# Patient Record
Sex: FEMALE | Race: WHITE | NOT HISPANIC OR LATINO | Employment: OTHER | URBAN - METROPOLITAN AREA
[De-identification: names, ages, dates, MRNs, and addresses within clinical notes are randomized per-mention and may not be internally consistent; named-entity substitution may affect disease eponyms.]

---

## 2019-05-02 LAB
CREAT SERPL-MCNC: 0.8 MG/DL
GFR SERPL CREATININE-BSD FRML MDRD: >60 ML/MIN
HBA1C MFR BLD: 6.6 % (ref 0–5.7)
POTASSIUM SERPL-SCNC: 3.7 MEQ/L (ref 3.5–5.3)

## 2019-07-17 ENCOUNTER — TRANSFERRED RECORDS (OUTPATIENT)
Dept: HEALTH INFORMATION MANAGEMENT | Facility: CLINIC | Age: 64
End: 2019-07-17

## 2019-07-19 ENCOUNTER — TRANSFERRED RECORDS (OUTPATIENT)
Dept: HEALTH INFORMATION MANAGEMENT | Facility: CLINIC | Age: 64
End: 2019-07-19

## 2019-07-19 LAB
RETINOPATHY: NORMAL
RETINOPATHY: NORMAL

## 2019-08-05 LAB — HEMOCCULT STL QL IA: NEGATIVE

## 2019-10-25 LAB
ALT SERPL-CCNC: 20 U/L (ref 0–41)
CHOLEST SERPL-MCNC: 120 MG/DL
HBA1C MFR BLD: 6.4 % (ref 0–5.7)
HDLC SERPL-MCNC: 50 MG/DL
LDLC SERPL CALC-MCNC: 56 MG/DL
TRIGL SERPL-MCNC: 72 MG/DL

## 2020-06-18 LAB
CREAT SERPL-MCNC: 0.7 MG/DL
GFR SERPL CREATININE-BSD FRML MDRD: >60 ML/MIN
HBA1C MFR BLD: 6.4 % (ref 0–5.7)
POTASSIUM SERPL-SCNC: 4 MEQ/L (ref 3.5–5.3)

## 2020-09-12 ENCOUNTER — TRANSFERRED RECORDS (OUTPATIENT)
Dept: HEALTH INFORMATION MANAGEMENT | Facility: CLINIC | Age: 65
End: 2020-09-12

## 2020-09-17 ENCOUNTER — TRANSFERRED RECORDS (OUTPATIENT)
Dept: HEALTH INFORMATION MANAGEMENT | Facility: CLINIC | Age: 65
End: 2020-09-17

## 2020-09-17 LAB — HEMOCCULT STL QL IA: NEGATIVE

## 2020-10-16 ENCOUNTER — OFFICE VISIT (OUTPATIENT)
Dept: FAMILY MEDICINE | Facility: CLINIC | Age: 65
End: 2020-10-16
Payer: COMMERCIAL

## 2020-10-16 VITALS
OXYGEN SATURATION: 100 % | HEIGHT: 64 IN | WEIGHT: 202 LBS | BODY MASS INDEX: 34.49 KG/M2 | HEART RATE: 76 BPM | TEMPERATURE: 97.5 F | SYSTOLIC BLOOD PRESSURE: 138 MMHG | DIASTOLIC BLOOD PRESSURE: 69 MMHG

## 2020-10-16 DIAGNOSIS — H00.14 CHALAZION LEFT UPPER EYELID: ICD-10-CM

## 2020-10-16 DIAGNOSIS — L60.8 NAIL DEFORMITY: ICD-10-CM

## 2020-10-16 DIAGNOSIS — I10 HYPERTENSION GOAL BP (BLOOD PRESSURE) < 130/80: ICD-10-CM

## 2020-10-16 DIAGNOSIS — E11.9 TYPE 2 DIABETES MELLITUS WITHOUT COMPLICATION, WITHOUT LONG-TERM CURRENT USE OF INSULIN (H): ICD-10-CM

## 2020-10-16 DIAGNOSIS — Z76.89 ENCOUNTER TO ESTABLISH CARE: Primary | ICD-10-CM

## 2020-10-16 DIAGNOSIS — E78.5 HYPERLIPIDEMIA LDL GOAL <100: ICD-10-CM

## 2020-10-16 DIAGNOSIS — Z23 NEED FOR PROPHYLACTIC VACCINATION AND INOCULATION AGAINST INFLUENZA: ICD-10-CM

## 2020-10-16 PROCEDURE — 90662 IIV NO PRSV INCREASED AG IM: CPT | Performed by: NURSE PRACTITIONER

## 2020-10-16 PROCEDURE — 99203 OFFICE O/P NEW LOW 30 MIN: CPT | Performed by: NURSE PRACTITIONER

## 2020-10-16 PROCEDURE — G0008 ADMIN INFLUENZA VIRUS VAC: HCPCS | Performed by: NURSE PRACTITIONER

## 2020-10-16 RX ORDER — ATORVASTATIN CALCIUM 40 MG/1
20 TABLET, FILM COATED ORAL DAILY
COMMUNITY
End: 2020-10-16

## 2020-10-16 RX ORDER — CHOLECALCIFEROL (VITAMIN D3) 50 MCG
1 TABLET ORAL DAILY
COMMUNITY

## 2020-10-16 RX ORDER — LISINOPRIL 20 MG/1
40 TABLET ORAL DAILY
COMMUNITY
End: 2020-10-16 | Stop reason: DRUGHIGH

## 2020-10-16 RX ORDER — METFORMIN HCL 500 MG
2000 TABLET, EXTENDED RELEASE 24 HR ORAL
Qty: 360 TABLET | Refills: 0 | Status: SHIPPED | OUTPATIENT
Start: 2020-10-16 | End: 2020-12-21

## 2020-10-16 RX ORDER — ATORVASTATIN CALCIUM 40 MG/1
40 TABLET, FILM COATED ORAL DAILY
Qty: 90 TABLET | Refills: 0 | Status: SHIPPED | OUTPATIENT
Start: 2020-10-16 | End: 2020-12-21

## 2020-10-16 RX ORDER — LISINOPRIL 20 MG/1
40 TABLET ORAL DAILY
Status: CANCELLED | OUTPATIENT
Start: 2020-10-16

## 2020-10-16 RX ORDER — MULTIVIT WITH MINERALS/LUTEIN
1000 TABLET ORAL DAILY
COMMUNITY

## 2020-10-16 RX ORDER — ATORVASTATIN CALCIUM 40 MG/1
40 TABLET, FILM COATED ORAL DAILY
Qty: 90 TABLET | Refills: 0 | Status: SHIPPED | OUTPATIENT
Start: 2020-10-16 | End: 2020-10-16

## 2020-10-16 RX ORDER — LISINOPRIL AND HYDROCHLOROTHIAZIDE 12.5; 2 MG/1; MG/1
2 TABLET ORAL DAILY
Qty: 180 TABLET | Refills: 0 | Status: SHIPPED | OUTPATIENT
Start: 2020-10-16 | End: 2020-12-21

## 2020-10-16 RX ORDER — METFORMIN HCL 500 MG
500 TABLET, EXTENDED RELEASE 24 HR ORAL
COMMUNITY
End: 2020-10-16

## 2020-10-16 SDOH — HEALTH STABILITY: MENTAL HEALTH: HOW OFTEN DO YOU HAVE 6 OR MORE DRINKS ON ONE OCCASION?: NOT ASKED

## 2020-10-16 SDOH — HEALTH STABILITY: MENTAL HEALTH: HOW MANY STANDARD DRINKS CONTAINING ALCOHOL DO YOU HAVE ON A TYPICAL DAY?: NOT ASKED

## 2020-10-16 SDOH — HEALTH STABILITY: MENTAL HEALTH: HOW OFTEN DO YOU HAVE A DRINK CONTAINING ALCOHOL?: NOT ASKED

## 2020-10-16 ASSESSMENT — MIFFLIN-ST. JEOR: SCORE: 1446.27

## 2020-10-16 NOTE — PROGRESS NOTES
Subjective   Janelle Bhat is a 64 year old female who presents to clinic today for the following health issues:    HPI   Diabetes Follow-up    How often are you checking your blood sugar? One time daily  What time of day are you checking your blood sugars (select all that apply)?  different times   Have you had any blood sugars above 200?  No  Have you had any blood sugars below 70?  No    What symptoms do you notice when your blood sugar is low?  None - last A1c - 6.4 earlier this year    What concerns do you have today about your diabetes? None     Do you have any of these symptoms? (Select all that apply)  No numbness or tingling in feet.  No redness, sores or blisters on feet.  No complaints of excessive thirst.  No reports of blurry vision.  No significant changes to weight.  Last eye exam - just over 1 year ago    New here from [place].  Meds reviewed with patient from her prescription bottles. She is unsure but believes she has not had physical exam or Mammo this year. Last labs were good per her report.   Chronic conditions of HTN, Hyperlipidemia and Type 2 diabetes all stable on her medications without concern or medication side effects.   Due for eye exam; she does not think last eye exam checked for retinopathy.   Concerns of a stye on upper left eyelid that is now hard and painless. Also has hard thick toe nails.       BP Readings from Last 2 Encounters:   10/16/20 138/69     No results found for: A1C, LDL    Hyperlipidemia Follow-Up      Are you regularly taking any medication or supplement to lower your cholesterol?   Yes- Atorvastatin    Are you having muscle aches or other side effects that you think could be caused by your cholesterol lowering medication?  No    Hypertension Follow-up      Do you check your blood pressure regularly outside of the clinic? No     Are you following a low salt diet? Yes    Are your blood pressures ever more than 140 on the top number (systolic) OR more   than  "90 on the bottom number (diastolic), for example 140/90? Unsure      How many servings of fruits and vegetables do you eat daily?  3 at least    On average, how many sweetened beverages do you drink each day (Examples: soda, juice, sweet tea, etc.  Do NOT count diet or artificially sweetened beverages)?   0    How many days per week do you exercise enough to make your heart beat faster? 0 - does walk - unsure if heart rate is fasting    How many minutes a day do you exercise enough to make your heart beat faster? 0    How many days per week do you miss taking your medication? 0      Reviewed and updated as needed this visit by provider:  Tobacco  Allergies  Meds  Problems  Med Hx  Surg Hx  Fam Hx          Review of Systems   Constitutional, HEENT, cardiovascular, pulmonary, GI, , musculoskeletal, neuro, skin, endocrine and psych systems are negative, except as otherwise noted in the HPI.          Objective   /69 (BP Location: Right arm, Patient Position: Chair, Cuff Size: Adult Large)   Pulse 76   Temp 97.5  F (36.4  C) (Tympanic)   Ht 1.626 m (5' 4\")   Wt 91.6 kg (202 lb)   SpO2 100%   Breastfeeding No   BMI 34.67 kg/m   Body mass index is 34.67 kg/m .  Physical Exam   GENERAL: healthy, alert, well nourished, well hydrated, no distress  EYES: Eyes grossly normal to inspection, extraocular movements - intact, and PERRL; left upper inner eyelid with chalazion painless  RESP: lungs clear to auscultation - no rales, no rhonchi, no wheezes  CV: regular rates and rhythm, normal S1 S2, no S3 or S4 and no murmur, no click or rub -  SKIN: no suspicious lesions, no rashes  Nail exam: onychomycosis of the toenails; very thick, hard yellowed toenails; left great toe nail growing sideways        Assessment & Plan   Janelle Dickinson was seen today for new patient, establish care and recheck medication.    Diagnoses and all orders for this visit:    Encounter to establish care  CIRILO or records from [place].   Need " to return for wellness exam within the next few months.     Hyperlipidemia LDL goal <100  No concerns.  Stable.  Continue same medication this was refilled today.  Labs due she will return for fasting physical with labs.   -     atorvastatin (LIPITOR) 40 MG tablet; Take 1 tablet (40 mg) by mouth daily    Hypertension goal BP (blood pressure) < 130/80  No concerns.  Stable.  Continue same medication this was refilled today.  Labs due she will return for fasting physical with labs.   -     lisinopril-hydrochlorothiazide (ZESTORETIC) 20-12.5 MG tablet; Take 2 tablets by mouth daily    Type 2 diabetes mellitus without complication, without long-term current use of insulin (H)  No concerns.  Stable.  Continue same medication this was refilled today.  Labs due she will return for fasting physical with labs.   -     metFORMIN (GLUCOPHAGE-XR) 500 MG 24 hr tablet; Take 4 tablets (2,000 mg) by mouth daily (with dinner) 4 tablets daily with dinner  -     EYE ADULT REFERRAL; Future  -     Orthopedic & Spine  Referral; Future    Chalazion left upper eyelid  -     EYE ADULT REFERRAL; Future    Nail deformity  -     Orthopedic & Spine  Referral; Future    See Patient Instructions    Return in about 4 weeks (around 11/13/2020) for Wellness exam fasting labs.     Georgina Caputo, ROSS-[address] [address]  [email or web address]   Office: [phone]

## 2020-10-22 ENCOUNTER — OFFICE VISIT (OUTPATIENT)
Dept: OPTOMETRY | Facility: CLINIC | Age: 65
End: 2020-10-22
Attending: NURSE PRACTITIONER
Payer: COMMERCIAL

## 2020-10-22 DIAGNOSIS — H01.003 BLEPHARITIS OF BOTH EYES, UNSPECIFIED EYELID, UNSPECIFIED TYPE: Primary | ICD-10-CM

## 2020-10-22 DIAGNOSIS — H00.014 HORDEOLUM EXTERNUM OF LEFT UPPER EYELID: ICD-10-CM

## 2020-10-22 DIAGNOSIS — H01.006 BLEPHARITIS OF BOTH EYES, UNSPECIFIED EYELID, UNSPECIFIED TYPE: Primary | ICD-10-CM

## 2020-10-22 DIAGNOSIS — E11.9 TYPE 2 DIABETES MELLITUS WITHOUT COMPLICATION, WITHOUT LONG-TERM CURRENT USE OF INSULIN (H): ICD-10-CM

## 2020-10-22 PROCEDURE — 99203 OFFICE O/P NEW LOW 30 MIN: CPT | Performed by: OPTOMETRIST

## 2020-10-22 RX ORDER — ERYTHROMYCIN 5 MG/G
0.5 OINTMENT OPHTHALMIC 2 TIMES DAILY
Qty: 1 TUBE | Refills: 1 | Status: SHIPPED | OUTPATIENT
Start: 2020-10-22 | End: 2020-10-29

## 2020-10-22 RX ORDER — DOXYCYCLINE HYCLATE 100 MG
100 TABLET ORAL 2 TIMES DAILY
Qty: 20 TABLET | Refills: 0 | Status: CANCELLED | OUTPATIENT
Start: 2020-10-22 | End: 2020-11-01

## 2020-10-22 ASSESSMENT — VISUAL ACUITY
OS_CC: 20/20
METHOD: SNELLEN - LINEAR
CORRECTION_TYPE: GLASSES

## 2020-10-22 ASSESSMENT — EXTERNAL EXAM - RIGHT EYE: OD_EXAM: NORMAL

## 2020-10-22 NOTE — PATIENT INSTRUCTIONS
"Recorded as Task  Date: 01/05/2018 07:44 AM, Created By: Trip Knight  Task Name: 3. Referral Request  Assigned To: [facility] Nurse Team  Regarding Patient: Jerome Carballo, Status: Active  CommentTrip Knight - 05 Jan 2018 7:44 AM    Referral Request  Referral Request: sleep apnea test    IS THIS A NEW REQUEST?: yes      REFERRAL ORDERED BY: wife           INSURANCE TYPE: PPO BLUE CROSS      REASON FOR REFERRAL: referred by Dr Nikole Brown    Preferred Delivery Method:           Iva Jaya  PICKUP:  [phone]  Georgette Donate:  (NO or YES- confirm address correct  in IDX) no      FAX NUMBER (if applicable):  no     CALLER'S RELATIONSHIP TO PATIENT:    DARIEL OSBORNE         IF OTHER, NAME AND RELATIONSHIP:    wife    BEST NUMBER TO BE CONTACTED:  [phone]        ALTERNATIVE PHONE NUMBER:  [phone] patient's cell    Turnaround time given to caller:    ""THIS MESSAGE WILL BE SENT TO  ___ (state provider's first and last name) ____, THE CLINICAL TEAM WILL RETURN YOUR CALL AS SOON AS THEY HAVE REVIEWED YOUR MESSAGE\"". TYPICALLY IT TAKES 3 BUSINESS DAYS TO PROCESS REFERRAL REQUESTS. \""      READ BACK MESSAGE TO PATIENT  Angy Ayala (JONNIEMALAIKA)Chhaya - 05 Jan 1545 11:10 AM    TASK EDITED  Pls task pt states DR kan told them to ask you about sleep apnea testing - prior to surgery because of sleep apnea - ok to  [facility] - 05 Jan 2018 11:14 AM    TASK REASSIGNED: Previously Assigned To [facility]  ok to refer to Dr Leanne Pacheco for a sleep apnea evalution, order in [place]      Electronically signed Saul Goldsmith M.D.   Jan 5 2018 11:14AM CST    " Warm compresses four times daily for 10 minutes until resolved      Add topical ointment two times daily for a week      Cause is from underlying blepharitis  Blepharitis is a chronic or long term inflammation of the eyelids and eyelashes. It affects all ages. Causes include poor eyelid hygiene, excess oil production, staph bacteria or an allergic reaction.    Blepharitis may appear as greasy flakes on the base of the eyelashes, crusting of eyelashes and mild redness of the eyelid margins.  Sometimes it may result in an acute infection of a gland in the eyelid called a stye and sometimes painless firm nodules can form in the eyelid that do not resolve on their own and must be surgically removed.    Treatment includes warm compresses and lid hygiene with an antimicrobial lid scrub and sometimes a prescription ointment is needed.      Hot compresses/ warm soaks  Warm compresses are very beneficial to the normal functioning of the eye.   They help loosen up the eyelid debris that has collected on the eyelash follicles.  Overabundance of bacterial microorganisms along the eyelashes and lid margins induce stress on the tear film and promote inflammation.  Regular lid hygiene helps diminish the bacterial population to prevent inflammation and infection.  Cleanse lids once daily with a lid cleansing product as directed such as Ocusoft or Sterilid which can be purchased at most pharmacies. Eyelid cleansers maintain clean and healthy eyelid margins. Ocusoft or sterilid are commercial products that are available as individual wrapped cleansing pads.  Diluted baby shampoo will work, but not as well and is a cheaper alternative.    Directions for warm soaks  There are few methods for hot compresses. Moisten a washcloth with hot water, or microwave for 10 seconds, being careful to not get the cloth too hot.   Then put the washcloth onto your eyelids for 5 minutes. It will cool quickly so a rice pack or eyemask that can be heated  and laid on top of the washcloth will help retain the heat.      if there is more swelling / worsens call or return to clinic

## 2020-10-22 NOTE — LETTER
"    10/22/2020         RE: Janelle Bhat  [address] [address]        Dear Colleague,    Thank you for referring your patient, Janelle Bhat, to the [facility]. Please see a copy of my visit note below.    Chief Complaint   Patient presents with     Eye Problem Left Eye     HPI    Eye Problem Left Eye      In left eye. Onset was gradual. This started 6 weeks ago. Severity is moderate. Occurring constantly. It is worse throughout the day. Since onset it is gradually improving. Associated symptoms include eye pain and swelling.   Comments      09/2020: She was referred to optometry because of a swelling on her left upper lid. \"like a pimple\". Was told by PCP that is was going to take some time. Had antibiotic drops- was on for 10 days. She has had no problems afterward. She reports now there is a developing hard lump with a white core that hurts to the touch. She was diagnosed with a chalazion- has been doing phone visits with her doctor in [place].     Janelle Dickinson does not want to do her full exam today, she may have done this already this year in [place], just moved here and has to check her medical records and then will schedule   Do you wear contact lenses? No        Laya Aiken CPO    See Review Of Systems       Medical, surgical and family histories reviewed and updated 10/22/2020.         OBJECTIVE: See Ophthalmology exam    ASSESSMENT:    ICD-10-CM    1. Type 2 diabetes mellitus without complication, without long-term current use of insulin (H)  E11.9 EYE ADULT REFERRAL   2. Hordeolum externum of left upper eyelid  H00.014 EYE ADULT REFERRAL     erythromycin (ROMYCIN) 5 MG/GM ophthalmic ointment    No I&C required as it is draining  no oral antibiotic indicated at this time    PLAN:  Discussed lid hygiene / proper warm compresses   Continue warm compresses qid add ointment two times daily for one week if worsens call       Tonie Matthews OD       Again, thank you " for allowing me to participate in the care of your patient.        Sincerely,        Tonie Matthews OD

## 2020-10-22 NOTE — PROGRESS NOTES
"Chief Complaint   Patient presents with     Eye Problem Left Eye     HPI    Eye Problem Left Eye      In left eye. Onset was gradual. This started 6 weeks ago. Severity is moderate. Occurring constantly. It is worse throughout the day. Since onset it is gradually improving. Associated symptoms include eye pain and swelling.   Comments      09/2020: She was referred to optometry because of a swelling on her left upper lid. \"like a pimple\". Was told by PCP that is was going to take some time. Had antibiotic drops- was on for 10 days. She has had no problems afterward. She reports now there is a developing hard lump with a white core that hurts to the touch. She was diagnosed with a chalazion- has been doing phone visits with her doctor in [place].     Janelle Dickinson does not want to do her full exam today, she may have done this already this year in [place], just moved here and has to check her medical records and then will schedule   Do you wear contact lenses? No        Laya Aiken CPO    See Review Of Systems       Medical, surgical and family histories reviewed and updated 10/22/2020.         OBJECTIVE: See Ophthalmology exam    ASSESSMENT:    ICD-10-CM    1. Type 2 diabetes mellitus without complication, without long-term current use of insulin (H)  E11.9 EYE ADULT REFERRAL   2. Hordeolum externum of left upper eyelid  H00.014 EYE ADULT REFERRAL     erythromycin (ROMYCIN) 5 MG/GM ophthalmic ointment    No I&C required as it is draining  no oral antibiotic indicated at this time    PLAN:  Discussed lid hygiene / proper warm compresses   Continue warm compresses qid add ointment two times daily for one week if worsens call       Tonie Matthews OD   "

## 2020-11-02 ENCOUNTER — OFFICE VISIT (OUTPATIENT)
Dept: PODIATRY | Facility: CLINIC | Age: 65
End: 2020-11-02
Payer: COMMERCIAL

## 2020-11-02 VITALS
WEIGHT: 203 LBS | SYSTOLIC BLOOD PRESSURE: 154 MMHG | DIASTOLIC BLOOD PRESSURE: 80 MMHG | HEIGHT: 64 IN | BODY MASS INDEX: 34.66 KG/M2

## 2020-11-02 DIAGNOSIS — L60.8 NAIL DEFORMITY: ICD-10-CM

## 2020-11-02 DIAGNOSIS — E11.9 TYPE 2 DIABETES MELLITUS WITHOUT COMPLICATION, WITHOUT LONG-TERM CURRENT USE OF INSULIN (H): ICD-10-CM

## 2020-11-02 DIAGNOSIS — L60.8 CHANGE IN NAIL APPEARANCE: Primary | ICD-10-CM

## 2020-11-02 PROCEDURE — 99203 OFFICE O/P NEW LOW 30 MIN: CPT | Performed by: PODIATRIST

## 2020-11-02 ASSESSMENT — MIFFLIN-ST. JEOR: SCORE: 1445.8

## 2020-11-02 NOTE — PROGRESS NOTES
ASSESSMENT/PLAN:  Encounter Diagnoses   Name Primary?     Type 2 diabetes mellitus without complication, without long-term current use of insulin (H)      Nail deformity      Change in nail appearance Yes     I explained that toenail changes are often from injury to a nail unit, rather than from fungus.  Injury might be a one-time event or from repetitive irritation in foot wear and with certain types of activities.  Injured nails are likely more susceptible to fungal infection.  Change seen in multiple nails is more likely from fungus.  Treatment options are limited.     It was explained that many people opt to simply keep the involved nails trimmed and filed. [facility] does not offer this service.  Another option is a trial of a topical and/or oral antifungal.  Many times these are not successful nor provide a cure.  These medications do not correct a nail deformity.      Permanent removal of deformed toenails is an option.      The patient opted live with the nails the way they are, file them down and possibly seek out a foot care nurse business to assist.     Otherwise healthy feet.    Bryson Ray, CHANNING, FACFAS, MS    [facility]      ____________________________________________________________________    HPI:       I was asked by Georgina Caputo, YESSI FINN to evaluate this patient, in consultation, regarding type 2 DM and a nail deformity of her left great toe.     Chief Complaint: two toenails with issue, left foot (hallux and 3rd toe)  Onset of problem: before 2013  She is concerned about nail fungus and inquires about treatment options.  No pain.  Type 2 DM.  Denies numbness in her feet    *There is no problem list on file for this patient.  *  *No past surgical history on file.*  *  Current Outpatient Medications   Medication Sig Dispense Refill     atorvastatin (LIPITOR) 40 MG tablet Take 1 tablet (40 mg) by mouth daily 90 tablet 0      lisinopril-hydrochlorothiazide (ZESTORETIC) 20-12.5 MG tablet Take 2 tablets by mouth daily 180 tablet 0     metFORMIN (GLUCOPHAGE-XR) 500 MG 24 hr tablet Take 4 tablets (2,000 mg) by mouth daily (with dinner) 4 tablets daily with dinner 360 tablet 0     vitamin C (ASCORBIC ACID) 1000 MG TABS Take 1,000 mg by mouth daily       vitamin D3 (CHOLECALCIFEROL) 50 mcg (2000 units) tablet Take 1 tablet by mouth daily         ROS:     A 10-point review of systems was performed and is positive for that noted above in the HPI and as seen below.  All other areas are negative.     Numbness in feet?  no   Calf pain with walking? rarely  Recent foot/ankle injury? no  Weight change over past 12 months? 18# loss  Self perception as overweight? yes  Recent flu-like symptoms? no  Joint pain other than feet ? Knees, hip, back    Social History: Employment:  retired;  Exercise/Physical activity:  Walking 2-3x/ week;  Tobacco use:  no  Social History     Socioeconomic History     Marital status: Single     Spouse name: Not on file     Number of children: Not on file     Years of education: Not on file     Highest education level: Not on file   Occupational History     Not on file   Social Needs     Financial resource strain: Not on file     Food insecurity     Worry: Not on file     Inability: Not on file     Transportation needs     Medical: Not on file     Non-medical: Not on file   Tobacco Use     Smoking status: Never Smoker     Smokeless tobacco: Never Used   Substance and Sexual Activity     Alcohol use: Yes     Comment: 0-2 Q1M     Drug use: Never     Sexual activity: Not Currently     Partners: Male   Lifestyle     Physical activity     Days per week: Not on file     Minutes per session: Not on file     Stress: Not on file   Relationships     Social connections     Talks on phone: Not on file     Gets together: Not on file     Attends Hindu service: Not on file     Active member of club or organization: Not on file      "Attends meetings of clubs or organizations: Not on file     Relationship status: Not on file     Intimate partner violence     Fear of current or ex partner: Not on file     Emotionally abused: Not on file     Physically abused: Not on file     Forced sexual activity: Not on file   Other Topics Concern     Not on file   Social History Narrative     Not on file       Family history:  Family History   Problem Relation Age of Onset     Diabetes Mother      Macular Degeneration Mother      Hypertension Sister        Rheumatoid arthritis:  no  Foot Problems: sibling with plantar faciitis  Diabetes: parent      EXAM:    Vitals: BP (!) 158/90   Ht 1.626 m (5' 4\")   Wt 92.1 kg (203 lb)   BMI 34.84 kg/m    BMI: Body mass index is 34.84 kg/m .  Height: 5' 4\"    Constitutional/ general:  Pt is in no apparent distress, appears well-nourished.  Cooperative with history and physical exam.     Diabetic foot exam: normal DP and PT pulses, no trophic changes or ulcerative lesions and normal sensory exam    Vascular:  Pedal pulses are palpable bilaterally for both the DP and PT arteries.  CFT < 3 sec.  No edema.  Pedal hair growth noted.     Neuro:  Alert and oriented x 3. Coordinated gait.  Light touch sensation is intact to the L4, L5, S1 distributions. No obvious deficits.  No evidence of neurological-based weakness, spasticity, or contracture in the lower extremities.     Derm: Normal texture and turgor.  No erythema, ecchymosis, or cyanosis.  No open lesions.     The left hallux and 3rd toenails are thickened and discolored. The hallux nail grows slightly lateral, without injury to skin on the 2nd toe.    Musculoskeletal:    Lower extremity muscle strength is normal.  Patient is ambulatory without an assistive device or brace .  No gross deformities.      "

## 2020-11-02 NOTE — LETTER
11/2/2020         RE: Janelle Bhat  [address] [facility] [address]        Dear Colleague,    Thank you for referring your patient, Janelle Bhat, to the [facility]. Please see a copy of my visit note below.      ASSESSMENT/PLAN:  Encounter Diagnoses   Name Primary?     Type 2 diabetes mellitus without complication, without long-term current use of insulin (H)      Nail deformity      Change in nail appearance Yes     I explained that toenail changes are often from injury to a nail unit, rather than from fungus.  Injury might be a one-time event or from repetitive irritation in foot wear and with certain types of activities.  Injured nails are likely more susceptible to fungal infection.  Change seen in multiple nails is more likely from fungus.  Treatment options are limited.     It was explained that many people opt to simply keep the involved nails trimmed and filed. [facility] does not offer this service.  Another option is a trial of a topical and/or oral antifungal.  Many times these are not successful nor provide a cure.  These medications do not correct a nail deformity.      Permanent removal of deformed toenails is an option.      The patient opted live with the nails the way they are, file them down and possibly seek out a foot care nurse business to assist.     Otherwise healthy feet.    Bryson Ray DPM, FACFAS, MS    [facility]      ____________________________________________________________________    HPI:       I was asked by Georgina Caputo, APRN CNP to evaluate this patient, in consultation, regarding type 2 DM and a nail deformity of her left great toe.     Chief Complaint: two toenails with issue, left foot (hallux and 3rd toe)  Onset of problem: before 2013  She is concerned about nail fungus and inquires about treatment options.  No pain.  Type 2 DM.  Denies numbness in her  feet    *There is no problem list on file for this patient.  *  *No past surgical history on file.*  *  Current Outpatient Medications   Medication Sig Dispense Refill     atorvastatin (LIPITOR) 40 MG tablet Take 1 tablet (40 mg) by mouth daily 90 tablet 0     lisinopril-hydrochlorothiazide (ZESTORETIC) 20-12.5 MG tablet Take 2 tablets by mouth daily 180 tablet 0     metFORMIN (GLUCOPHAGE-XR) 500 MG 24 hr tablet Take 4 tablets (2,000 mg) by mouth daily (with dinner) 4 tablets daily with dinner 360 tablet 0     vitamin C (ASCORBIC ACID) 1000 MG TABS Take 1,000 mg by mouth daily       vitamin D3 (CHOLECALCIFEROL) 50 mcg (2000 units) tablet Take 1 tablet by mouth daily         ROS:     A 10-point review of systems was performed and is positive for that noted above in the HPI and as seen below.  All other areas are negative.     Numbness in feet?  no   Calf pain with walking? rarely  Recent foot/ankle injury? no  Weight change over past 12 months? 18# loss  Self perception as overweight? yes  Recent flu-like symptoms? no  Joint pain other than feet ? Knees, hip, back    Social History: Employment:  retired;  Exercise/Physical activity:  Walking 2-3x/ week;  Tobacco use:  no  Social History     Socioeconomic History     Marital status: Single     Spouse name: Not on file     Number of children: Not on file     Years of education: Not on file     Highest education level: Not on file   Occupational History     Not on file   Social Needs     Financial resource strain: Not on file     Food insecurity     Worry: Not on file     Inability: Not on file     Transportation needs     Medical: Not on file     Non-medical: Not on file   Tobacco Use     Smoking status: Never Smoker     Smokeless tobacco: Never Used   Substance and Sexual Activity     Alcohol use: Yes     Comment: 0-2 Q1M     Drug use: Never     Sexual activity: Not Currently     Partners: Male   Lifestyle     Physical activity     Days per week: Not on file      "Minutes per session: Not on file     Stress: Not on file   Relationships     Social connections     Talks on phone: Not on file     Gets together: Not on file     Attends Sabianism service: Not on file     Active member of club or organization: Not on file     Attends meetings of clubs or organizations: Not on file     Relationship status: Not on file     Intimate partner violence     Fear of current or ex partner: Not on file     Emotionally abused: Not on file     Physically abused: Not on file     Forced sexual activity: Not on file   Other Topics Concern     Not on file   Social History Narrative     Not on file       Family history:  Family History   Problem Relation Age of Onset     Diabetes Mother      Macular Degeneration Mother      Hypertension Sister        Rheumatoid arthritis:  no  Foot Problems: sibling with plantar faciitis  Diabetes: parent      EXAM:    Vitals: BP (!) 158/90   Ht 1.626 m (5' 4\")   Wt 92.1 kg (203 lb)   BMI 34.84 kg/m    BMI: Body mass index is 34.84 kg/m .  Height: 5' 4\"    Constitutional/ general:  Pt is in no apparent distress, appears well-nourished.  Cooperative with history and physical exam.     Diabetic foot exam: normal DP and PT pulses, no trophic changes or ulcerative lesions and normal sensory exam    Vascular:  Pedal pulses are palpable bilaterally for both the DP and PT arteries.  CFT < 3 sec.  No edema.  Pedal hair growth noted.     Neuro:  Alert and oriented x 3. Coordinated gait.  Light touch sensation is intact to the L4, L5, S1 distributions. No obvious deficits.  No evidence of neurological-based weakness, spasticity, or contracture in the lower extremities.     Derm: Normal texture and turgor.  No erythema, ecchymosis, or cyanosis.  No open lesions.     The left hallux and 3rd toenails are thickened and discolored. The hallux nail grows slightly lateral, without injury to skin on the 2nd toe.    Musculoskeletal:    Lower extremity muscle strength is normal.  " Patient is ambulatory without an assistive device or brace .  No gross deformities.          Again, thank you for allowing me to participate in the care of your patient.        Sincerely,        Bryson Ray DPM

## 2020-11-02 NOTE — PATIENT INSTRUCTIONS
Thank you for choosing [facility]!    DR. MELÉNDEZ[facility]S CLINIC LOCATIONS     [place] SCHEDULE SURGERY: [phone]   [address] APPOINTMENTS: [phone]   [place] [address] BILLING QUESTIONS: [phone]  [phone] RADIOLOGY: [phone]       [facility]    [address]    [place] [address] [phone]  [phone]      Follow up: as needed    Please read through the following handouts and if you have any questions, please feel free to call us or send a [employer] message!    ROUTINE FOOT CARE (NAIL TRIMMING / CALLUSES)    Go to [email or web address].org ([employer]es Association) and search for providers near you.  Otherwise, this is a list we have gathered of  recommended locations/providers in [place].    [facility]   [phone]   [facility]  [phone]  [email or web address]   [facility]  [phone]  [facility] 15 mile radius [facility]  [phone]  [facility].   Raina Jacob, DPM  [address] [place] [address] [phone] Balaji Conley, DPM  [address] [facility], [place] [address] [phone]   [facility]  [phone] [address] [place] [address]  [facility]  Dr. Carlos Carpenter  [phone]  [place], [place]   [facility]  [phone]  [place] & [place] Locations  (does not take BCBS) FYI:  *Some providers accept insurance while others are out of pocket. Please contact them for details*         NAIL FUNGUS / ONYCHOMYCOSIS   Nail fungus is not a hygiene problem and will likely not lead to significant medical problems. The nails may get thick causing pain and possibly local skin infection. Treatments include debridement (trimming), oral antifungals, topical antifungals and complete removal of the nail. Most fungal nails are not treated.     Topicals such as tea tree oil can be helpful for surface fungus and may, at best, limit progression. Over  the counter creams (such as Lamisil) can also be used however, their effectiveness is also quite low.  Topical treatment with Pen lac is expensive and often not covered by insurance. Pen lac has an approximate 8% success rate. Topical therapy recommendations is to apply twice a day for at least 3-4 months as it takes 9 months for new nail to grow out.     Experts suggest soaking your feet for 15 to 20 minutes in a mixture of 1 cup vinegar to 4 cups warm water. Be sure to rinse well and pat your feet dry when you're done. You can soak your feet like this daily. But if your skin becomes irritated, try soaking only two to three times a week. Vicks VapoRub, as with vinegar, there have been no controlled clinical trials to assess the effectiveness of Vicks VapoRub on nail fungus, but there have been numerous anecdotal reports that it works. There's no consensus on how often to apply this product, so check with your doctor before using it on your nails.      Oral therapies include Sporanox and Lamisil. Oral therapies are also expensive and not very effective. Side effects such as liver disease are the main concern. Return of fungus is common even if the treatment worked.      Other Tips:  - Penlac nail medication apply daily x 4 months; remove old polish first day of each week  - Antifungal cream/powder (Zeasorb) - apply daily to feet and shoes x 2 months  - Clean shoes with Lysol or in washing machine every few weeks  - Rotate shoe gear; give them 24 hours to dry out between days wearing them  - Clean pair of socks in morning, clean pair in afternoon if your feet sweat  - Shower shoes used in public showers/pools        DIABETES AND YOUR FEET  Diabetes can result in several problems in the feet including ulcers (open sores) and amputations. Two of the most important reasons why people develop foot problems when they have diabetes is : 1. Neuropathy (loss of feeling)  2. Vascular disease (loss or decrease of blood  "flow).    Neuropathy is a term used to describe a loss of nerve function.  Patients with diabetes are at risk of developing neuropathy if their sugars continue to run high and are above the normal value. One theory for neuropathy is that the \"extra\" sugar in the body enters the nerves and is broken down. These by-products build up in the nerve causing it to swell and impairing nerve function. Often times, this can be prevented by controlling your sugars, dieting and exercise.    When a person develops neuropathy, they usually begin to feel numbness or tingling in their feet and sometime in their legs.  Other symptoms may include painful burning or hot feet, tingling or feeling like insects or ants are crawling on your feet or legs.  If the diabetes is sever and the sugars run high for long periods of time, neuropathy can also occur in the hands.    Vascular disease  is a term used to describe a loss or decrease in circulation (blood flow). There is a problem in getting blood and oxygen to areas that need it. Similar to neuropathy, sugars can build up in the walls of the arteries (blood vessels) and cause them to become swollen, thickened and hardened. This decreases the amount of blood that can go to an area that needs it. Though this is common in the legs of diabetic patients, it can also affect other arteries (blood vessels) in the body such as in the heart and eyes.    In the legs, vascular disease usually results in cramping. Patients who develop leg cramps after walking the same distance every time (i.e. One block, half a mile, ect.) need to let their doctors know so that their circulation may be checked. Cramps causing severe pain in the feet and/or legs while sleeping and the cramps go away when you stand or hang your legs off the side of the bed, may also be a sign of poor blood circulation.  Occasional cramping in cold weather or on rare occasions with activity may not be due to poor circulation, but you " should inform your doctor.    PREVENTION OF THESE DISEASES  The key to prevention is good blood sugar control. Poor blood sugar control is a big reason many of these problems start. Physical activity (exercise) is a very good way to help decrease your blood sugars. Exercise can lower your blood sugar, blood pressure, and cholesterol. It also reduces your risk for heart disease and stroke, relieves stress, and strengthens your heart, muscles and bones.  In addition, regular activity helps insulin work better, improves your blood circulation, and keeps your joints flexible. If you're trying to lose weight, a combination of exercise and wise food choices can help you reach your target weight and maintain it.      PAIN MANAGEMENT  1.Blood Sugar Control - Most important  2. Medications such as:  Amytriptylline, duloxetine, gabapentin, lyrica, tramadol  3. Nutritional therapy:  Vitamin B6 (100mg daily), Vitamin B12 (75mcg daily), Vitamin D 2000 IU daily), Alpha-Lipoic Acid (600-1800mg daily), Acetyl-L-Carnitine (500-1000mg TID, L-methyl folate (1500mcg daily)    ** Metformin can block Vitamin B6 and B12 so it is important to supplement**    FOOT CARE RECOMMENDATIONS   1. Wash your feet with lukewarm water and a mild soap and then dry them thoroughly, especially between the toes.     2. Examine your feet daily looking for cuts, corns, blisters, cracks, ect, especially after wearing new shoes. Make sure to look between your toes. If you cannot see the bottom of your feet, set a mirror on the floor and hold your foot over it, or ask a spouse, friend or family member to examine your feet for you. Contact your doctor immediately if new problems are noted or if sores are not healing.     3. Immediately apply moisturizer to the tops and bottoms of your feet, avoiding areas between the toes. Hand lotion (Intesive Care, Monique, Eucerin, Neutrogena, Curel, ect) is sufficient unless your doctor prescribes a medicated lotion. Apply  sunscreen to your feet when going swimming outside.     4. Use clean comfortable shoes, wear white socks (if you have any bleeding or drainage, you will see it on white socks). Socks should not have thick seams or cut off the circulation around the leg. Break in new shoes slowly and rotate with older shoes until broken in. Check the inside of your shoes with your hand to look for areas of irritation or objects that may have fallen into your shoes.       5. Keep slippers by the side of your bed for use during the night.     6.  Shoes should be fitted by a professional and should not cause areas of irritation.  Check your feet regularly when wearing a new pair of shoes and replace them as needed.     7.  Talk to your doctor about proper exercise. Exercise and stretching stimulate blood flow to your feet and maintain proper glucose levels.     8.  Monitor your blood glucose level as instructed by your doctor. Notify your doctor immediately if your blood sugar is abnormally high or low.    9. Cut your nails straight across, but then gently round any sharp edges with a cardboard nail file. If you have neuropathy, peripheral vascular disease or cannot see that well to trim your own toenails contact [facility] ([phone]) or [facility] ([phone]).      THINGS TO AVOID DOING   1.  Do not soak your feet if you have an open sore. Use only lukewarm water and always check the temperature with your hand as hot water can easily burn your feet.       2.  Never use a hot water bottle or heating pad on your feet. Also do not apply cold compresses to your feet. With decreased sensation, you could burn or freeze your feet.       3.  Do not apply any of these to your feet:    -  Over the counter medicine for corns or warts    -  Harsh chemicals like boric acid    -  Do not self-treat corns, cuts, blisters or infections. Always consult your doctor.       4.  Do not wear sandals, slippers or walk barefoot, especially on hot sand  or concrete or other harsh surfaces.     5.  If you smoke, stop!!!        Janelle Dickinson to follow up with Primary Care provider regarding elevated blood pressure. (if equal or greater than 140/90)

## 2020-11-29 ENCOUNTER — HOSPITAL ENCOUNTER (EMERGENCY)
Facility: CLINIC | Age: 65
Discharge: HOME | End: 2020-11-29
Attending: EMERGENCY MEDICINE | Admitting: EMERGENCY MEDICINE
Payer: COMMERCIAL

## 2020-11-29 VITALS
SYSTOLIC BLOOD PRESSURE: 179 MMHG | BODY MASS INDEX: 35 KG/M2 | RESPIRATION RATE: 20 BRPM | HEART RATE: 75 BPM | HEIGHT: 64 IN | TEMPERATURE: 97.8 F | DIASTOLIC BLOOD PRESSURE: 91 MMHG | WEIGHT: 205 LBS | OXYGEN SATURATION: 99 %

## 2020-11-29 DIAGNOSIS — R04.0 EPISTAXIS: ICD-10-CM

## 2020-11-29 PROCEDURE — 99283 EMERGENCY DEPT VISIT LOW MDM: CPT

## 2020-11-29 RX ORDER — OXYMETAZOLINE HYDROCHLORIDE 0.05 G/100ML
SPRAY NASAL
Status: DISCONTINUED
Start: 2020-11-29 | End: 2020-11-29 | Stop reason: HOSPADM

## 2020-11-29 ASSESSMENT — MIFFLIN-ST. JEOR: SCORE: 1454.87

## 2020-11-29 NOTE — ED AVS SNAPSHOT
[facility]  [address]  [facility] [address]  Phone: [phone]  Fax: [phone]                                    Janelle Bhat   MRN: [id number]    Department: [facility]   Date of Visit: 11/29/2020           After Visit Summary Signature Page    I have received my discharge instructions, and my questions have been answered. I have discussed any challenges I see with this plan with the nurse or doctor.    ..........................................................................................................................................  Patient/Patient Representative Signature      ..........................................................................................................................................  Patient Representative Print Name and Relationship to Patient    ..................................................               ................................................  Date                                   Time    ..........................................................................................................................................  Reviewed by Signature/Title    ...................................................              ..............................................  Date                                               Time          22EPIC Rev 08/18

## 2020-11-29 NOTE — ED PROVIDER NOTES
"  History     Chief Complaint:  Epistaxis    The patient was seen in room 27.  HPI   Janelle Bhat is a 65 year old female with a history of diabetes who presents with bilateral epistaxis. The patient reports she was performing her normal daily tasks this morning when she started to have right epistaxis, followed by bilateral epistaxis. Janelle Dickinson reports she held her nostrils for 1 hour without relief, prompting her visit to the Emergency Department. She denies trauma to the nose, taking blood thinners, and previous significant epistaxis. Janelle Dickinson believes she has a family history of nose bleeds.    Allergies:  No known drug allergies    Medications:    Lipitor  Zestoretic  Glucophage    Past Medical History:    Diabetes     Past Surgical History:    History reviewed. No pertinent surgical history.    Family History:    Diabetes (Mother)  Macular degeneration (Mother)  Hypertension (Sister)    Social History:  Smoking status: Never  Alcohol use: Yes  Drug use: Never  PCP: Georgina Caputo  Marital Status:  Single [1]    Review of Systems   HENT: Positive for nosebleeds.    All other systems reviewed and are negative.        Physical Exam     Patient Vitals for the past 24 hrs:   BP Temp Temp src Pulse Resp SpO2 Height Weight   11/29/20 1125 (!) 179/91 97.8  F (36.6  C) Temporal 75 20 99 % 1.626 m (5' 4\") 93 kg (205 lb)     Physical Exam  Constitutional: Alert  HENT:    Nose: Dried blood at the anterior aspect of the Kiesselbach's plexus, no active bleeding noted.   Mouth/Throat: Posterior oropharynx is clear, mucous membranes are moist  Eyes: EOM are normal. Pupils are equal, round, and reactive to light.   CV: Regular rate and rhythm, no murmurs, rubs or gallops.  Resp: Clear lungs to auscultation, all lung fields. Normal respiratory effort.   GI: Soft, non-distended. There is no tenderness. No rebound or guarding.   MSK: Normal range of motion. No deformity.   Neurological:   A/Ox3  5/5 strength is symmetric to " the upper and lower extremities;   Sensation intact to light touch throughout the upper and lower extremities;   Skin: Skin is warm and dry.    Emergency Department Course   Interventions:  AFRIN 0.05% spray    Emergency Department Course:  Past medical records, nursing notes, and vitals reviewed.  1133: I performed an exam of the patient and obtained history, as documented above.    1239: I rechecked the patient.  Findings and plan explained to the Patient. Patient discharged home with instructions regarding supportive care, medications, and reasons to return. The importance of close follow-up was reviewed.     Impression & Plan      Medical Decision Making:  This is a 65 year old female who comes in with epistaxis that started just prior to arrival. Exam as above. Bleeding stopped prior to my evaluation. No active bleeding on my exam. Dried blood at Kiesselbach's plexus. Afrin was instilled into both nares. Patient observed for 30 minutes and had no return of bleeding. Patient expressed understanding and was in agreement with the plan and discharge instructions which included reasons to return and follow-up.      Diagnosis:    ICD-10-CM    1. Epistaxis  R04.0        Disposition:  Discharged to home      Ck SWARTZ am serving as a scribe at 11:33 AM on 11/29/2020 to document services personally performed by Dalton Jaffe DO based on my observations and the provider's statements to me.        Dalton Jaffe DO  11/29/20 1848

## 2020-11-29 NOTE — ED TRIAGE NOTES
Patient has had a nose bleed for one hour.  No history of nose bleeds.      Denies blood thinners.      ABCs intact.  Alert and oriented x 3.

## 2020-12-04 ENCOUNTER — OFFICE VISIT (OUTPATIENT)
Dept: FAMILY MEDICINE | Facility: CLINIC | Age: 65
End: 2020-12-04
Payer: COMMERCIAL

## 2020-12-04 VITALS
HEART RATE: 74 BPM | DIASTOLIC BLOOD PRESSURE: 80 MMHG | TEMPERATURE: 98.8 F | OXYGEN SATURATION: 100 % | BODY MASS INDEX: 33.97 KG/M2 | HEIGHT: 64 IN | SYSTOLIC BLOOD PRESSURE: 140 MMHG | WEIGHT: 199 LBS

## 2020-12-04 DIAGNOSIS — R04.0 EPISTAXIS: ICD-10-CM

## 2020-12-04 DIAGNOSIS — I10 HYPERTENSION GOAL BP (BLOOD PRESSURE) < 130/80: Primary | ICD-10-CM

## 2020-12-04 PROCEDURE — 99214 OFFICE O/P EST MOD 30 MIN: CPT | Performed by: NURSE PRACTITIONER

## 2020-12-04 RX ORDER — AMLODIPINE BESYLATE 2.5 MG/1
2.5 TABLET ORAL DAILY
Qty: 90 TABLET | Refills: 0 | Status: SHIPPED | OUTPATIENT
Start: 2020-12-04 | End: 2020-12-21

## 2020-12-04 ASSESSMENT — MIFFLIN-ST. JEOR: SCORE: 1427.66

## 2020-12-04 NOTE — PATIENT INSTRUCTIONS
Patient Education     Nosebleed (Adult)    Bleeding from the nose most commonly occurs because of injury or drying and cracking of the inner lining of the nose. Most nosebleeds are because of dry air or nose-picking. They can occur during a common cold or an allergy attack. They can also occur on a very hot day, or from dry air in the winter.  If the bleeding site is found, it may be cauterized. This means it is treated to cause a blood clot to form. This may be done with a chemical, heat, or electricity. If the bleeding continues after the site is cauterized, or if the site cannot be found, packing may be put in your nose. This is to apply pressure and stop the bleeding. The packing may be made of gauze or sponge. A small balloon catheter is sometimes used. These must be removed by your healthcare provider. Some types of packing dissolve on their own. If you are taking blood thinning (anticoagulant) medicine, you may have a blood test.  Home care    If packing was put in your nose, unless told otherwise, do not pull on it or try to remove it yourself. You will be given an appointment to have it removed. You may also have been given antibiotics to prevent a sinus infection. If so, finish all of the medicine.    Don't blow your nose for 12 hours after the bleeding stops. This will allow a strong blood clot to form. Don't pick your nose. This may restart bleeding.    Don't drink alcohol or hot liquids for the next 2 days. Alcohol or hot liquids in your mouth can dilate blood vessels in your nose. This can cause bleeding to start again.    Don't take ibuprofen, naproxen, or medicines that contain aspirin. These thin the blood and may cause your nose to bleed. You may take acetaminophen for pain, unless another pain medicine was prescribed.    If the bleeding starts again, sit up and lean forward to prevent swallowing blood. Pinch your nose tightly on both sides, as shown above, for 10 to 15 minutes. Time yourself.  Don t release the pressure on your nose until 10 minutes is up. If bleeding does not stop, continue to pinch your nose and call your healthcare provider or return to this facility.    If you have a cold, allergies, or dry nasal membranes, lubricate the nasal passages. Apply a small amount of petroleum jelly inside the nose with a cotton swab twice a day (morning and night).    Don't overheat your home. This can dry the air and make your condition worse.    Put a humidifier in the room where you sleep. This will add moisture to the air. Clean the humidifier as advised by the .    Use a saline nasal spray to keep nasal passages moist.    Don't pick your nose. Keep fingernails trimmed to decrease risk of bleeds.    Don't smoke.    Follow-up care  Follow up with your healthcare provider, or as advised. Nasal packing should be rechecked or removed within 2 to 3 days.  When to seek medical advice  Call your healthcare provider right away if any of these occur.    You have another nosebleed that you cannot control    Dizziness, weakness, or fainting    You become tired or confused    Fever of 100.4 F (38 C) or higher, or as directed by your healthcare provider    Headache    Sinus or facial pain    Shortness of breath or trouble breathing  [employer] last reviewed this educational content on 11/1/2017 2000-2020 [employer]. [address] [place] [address], [place] [address]. All rights reserved. This information is not intended as a substitute for professional medical care. Always follow your healthcare professional's instructions.           Patient Education     Uncontrolled High Blood Pressure (Established)    Your blood pressure was unusually high today. Your blood pressure may be high for several reasons. For example, this can occur if you ve missed doses of your blood pressure medicine. Or it can happen if you are taking other medicines such as some asthma inhalers, decongestants, diet pills, and  illegal drugs like cocaine and amphetamine.   Other causes of high blood pressure include:     Weight gain    Too much salt in your diet    Smoking    Caffeine    Lack of exercise    Intense pain    Becoming upset. This means you feel fear, anger, or another strong emotion.  Blood pressure measurements are given as 2 numbers. Systolic blood pressure is the upper number. This is the pressure when the heart contracts. Diastolic blood pressure is the lower number. This is the pressure when the heart relaxes between beats. You will see your blood pressure readings written together. For example, a person with a systolic pressure of 118 and a diastolic pressure of 78 will have 118/78 written in the medical record. To be diagnosed with high blood pressure, your numbers must be higher than the normal range when tested over a period of time.   Blood pressure is categorized as normal, elevated, or stage 1 or stage 2 high blood pressure:     Normal blood pressure is systolic of less than 120 and diastolic of less than 80 (120/80)    Elevated blood pressure is systolic of 120 to 129 and diastolic less than 80    Stage 1 high blood pressure is systolic of 130 to 139 or diastolic between 80 to 89    Stage 2 high blood pressure is when systolic is 140 or higher or the diastolic is 90 or higher  Uncontrolled high blood pressure can cause serious health problems. It raises your risk for heart attack, stroke, and heart failure. But you can do many things to manage your blood pressure. In general, if you have high blood pressure, keeping your blood pressure below 130/80 mmHg may help prevent these problems. Your healthcare provider may prescribe medicine to help control blood pressure if lifestyle changes are not enough.   Home care  It s important to take steps to lower your blood pressure. If you are taking blood pressure medicine, the guidelines below may help you need less or no medicines in the future.     Start a weight-loss  program if you are overweight.    Cut back on the amount of salt in your diet:  ? Don't have high-salt foods such as olives, pickles, smoked meats, canned soups, deli meats, or salted potato chips.  ? Don t add salt to your food at the table.  ? Use only small amounts of salt when cooking.    Start an exercise program. Talk with your healthcare provider about what exercise program is best for you. It doesn t have to be difficult. Even brisk walking for 20 minutes 3 times a week is a good form of exercise.    Don't use medicines that stimulate the heart. This includes many over-the-counter cold and sinus decongestant pills and sprays, as well as diet pills. Check the warnings about high blood pressure on the label. Before purchasing any over-the-counter medicines or supplements, always ask the pharmacist about the product's potential interaction with your high blood pressure and your medicines.    Stimulants such as amphetamine or cocaine could be lethal for someone with high blood pressure. Never take these.    Limit how much caffeine you drink. Consider switching to noncaffeinated beverages.    Stop smoking. If you are a long-time smoker, this can be hard. Enroll in a stop-smoking program to make it more likely that you will succeed. Talk with your provider about ways to quit.    Learn how to handle stress better. This is an important part of any program to lower blood pressure. Learn ways to relax. These include meditation, yoga, and biofeedback.    If medicines were prescribed, take them exactly as directed. Missing doses may cause your blood pressure to get out of control. Don't stop taking your medicines, even if you feel better or you feel like you don't need them anymore. Talk with your healthcare provider.    If you miss a dose or doses of your medicines, check with your healthcare provider or pharmacist about what to do.    Consider buying an automatic blood pressure machine. Your provider may advise a  certain type. These are available at most pharmacies. It's ideal to measure your blood pressure twice a day, once in the morning, and once in the late afternoon. Try to be consistent. Check your blood pressure around the same time each day for a good comparison. Keep a written record of your home blood pressure readings and take the record to your medical appointments.  Here are some other guidelines on home blood pressure monitoring from the American [employer] Association.     Don't smoke or drink coffee for 30 minutes.    Go to the bathroom before the test.    Relax for 5 minutes before taking the measurement.    Sit correctly. Be sure your back is supported. Don't sit on a couch or soft chair. Uncross your feet and place them flat on the floor. Place your arm on a solid, flat surface like a table with the upper arm at heart level. Make certain the middle of the cuff is directly above the bend of the elbow. Check the monitor's instruction manual for an illustration.    Take multiple readings. When you measure, take 2 or 3 readings one minute apart and record all of the results.    Take your blood pressure at the same time every day, or as your healthcare provider recommends.    Record the date, time, and blood pressure reading.    Take the record with you to your next appointment. If your blood pressure monitor has a built-in memory, simply take the monitor with you to your next appointment.    Call your provider if you have several high readings. Don't be frightened by a single high reading, but if you get several high readings, check in with your healthcare provider.    Note: When blood pressure reaches a systolic (top number) of 180 or higher or a diastolic (bottom number) of 110 or higher, you need emergency medical treatment. Call your healthcare provider right away.  Follow-up care  Regular visits to your own healthcare provider for blood pressure and medicine checks are an important part of your care. Make a  follow-up appointment as directed. Bring the record of your home blood pressure readings to the appointment.   When to seek medical advice  Call your healthcare provider right away if any of these occur:    Blood pressure reaches a systolic (top number) of 180 or higher or diastolic (bottom number) of 110 or higher, emergency medical treatment is required.    Chest, arm, shoulder, neck, or upper back pain    Shortness of breath    Severe headache    Throbbing or rushing sound in the ears    Nosebleed that comes back or doesn't go away    Extreme drowsiness, confusion, or fainting    Dizziness or dizziness with spinning sensation (vertigo)    Weakness in an arm or leg or on one side of the face    Trouble speaking or seeing   [employer] last reviewed this educational content on 10/1/2019    4824-2348 [email or web address]. [address] [place] [address], [place] [address]. All rights reserved. This information is not intended as a substitute for professional medical care. Always follow your healthcare professional's instructions.

## 2020-12-04 NOTE — PROGRESS NOTES
"Subjective   Janelle Bhat is a 65 year old female who presents to clinic today for the following health issues:    HPI   ED/UC Followup:    Facility:  [facility]  Date of visit: 11/29/2020  Reason for visit: Expitaxis - Bilateral  Current Status: No longer having.      Saw podiatrist BP was elevated was also higher at ER.   Wondering if should be checking BP at home and what monitor to use    Reviewed and updated as needed this visit by provider:  Tobacco  Allergies  Meds  Problems  Med Hx  Surg Hx  Fam Hx          Review of Systems   Constitutional, HEENT, cardiovascular, pulmonary, GI, , musculoskeletal, neuro, skin, endocrine and psych systems are negative, except as otherwise noted in the HPI.          Objective   BP (!) 140/80 (BP Location: Left arm, Patient Position: Chair, Cuff Size: Adult Large)   Pulse 74   Temp 98.8  F (37.1  C) (Tympanic)   Ht 1.626 m (5' 4\")   Wt 90.3 kg (199 lb)   SpO2 100%   Breastfeeding No   BMI 34.16 kg/m   Body mass index is 34.16 kg/m .  Physical Exam   GENERAL: healthy, alert, well nourished, well hydrated, no distress  EYES: Eyes grossly normal to inspection, extraocular movements - intact, and PERRL  HENT: ear canals- normal; TMs- normal; Nose- normal; Mouth- no ulcers, no lesions  NECK: no tenderness, no adenopathy, no asymmetry, no masses, no stiffness; thyroid- normal to palpation  RESP: lungs clear to auscultation - no rales, no rhonchi, no wheezes  CV: regular rates and rhythm, normal S1 S2, no S3 or S4 and no murmur, no click or rub -  ABDOMEN: soft, no tenderness, no  hepatosplenomegaly, no masses, normal bowel sounds        Assessment & Plan   Janelle Dickinson was seen today for er f/u.    Diagnoses and all orders for this visit:    Hypertension goal BP (blood pressure) < 130/80  Not under good control.   Will add amlodipine.  Close follow up with BP and labs with wellness exam within 2 weeks.  Red flag symptoms discussed and if these occur present " "to the emergency room or call 911.  Janelle Dickinson verbalizes understanding of plan of care and is in agreement.    -     amLODIPine (NORVASC) 2.5 MG tablet; Take 1 tablet (2.5 mg) by mouth daily    Epistaxis  -     amLODIPine (NORVASC) 2.5 MG tablet; Take 1 tablet (2.5 mg) by mouth daily        BMI:   Estimated body mass index is 34.16 kg/m  as calculated from the following:    Height as of this encounter: 1.626 m (5' 4\").    Weight as of this encounter: 90.3 kg (199 lb).   Weight management plan: Discussed healthy diet and exercise guidelines    See Patient Instructions    Return in about 2 weeks (around 12/18/2020) for Wellness exam fasting labs.     ROSS Crocker-BC     [address] [address]  [email or web address]   Office: [phone]      "

## 2020-12-21 ENCOUNTER — OFFICE VISIT (OUTPATIENT)
Dept: FAMILY MEDICINE | Facility: CLINIC | Age: 65
End: 2020-12-21
Payer: COMMERCIAL

## 2020-12-21 VITALS
BODY MASS INDEX: 34.15 KG/M2 | WEIGHT: 200 LBS | SYSTOLIC BLOOD PRESSURE: 128 MMHG | DIASTOLIC BLOOD PRESSURE: 81 MMHG | HEART RATE: 77 BPM | TEMPERATURE: 97.3 F | HEIGHT: 64 IN | OXYGEN SATURATION: 100 %

## 2020-12-21 DIAGNOSIS — Z12.31 ENCOUNTER FOR SCREENING MAMMOGRAM FOR BREAST CANCER: ICD-10-CM

## 2020-12-21 DIAGNOSIS — E87.1 LOW SODIUM LEVELS: ICD-10-CM

## 2020-12-21 DIAGNOSIS — R04.0 EPISTAXIS: ICD-10-CM

## 2020-12-21 DIAGNOSIS — Z13.0 SCREENING FOR DEFICIENCY ANEMIA: ICD-10-CM

## 2020-12-21 DIAGNOSIS — E11.9 TYPE 2 DIABETES MELLITUS WITHOUT COMPLICATION, WITHOUT LONG-TERM CURRENT USE OF INSULIN (H): ICD-10-CM

## 2020-12-21 DIAGNOSIS — E78.5 HYPERLIPIDEMIA LDL GOAL <100: ICD-10-CM

## 2020-12-21 DIAGNOSIS — Z11.59 NEED FOR HEPATITIS C SCREENING TEST: ICD-10-CM

## 2020-12-21 DIAGNOSIS — Z13.29 SCREENING FOR THYROID DISORDER: ICD-10-CM

## 2020-12-21 DIAGNOSIS — I10 HYPERTENSION GOAL BP (BLOOD PRESSURE) < 130/80: ICD-10-CM

## 2020-12-21 DIAGNOSIS — Z78.0 POST-MENOPAUSAL: ICD-10-CM

## 2020-12-21 DIAGNOSIS — Z00.00 ENCOUNTER FOR MEDICARE ANNUAL WELLNESS EXAM: Primary | ICD-10-CM

## 2020-12-21 LAB
ALBUMIN SERPL-MCNC: 3.8 G/DL (ref 3.4–5)
ALP SERPL-CCNC: 59 U/L (ref 40–150)
ALT SERPL W P-5'-P-CCNC: 24 U/L (ref 0–50)
ANION GAP SERPL CALCULATED.3IONS-SCNC: 3 MMOL/L (ref 3–14)
AST SERPL W P-5'-P-CCNC: 17 U/L (ref 0–45)
BILIRUB SERPL-MCNC: 0.5 MG/DL (ref 0.2–1.3)
BUN SERPL-MCNC: 12 MG/DL (ref 7–30)
CALCIUM SERPL-MCNC: 9.4 MG/DL (ref 8.5–10.1)
CHLORIDE SERPL-SCNC: 97 MMOL/L (ref 94–109)
CHOLEST SERPL-MCNC: 116 MG/DL
CO2 SERPL-SCNC: 32 MMOL/L (ref 20–32)
CREAT SERPL-MCNC: 0.67 MG/DL (ref 0.52–1.04)
CREAT UR-MCNC: 66 MG/DL
ERYTHROCYTE [DISTWIDTH] IN BLOOD BY AUTOMATED COUNT: 12.1 % (ref 10–15)
GFR SERPL CREATININE-BSD FRML MDRD: >90 ML/MIN/{1.73_M2}
GLUCOSE SERPL-MCNC: 93 MG/DL (ref 70–99)
HBA1C MFR BLD: 6.3 % (ref 0–5.6)
HCT VFR BLD AUTO: 37.3 % (ref 35–47)
HDLC SERPL-MCNC: 55 MG/DL
HGB BLD-MCNC: 12.2 G/DL (ref 11.7–15.7)
LDLC SERPL CALC-MCNC: 46 MG/DL
MCH RBC QN AUTO: 29.1 PG (ref 26.5–33)
MCHC RBC AUTO-ENTMCNC: 32.7 G/DL (ref 31.5–36.5)
MCV RBC AUTO: 89 FL (ref 78–100)
MICROALBUMIN UR-MCNC: <5 MG/L
MICROALBUMIN/CREAT UR: NORMAL MG/G CR (ref 0–25)
NONHDLC SERPL-MCNC: 61 MG/DL
PLATELET # BLD AUTO: 265 10E9/L (ref 150–450)
POTASSIUM SERPL-SCNC: 4.1 MMOL/L (ref 3.4–5.3)
PROT SERPL-MCNC: 7.3 G/DL (ref 6.8–8.8)
RBC # BLD AUTO: 4.19 10E12/L (ref 3.8–5.2)
SODIUM SERPL-SCNC: 132 MMOL/L (ref 133–144)
TRIGL SERPL-MCNC: 74 MG/DL
TSH SERPL DL<=0.005 MIU/L-ACNC: 2.05 MU/L (ref 0.4–4)
WBC # BLD AUTO: 7.1 10E9/L (ref 4–11)

## 2020-12-21 PROCEDURE — 83036 HEMOGLOBIN GLYCOSYLATED A1C: CPT | Performed by: NURSE PRACTITIONER

## 2020-12-21 PROCEDURE — G0438 PPPS, INITIAL VISIT: HCPCS | Performed by: NURSE PRACTITIONER

## 2020-12-21 PROCEDURE — 84443 ASSAY THYROID STIM HORMONE: CPT | Performed by: NURSE PRACTITIONER

## 2020-12-21 PROCEDURE — 80053 COMPREHEN METABOLIC PANEL: CPT | Performed by: NURSE PRACTITIONER

## 2020-12-21 PROCEDURE — 36415 COLL VENOUS BLD VENIPUNCTURE: CPT | Performed by: NURSE PRACTITIONER

## 2020-12-21 PROCEDURE — 86803 HEPATITIS C AB TEST: CPT | Performed by: NURSE PRACTITIONER

## 2020-12-21 PROCEDURE — 99214 OFFICE O/P EST MOD 30 MIN: CPT | Mod: 25 | Performed by: NURSE PRACTITIONER

## 2020-12-21 PROCEDURE — 85027 COMPLETE CBC AUTOMATED: CPT | Performed by: NURSE PRACTITIONER

## 2020-12-21 PROCEDURE — 80061 LIPID PANEL: CPT | Performed by: NURSE PRACTITIONER

## 2020-12-21 PROCEDURE — 82043 UR ALBUMIN QUANTITATIVE: CPT | Performed by: NURSE PRACTITIONER

## 2020-12-21 RX ORDER — LISINOPRIL AND HYDROCHLOROTHIAZIDE 12.5; 2 MG/1; MG/1
2 TABLET ORAL DAILY
Qty: 180 TABLET | Refills: 3 | Status: SHIPPED | OUTPATIENT
Start: 2020-12-21 | End: 2022-01-20

## 2020-12-21 RX ORDER — AMLODIPINE BESYLATE 2.5 MG/1
2.5 TABLET ORAL DAILY
Qty: 90 TABLET | Refills: 3 | Status: SHIPPED | OUTPATIENT
Start: 2020-12-21 | End: 2022-02-01

## 2020-12-21 RX ORDER — METFORMIN HCL 500 MG
2000 TABLET, EXTENDED RELEASE 24 HR ORAL
Qty: 360 TABLET | Refills: 3 | Status: SHIPPED | OUTPATIENT
Start: 2020-12-21 | End: 2022-02-01

## 2020-12-21 RX ORDER — ATORVASTATIN CALCIUM 40 MG/1
40 TABLET, FILM COATED ORAL DAILY
Qty: 90 TABLET | Refills: 3 | Status: SHIPPED | OUTPATIENT
Start: 2020-12-21 | End: 2022-02-01

## 2020-12-21 ASSESSMENT — ACTIVITIES OF DAILY LIVING (ADL): CURRENT_FUNCTION: NO ASSISTANCE NEEDED

## 2020-12-21 ASSESSMENT — MIFFLIN-ST. JEOR: SCORE: 1432.19

## 2020-12-21 NOTE — PROGRESS NOTES
"SUBJECTIVE:   Janelle Bhat is a 65 year old female who presents for Preventive Visit.      Patient has been advised of split billing requirements and indicates understanding: Yes   Are you in the first 12 months of your Medicare coverage?  No    Healthy Habits:    In general, how would you rate your overall health?  Good    Frequency of exercise:: No routine - walks as much and can ansd tries to keep moving.    Do you usually eat at least 4 servings of fruit and vegetables a day, include whole grains    & fiber and avoid regularly eating high fat or \"junk\" foods?  No (2-3 servings)    Taking medications regularly:  No    Barriers to taking medications:  None    Medication side effects:  None    Ability to successfully perform activities of daily living:  No assistance needed    Home Safety:  No safety concerns identified    Hearing Impairment:  No hearing concerns    In the past 6 months, have you been bothered by leaking of urine?  No    In general, how would you rate your overall mental or emotional health?  Excellent      PHQ-2 Total Score:    Do you feel safe in your environment? Yes    Have you ever done Advance Care Planning? (For example, a Health Directive, POLST, or a discussion with a medical provider or your loved ones about your wishes): Yes, patient states has an Advance Care Planning document and will bring a copy to the clinic.      Doing well. Plans to spend gumaro with her sister.     Fall risk  Fallen 2 or more times in the past year?: No  Any fall with injury in the past year?: No    Cognitive Screening   1) Repeat 3 items (Leader, Season, Table)    2) Clock draw: NORMAL  3) 3 item recall: Recalls 3 objects  Results: 3 items recalled: COGNITIVE IMPAIRMENT LESS LIKELY    Mini-CogTM Copyright ARISTEO Hernandez. Licensed by the author for use in [facility]; reprinted with permission (jennifer@.[facility]). All rights reserved.      Do you have sleep apnea, excessive snoring or daytime " drowsiness?: no    Reviewed and updated as needed this visit by clinical staff  Tobacco  Allergies  Meds  Problems  Med Hx  Surg Hx  Fam Hx  Soc Hx          Reviewed and updated as needed this visit by Provider  Tobacco  Allergies  Meds  Problems  Med Hx  Surg Hx  Fam Hx         Social History     Tobacco Use     Smoking status: Never Smoker     Smokeless tobacco: Never Used   Substance Use Topics     Alcohol use: Yes     Comment: 0-2 Q1M     If you drink alcohol do you typically have >3 drinks per day or >7 drinks per week? No    Alcohol Use 12/21/2020   Prescreen: >3 drinks/day or >7 drinks/week? No       Diabetes Follow-up    How often are you checking your blood sugar? One time daily - every other day  What time of day are you checking your blood sugars (select all that apply)?  varies  Have you had any blood sugars above 200?  No  Have you had any blood sugars below 70?  No    What symptoms do you notice when your blood sugar is low?  Other: feels hungry and woozy in stomach    What concerns do you have today about your diabetes? None     Do you have any of these symptoms? (Select all that apply)  No numbness or tingling in feet.  No redness, sores or blisters on feet.  No complaints of excessive thirst.  No reports of blurry vision.  No significant changes to weight.    Have you had a diabetic eye exam in the last 12 months? No      Hemoglobin A1C (%)   Date Value   12/21/2020 6.3 (H)       Hyperlipidemia Follow-Up      Are you regularly taking any medication or supplement to lower your cholesterol?   Yes- lipitor    Are you having muscle aches or other side effects that you think could be caused by your cholesterol lowering medication?  No    Hypertension Follow-up      Do you check your blood pressure regularly outside of the clinic? No     Are you following a low salt diet? Dos not add salt    Are your blood pressures ever more than 140 on the top number (systolic) OR more   than 90 on the  bottom number (diastolic), for example 140/90? Unsure    Recently started on amlodipine low-dose due to elevated BP.  Blood pressure is much better within goal.  She has no leg swelling or side effects.  BP Readings from Last 3 Encounters:   12/21/20 128/81   12/04/20 (!) 140/80   11/29/20 (!) 179/91       Current providers sharing in care for this patient include:   Patient Care Team:  Georgina Caputo APRN CNP as PCP - General (Nurse Practitioner)  Georgina Caputo APRN CNP as Assigned PCP  Bryson Ray DPM as Assigned Musculoskeletal Provider    The following health maintenance items are reviewed in Epic and correct as of today:  Health Maintenance   Topic Date Due     DEXA  1955     EYE EXAM  1955     MAMMO SCREENING  1955     FALL RISK ASSESSMENT  10/30/2019     ZOSTER IMMUNIZATION (1 of 2) 10/21/2021 (Originally 10/30/2004)     COLORECTAL CANCER SCREENING  12/15/2021 (Originally 1955)     Pneumococcal Vaccine: 65+ Years (1 of 1 - PPSV23) 12/22/2021 (Originally 10/30/2019)     DTAP/TDAP/TD IMMUNIZATION (1 - Tdap) 12/23/2021 (Originally 10/30/1979)     A1C  06/21/2021     DIABETIC FOOT EXAM  11/02/2021     MEDICARE ANNUAL WELLNESS VISIT  12/21/2021     BMP  12/21/2021     LIPID  12/21/2021     MICROALBUMIN  12/21/2021     ANNUAL REVIEW OF HM ORDERS  12/21/2021     ADVANCE CARE PLANNING  12/21/2025     HEPATITIS C SCREENING  Completed     PHQ-2  Completed     INFLUENZA VACCINE  Completed     Pneumococcal Vaccine: Pediatrics (0 to 5 Years) and At-Risk Patients (6 to 64 Years)  Aged Out     IPV IMMUNIZATION  Aged Out     MENINGITIS IMMUNIZATION  Aged Out     Lab work is in process  Labs reviewed in EPIC  BP Readings from Last 3 Encounters:   12/21/20 128/81   12/04/20 (!) 140/80   11/29/20 (!) 179/91    Wt Readings from Last 3 Encounters:   12/21/20 90.7 kg (200 lb)   12/04/20 90.3 kg (199 lb)   11/29/20 93 kg (205 lb)           There is no problem list on file for this  "patient.    History reviewed. No pertinent surgical history.    Social History     Tobacco Use     Smoking status: Never Smoker     Smokeless tobacco: Never Used   Substance Use Topics     Alcohol use: Yes     Comment: 0-2 Q1M     Family History   Problem Relation Age of Onset     Diabetes Mother      Macular Degeneration Mother      Hypertension Sister      Skin Cancer Sister          Current Outpatient Medications   Medication Sig Dispense Refill     amLODIPine (NORVASC) 2.5 MG tablet Take 1 tablet (2.5 mg) by mouth daily 90 tablet 3     atorvastatin (LIPITOR) 40 MG tablet Take 1 tablet (40 mg) by mouth daily 90 tablet 3     lisinopril-hydrochlorothiazide (ZESTORETIC) 20-12.5 MG tablet Take 2 tablets by mouth daily 180 tablet 3     metFORMIN (GLUCOPHAGE-XR) 500 MG 24 hr tablet Take 4 tablets (2,000 mg) by mouth daily (with dinner) 4 tablets daily with dinner 360 tablet 3     vitamin C (ASCORBIC ACID) 1000 MG TABS Take 1,000 mg by mouth daily       vitamin D3 (CHOLECALCIFEROL) 50 mcg (2000 units) tablet Take 1 tablet by mouth daily       Received medical records from [place].  Need to review to look at status of pneumonia vaccination, colonoscopy, DEXA scanning and tetanus.    No Known Allergies  Mammogram Screening: Mammogram Screening: Patient over age 50, mutual decision to screen reflected in health maintenance.    Review of Systems  Constitutional, HEENT, cardiovascular, pulmonary, GI, , musculoskeletal, neuro, skin, endocrine and psych systems are negative, except as otherwise noted in the HPI.    OBJECTIVE:   /81 (BP Location: Right arm, Patient Position: Chair, Cuff Size: Adult Large)   Pulse 77   Temp 97.3  F (36.3  C) (Tympanic)   Ht 1.626 m (5' 4\")   Wt 90.7 kg (200 lb)   SpO2 100%   Breastfeeding No   BMI 34.33 kg/m   Estimated body mass index is 34.33 kg/m  as calculated from the following:    Height as of this encounter: 1.626 m (5' 4\").    Weight as of this encounter: 90.7 kg (200 " lb).  Physical Exam  GENERAL: healthy, alert and no distress  EYES: Eyes grossly normal to inspection, PERRL and conjunctivae and sclerae normal  HENT: ear canals and TM's normal, nose and mouth without ulcers or lesions  NECK: no adenopathy, no asymmetry, masses, or scars and thyroid normal to palpation  RESP: lungs clear to auscultation - no rales, rhonchi or wheezes  BREAST: very dense pendulous breasts, normal without masses, tenderness or nipple discharge and no palpable axillary masses or adenopathy  CV: regular rate and rhythm, normal S1 S2, no S3 or S4, no murmur, click or rub, no peripheral edema and peripheral pulses strong  ABDOMEN: soft, nontender, no hepatosplenomegaly, no masses and bowel sounds normal  MS: no gross musculoskeletal defects noted, no edema  SKIN: no suspicious lesions or rashes  NEURO: Normal strength and tone, mentation intact and speech normal  PSYCH: mentation appears normal, affect normal/bright    Diagnostic Test Results:  Labs reviewed in Epic    ASSESSMENT / PLAN:   Janelle Dickinson was seen today for physical.    Diagnoses and all orders for this visit:    Encounter for Medicare annual wellness exam  Well woman exam with breast exam  completed today.    Fasting labs today.    Will notify of lab results.    Hypertension goal BP (blood pressure) < 130/80  No concerns.  Stable now since adding amlodipine.  Continue same medication this was refilled today.  65541  -     Albumin Random Urine Quantitative with Creat Ratio  -     lisinopril-hydrochlorothiazide (ZESTORETIC) 20-12.5 MG tablet; Take 2 tablets by mouth daily  -     Comprehensive metabolic panel (BMP + Alb, Alk Phos, ALT, AST, Total. Bili, TP)  -     amLODIPine (NORVASC) 2.5 MG tablet; Take 1 tablet (2.5 mg) by mouth daily    Hyperlipidemia LDL goal <100  No concerns.  Stable.  Continue same medication this was refilled today.  34607  -     Lipid panel reflex to direct LDL Fasting  -     atorvastatin (LIPITOR) 40 MG tablet; Take 1  "tablet (40 mg) by mouth daily    Type 2 diabetes mellitus without complication, without long-term current use of insulin (H)  76188  No concerns.  Stable.  Continue same medication this was refilled today.  -     HEMOGLOBIN A1C  -     Albumin Random Urine Quantitative with Creat Ratio  -     metFORMIN (GLUCOPHAGE-XR) 500 MG 24 hr tablet; Take 4 tablets (2,000 mg) by mouth daily (with dinner) 4 tablets daily with dinner  -     Comprehensive metabolic panel (BMP + Alb, Alk Phos, ALT, AST, Total. Bili, TP)    Need for hepatitis C screening test  -     Hepatitis C Screen Reflex to HCV RNA Quant and Genotype    Epistaxis  -     amLODIPine (NORVASC) 2.5 MG tablet; Take 1 tablet (2.5 mg) by mouth daily    Screening for thyroid disorder  -     TSH with free T4 reflex    Screening for deficiency anemia  -     CBC with platelets    Encounter for screening mammogram for breast cancer  -     Cancel: MA SCREENING DIGITAL BILAT - Future  (s+30); Future  -     MA Screen Bilateral w/Joaquín; Future    Post-menopausal  -     DEXA HIP/PELVIS/SPINE - Future; Future    Other orders  -     REVIEW OF HEALTH MAINTENANCE PROTOCOL ORDERS      Patient has been advised of split billing requirements and indicates understanding: Yes  COUNSELING:  Reviewed preventive health counseling, as reflected in patient instructions       Regular exercise       Healthy diet/nutrition    Estimated body mass index is 34.33 kg/m  as calculated from the following:    Height as of this encounter: 1.626 m (5' 4\").    Weight as of this encounter: 90.7 kg (200 lb).    Weight management plan: Discussed healthy diet and exercise guidelines    She reports that she has never smoked. She has never used smokeless tobacco.      Appropriate preventive services were discussed with this patient, including applicable screening as appropriate for cardiovascular disease, diabetes, osteopenia/osteoporosis, and glaucoma.  As appropriate for age/gender, discussed screening for " colorectal cancer, prostate cancer, breast cancer, and cervical cancer. Checklist reviewing preventive services available has been given to the patient.    Reviewed patients plan of care and provided an AVS. The Basic Care Plan (routine screening as documented in Health Maintenance) for Janelle Dickinson meets the Care Plan requirement. This Care Plan has been established and reviewed with the Patient.    Counseling Resources:  ATP IV Guidelines  Pooled Cohorts Equation Calculator  Breast Cancer Risk Calculator  Breast Cancer: Medication to Reduce Risk  FRAX Risk Assessment  ICSI Preventive Guidelines  Dietary Guidelines for Americans, 2010  USDA's MyPlate  ASA Prophylaxis  Lung CA Screening    Georgina Caputo, FNP-[facility]    Identified Health Risks:

## 2020-12-21 NOTE — Clinical Note
Please abstract the following data from this visit with this patient into the appropriate field in Epic:    Tests that can be patient reported without a hard copy:    Mammogram done on this date: 07/18/2019 (approximately), by this group: [facility], results were normal.  and Eye exam with ophthalmology on this date: 07/19/2019 - [facility]-    Other Tests found in the patient's chart through Chart Review/Care Everywhere:    {Abstract Quality List (Optional):[id number]}    Note to Abstraction: If this section is blank, no results were found via Chart Review/Care Everywhere.

## 2020-12-21 NOTE — PATIENT INSTRUCTIONS
Patient Education   Personalized Prevention Plan  You are due for the preventive services outlined below.  Your care team is available to assist you in scheduling these services.  If you have already completed any of these items, please share that information with your care team to update in your medical record.  Health Maintenance Due   Topic Date Due     A1C Lab  1955     Basic Metabolic Panel  1955     Osteoporosis Screening  1955     Cholesterol Lab  1955     Kidney Microalbumin Urine Test  1955     ANNUAL REVIEW OF HM ORDERS  1955     Discuss Advance Care Planning  1955     Eye Exam  1955     Mammogram  1955     Colorectal Cancer Screening  10/30/1964     Hepatitis C Screening  10/30/1972     Diptheria Tetanus Pertussis (DTAP/TDAP/TD) Vaccine (1 - Tdap) 10/30/1979     Annual Wellness Visit  10/30/2019     FALL RISK ASSESSMENT  10/30/2019     Pneumococcal Vaccine (1 of 1 - PPSV23) 10/30/2019     PHQ-2  01/01/2020

## 2020-12-22 LAB — HCV AB SERPL QL IA: NONREACTIVE

## 2020-12-23 NOTE — RESULT ENCOUNTER NOTE
Note to Staff: please call the patient to explain results .     Labs overall look great are normal with good cholesterol and diabetic control and good kidney and liver function. Continue same medications.     The only abnormal is sodium very mild discussed below.     -Sodium is decreased.  ADVISE: Avoid excessive water intake. rechecking this in 1 month.    Georgina Caputo, [facility]-BC    For additional lab test information, [email or web address] is an excellent reference.

## 2021-03-08 ENCOUNTER — IMMUNIZATION (OUTPATIENT)
Dept: NURSING | Facility: CLINIC | Age: 66
End: 2021-03-08
Payer: COMMERCIAL

## 2021-03-08 PROCEDURE — 91303 PR COVID VAC JANSSEN AD26 0.5ML: CPT

## 2021-03-08 PROCEDURE — 0031A PR COVID VAC JANSSEN AD26 0.5ML: CPT

## 2021-05-27 ENCOUNTER — HOSPITAL ENCOUNTER (OUTPATIENT)
Dept: MAMMOGRAPHY | Facility: CLINIC | Age: 66
Discharge: HOME | End: 2021-05-27
Attending: NURSE PRACTITIONER | Admitting: NURSE PRACTITIONER
Payer: COMMERCIAL

## 2021-05-27 ENCOUNTER — ANCILLARY PROCEDURE (OUTPATIENT)
Dept: BONE DENSITY | Facility: CLINIC | Age: 66
End: 2021-05-27
Attending: NURSE PRACTITIONER
Payer: COMMERCIAL

## 2021-05-27 DIAGNOSIS — Z12.31 ENCOUNTER FOR SCREENING MAMMOGRAM FOR BREAST CANCER: ICD-10-CM

## 2021-05-27 DIAGNOSIS — Z78.0 POST-MENOPAUSAL: ICD-10-CM

## 2021-05-27 PROCEDURE — 77063 BREAST TOMOSYNTHESIS BI: CPT

## 2021-05-27 PROCEDURE — 77080 DXA BONE DENSITY AXIAL: CPT | Performed by: INTERNAL MEDICINE

## 2021-05-31 NOTE — RESULT ENCOUNTER NOTE
Dear Janelle Dickinson,    Here is a summary of your recent test results:    Your Dexa can is normal. Recommendations include ensuring adequate Calcium and Vitamin D in your diet.    For additional lab test information, [email or web address] is an excellent reference.    In addition, here is a list of due or overdue Health Maintenance reminders:    Eye Exam due on 07/19/2020  PHQ-2 due on 01/01/2021    Please call us at [phone] (or use [email or web address]) to address the above recommendations if needed.    Thank you for choosing  [facility] [place].  It was an honor and a privilege to participate in your care.       Healthy regards,    Georgina Caputo, ROSS  [facility]

## 2021-06-08 NOTE — RESULT ENCOUNTER NOTE
Dear Janelle Dickinson,    Here is a summary of your recent test results:    -Mammogram was normal.  ADVISE: rechecking in 1 year.    In addition, here is a list of due or overdue Health Maintenance reminders:    Eye Exam due on 07/19/2020  PHQ-2 due on 01/01/2021  A1C Lab due on 06/21/2021    Please call us at [phone] (or use [email or web address]) to address the above recommendations or have any questions.    Thank you for choosing [facility].  It was an honor and a privilege to participate in your care today.     Healthy regards,    ROSS Crocker

## 2021-06-30 ENCOUNTER — OFFICE VISIT (OUTPATIENT)
Dept: OPTOMETRY | Facility: CLINIC | Age: 66
End: 2021-06-30
Payer: COMMERCIAL

## 2021-06-30 DIAGNOSIS — H52.4 PRESBYOPIA: ICD-10-CM

## 2021-06-30 DIAGNOSIS — H52.203 MYOPIA OF BOTH EYES WITH ASTIGMATISM: ICD-10-CM

## 2021-06-30 DIAGNOSIS — H52.13 MYOPIA OF BOTH EYES WITH ASTIGMATISM: ICD-10-CM

## 2021-06-30 DIAGNOSIS — E11.9 TYPE 2 DIABETES MELLITUS WITHOUT RETINOPATHY (H): Primary | ICD-10-CM

## 2021-06-30 PROCEDURE — 92015 DETERMINE REFRACTIVE STATE: CPT | Performed by: OPTOMETRIST

## 2021-06-30 PROCEDURE — 92004 COMPRE OPH EXAM NEW PT 1/>: CPT | Performed by: OPTOMETRIST

## 2021-06-30 ASSESSMENT — EXTERNAL EXAM - LEFT EYE: OS_EXAM: NORMAL

## 2021-06-30 ASSESSMENT — TONOMETRY
OS_IOP_MMHG: 17
OD_IOP_MMHG: 17
IOP_METHOD: APPLANATION

## 2021-06-30 ASSESSMENT — VISUAL ACUITY
OD_CC: 20/20
METHOD: SNELLEN - LINEAR
OD_SC: 20/150
OS_CC: 20/30+2
OD_CC: 20/20-2
OS_CC+: -1
OS_SC: 20/150
OS_CC: 20/20
CORRECTION_TYPE: GLASSES

## 2021-06-30 ASSESSMENT — REFRACTION_MANIFEST
OS_CYLINDER: +1.75
OS_SPHERE: -4.75
OD_CYLINDER: +0.50
METHOD_AUTOREFRACTION: 1
OS_SPHERE: -4.50
OD_AXIS: 177
OD_SPHERE: -3.50
OD_SPHERE: -3.50
OD_ADD: +2.75
OD_AXIS: 158
OS_AXIS: 039
OS_CYLINDER: +1.75
OD_CYLINDER: +1.50
OS_AXIS: 039

## 2021-06-30 ASSESSMENT — CUP TO DISC RATIO
OD_RATIO: 0.1
OS_RATIO: 0.1

## 2021-06-30 ASSESSMENT — REFRACTION_WEARINGRX
OS_ADD: +2.50
OD_AXIS: 159
OS_CYLINDER: +1.50
OD_ADD: +2.50
OS_SPHERE: -4.00
SPECS_TYPE: PAL
OD_SPHERE: -3.25
OD_CYLINDER: +1.00
OS_AXIS: 047

## 2021-06-30 ASSESSMENT — CONF VISUAL FIELD
OS_NORMAL: 1
OD_NORMAL: 1
METHOD: COUNTING FINGERS

## 2021-06-30 ASSESSMENT — EXTERNAL EXAM - RIGHT EYE: OD_EXAM: NORMAL

## 2021-06-30 NOTE — PATIENT INSTRUCTIONS
Patient Education   Diabetes weakens the blood vessels all over the body, including the eyes. Damage to the blood vessels in the eyes can cause swelling or bleeding into part of the eye (called the retina). This is called diabetic retinopathy (SG-tin--pu-thee). If not treated, this disease can cause vision loss or blindness.   Symptoms may include blurred or distorted vision, but many people have no symptoms. It's important to see your eye doctor regularly to check for problems.   Early treatment and good control can help protect your vision. Here are the things you can do to help prevent vision loss:      1. Keep your blood sugar levels under tight control.      2. Bring high blood pressure under control.      3. No smoking.      4. Have yearly dilated eye exams.

## 2021-06-30 NOTE — LETTER
"    6/30/2021         RE: Janelle Bhat  [address] [id number]        Dear Colleague,    Thank you for referring your patient, Janelle Bhat, to the [facility]. Please see a copy of my visit note below.    Chief Complaint   Patient presents with     Diabetic Eye Exam     Would like to update her glasses Rx \"my eyes are getting old\"  No other concerns at this time.  Would like to get suns with a PAL- struggles with reading the dash while driving in [place] s[place].   Pt checks her blood sugar about 3 times a week. She reports her average is 100-160      Lab Results   Component Value Date    A1C 6.3 12/21/2020    A1C 6.4 06/18/2020    A1C 6.4 10/25/2019    A1C 6.6 05/02/2019       Last Eye Exam: 2019  Dilated Previously: Yes. Signs and symptoms of dilation were discussed. Patient consents to dilation today.    What are you currently using to see?  glasses    Distance Vision Acuity: Noticed gradual change    Near Vision Acuity: Not satisfied     Eye Comfort: good  Do you use eye drops? : No      Laya Aiken CPO     Medical, surgical and family histories reviewed and updated 6/30/2021.       OBJECTIVE: See Ophthalmology exam    ASSESSMENT:  No diagnosis found.   PLAN:    Janelle Bhat aware  eye exam results will be sent to Georgina Caputo.    Tonie Matthews OD       Again, thank you for allowing me to participate in the care of your patient.        Sincerely,        Tonie Matthews, OD    "

## 2021-06-30 NOTE — PROGRESS NOTES
"Chief Complaint   Patient presents with     Diabetic Eye Exam     Would like to update her glasses Rx \"my eyes are getting old\"  No other concerns at this time.  Would like to get suns with a PAL- struggles with reading the dash while driving in [place].   Pt checks her blood sugar about 3 times a week. She reports her average is 100-160      Lab Results   Component Value Date    A1C 6.3 12/21/2020    A1C 6.4 06/18/2020    A1C 6.4 10/25/2019    A1C 6.6 05/02/2019       Last Eye Exam: 2019  Dilated Previously: Yes. Signs and symptoms of dilation were discussed. Patient consents to dilation today.    What are you currently using to see?  glasses    Distance Vision Acuity: Noticed gradual change    Near Vision Acuity: Not satisfied     Eye Comfort: good  Do you use eye drops? : No      Laya Aiken CPO     Medical, surgical and family histories reviewed and updated 6/30/2021.       OBJECTIVE: See Ophthalmology exam    ASSESSMENT:    ICD-10-CM    1. Type 2 diabetes mellitus without retinopathy (H)  E11.9 REFRACTION     EYE EXAM (SIMPLE-NONBILLABLE)      2. Myopia of both eyes with astigmatism  H52.13 REFRACTION    H52.203       3. Presbyopia  H52.4          PLAN:    Janelle Bhat aware  eye exam results will be sent to Georgina Caputo.    Tonie Matthews OD   "

## 2021-08-15 ENCOUNTER — HEALTH MAINTENANCE LETTER (OUTPATIENT)
Age: 66
End: 2021-08-15

## 2021-08-17 ENCOUNTER — LAB (OUTPATIENT)
Dept: LAB | Facility: CLINIC | Age: 66
End: 2021-08-17
Payer: COMMERCIAL

## 2021-08-17 ENCOUNTER — DOCUMENTATION ONLY (OUTPATIENT)
Dept: LAB | Facility: CLINIC | Age: 66
End: 2021-08-17

## 2021-08-17 ENCOUNTER — MYC MEDICAL ADVICE (OUTPATIENT)
Dept: FAMILY MEDICINE | Facility: CLINIC | Age: 66
End: 2021-08-17

## 2021-08-17 DIAGNOSIS — E11.9 TYPE 2 DIABETES MELLITUS WITHOUT COMPLICATION, WITHOUT LONG-TERM CURRENT USE OF INSULIN (H): ICD-10-CM

## 2021-08-17 DIAGNOSIS — E11.9 TYPE 2 DIABETES MELLITUS WITHOUT COMPLICATION, WITHOUT LONG-TERM CURRENT USE OF INSULIN (H): Primary | ICD-10-CM

## 2021-08-17 DIAGNOSIS — E87.1 LOW SODIUM LEVELS: ICD-10-CM

## 2021-08-17 LAB — HBA1C MFR BLD: 6.3 % (ref 0–5.6)

## 2021-08-17 PROCEDURE — 84295 ASSAY OF SERUM SODIUM: CPT

## 2021-08-17 PROCEDURE — 83036 HEMOGLOBIN GLYCOSYLATED A1C: CPT

## 2021-08-17 PROCEDURE — 36415 COLL VENOUS BLD VENIPUNCTURE: CPT

## 2021-08-17 NOTE — PROGRESS NOTES
PATIENT REQUESTING HGBA1C. LAUREANO BLOOD AND RAN TEST JUST IN CASE. PATIENT STATES SHE WAS NOTIFIED BY [employer] FOR OVERDUE A1C, WALKED IN.

## 2021-08-18 LAB — SODIUM SERPL-SCNC: 133 MMOL/L (ref 133–144)

## 2021-08-18 NOTE — RESULT ENCOUNTER NOTE
Dear Janelle Dickinson,    Here is a summary of your recent test results:    -A1C (test of diabetes control the last 2-3 months) is at your goal.     -Sodium is normal.    For additional lab test information, [email or web address] is an excellent reference.    In addition, here is a list of due or overdue Health Maintenance reminders:    PHQ-2 due on 01/01/2021    Please call us at [phone] (or use [employer]) to address the above recommendations if needed.    Thank you for choosing  [facility] [place].  It was an honor and a privilege to participate in your care.       Healthy regards,    Georgina Caputo, JUANP  [facility]

## 2021-08-18 NOTE — TELEPHONE ENCOUNTER
My chart message sent     Awaiting reply     Mackenzie Kenney RN, BSN  [facility]

## 2021-10-11 ENCOUNTER — HEALTH MAINTENANCE LETTER (OUTPATIENT)
Age: 66
End: 2021-10-11

## 2022-01-18 DIAGNOSIS — I10 HYPERTENSION GOAL BP (BLOOD PRESSURE) < 130/80: ICD-10-CM

## 2022-01-20 RX ORDER — LISINOPRIL AND HYDROCHLOROTHIAZIDE 12.5; 2 MG/1; MG/1
2 TABLET ORAL DAILY
Qty: 180 TABLET | Refills: 0 | Status: SHIPPED | OUTPATIENT
Start: 2022-01-20 | End: 2022-02-01

## 2022-01-20 NOTE — TELEPHONE ENCOUNTER
Patient due for fasting office visit- 90 days supply given.  Routing to team to schedule appointment     Eboni PANG RN  [facility]  [phone]

## 2022-01-29 ASSESSMENT — ENCOUNTER SYMPTOMS
COUGH: 0
CONSTIPATION: 0
HEMATOCHEZIA: 0
ABDOMINAL PAIN: 0
SHORTNESS OF BREATH: 0
FEVER: 0
CHILLS: 0
PALPITATIONS: 0
NAUSEA: 0
HEMATURIA: 0
NERVOUS/ANXIOUS: 0
DYSURIA: 0
DIARRHEA: 0
PARESTHESIAS: 0
BREAST MASS: 0
JOINT SWELLING: 0
ARTHRALGIAS: 0
HEARTBURN: 0
MYALGIAS: 0
EYE PAIN: 0
HEADACHES: 0
DIZZINESS: 0
FREQUENCY: 0
WEAKNESS: 0
SORE THROAT: 0

## 2022-01-29 ASSESSMENT — ACTIVITIES OF DAILY LIVING (ADL): CURRENT_FUNCTION: NO ASSISTANCE NEEDED

## 2022-01-30 ENCOUNTER — HEALTH MAINTENANCE LETTER (OUTPATIENT)
Age: 67
End: 2022-01-30

## 2022-02-01 ENCOUNTER — OFFICE VISIT (OUTPATIENT)
Dept: FAMILY MEDICINE | Facility: CLINIC | Age: 67
End: 2022-02-01
Payer: COMMERCIAL

## 2022-02-01 VITALS
WEIGHT: 203 LBS | TEMPERATURE: 97.6 F | HEIGHT: 64 IN | DIASTOLIC BLOOD PRESSURE: 84 MMHG | SYSTOLIC BLOOD PRESSURE: 144 MMHG | OXYGEN SATURATION: 99 % | HEART RATE: 77 BPM | BODY MASS INDEX: 34.66 KG/M2

## 2022-02-01 DIAGNOSIS — Z12.11 SCREEN FOR COLON CANCER: ICD-10-CM

## 2022-02-01 DIAGNOSIS — Z00.00 ENCOUNTER FOR MEDICARE ANNUAL WELLNESS EXAM: Primary | ICD-10-CM

## 2022-02-01 DIAGNOSIS — Z12.31 ENCOUNTER FOR SCREENING MAMMOGRAM FOR BREAST CANCER: ICD-10-CM

## 2022-02-01 DIAGNOSIS — I10 HYPERTENSION GOAL BP (BLOOD PRESSURE) < 130/80: ICD-10-CM

## 2022-02-01 DIAGNOSIS — E11.9 TYPE 2 DIABETES MELLITUS WITHOUT COMPLICATION, WITHOUT LONG-TERM CURRENT USE OF INSULIN (H): ICD-10-CM

## 2022-02-01 DIAGNOSIS — Z13.29 SCREENING FOR THYROID DISORDER: ICD-10-CM

## 2022-02-01 DIAGNOSIS — Z13.0 SCREENING FOR DEFICIENCY ANEMIA: ICD-10-CM

## 2022-02-01 DIAGNOSIS — E78.5 HYPERLIPIDEMIA LDL GOAL <100: ICD-10-CM

## 2022-02-01 DIAGNOSIS — Z13.220 SCREENING FOR HYPERLIPIDEMIA: ICD-10-CM

## 2022-02-01 LAB
ALBUMIN SERPL-MCNC: 4 G/DL (ref 3.4–5)
ALP SERPL-CCNC: 55 U/L (ref 40–150)
ALT SERPL W P-5'-P-CCNC: 25 U/L (ref 0–50)
ANION GAP SERPL CALCULATED.3IONS-SCNC: 6 MMOL/L (ref 3–14)
AST SERPL W P-5'-P-CCNC: 13 U/L (ref 0–45)
BILIRUB SERPL-MCNC: 0.5 MG/DL (ref 0.2–1.3)
BUN SERPL-MCNC: 12 MG/DL (ref 7–30)
CALCIUM SERPL-MCNC: 9.6 MG/DL (ref 8.5–10.1)
CHLORIDE BLD-SCNC: 97 MMOL/L (ref 94–109)
CHOLEST SERPL-MCNC: 129 MG/DL
CO2 SERPL-SCNC: 30 MMOL/L (ref 20–32)
CREAT SERPL-MCNC: 0.65 MG/DL (ref 0.52–1.04)
CREAT UR-MCNC: 91 MG/DL
ERYTHROCYTE [DISTWIDTH] IN BLOOD BY AUTOMATED COUNT: 12.4 % (ref 10–15)
FASTING STATUS PATIENT QL REPORTED: YES
GFR SERPL CREATININE-BSD FRML MDRD: >90 ML/MIN/1.73M2
GLUCOSE BLD-MCNC: 94 MG/DL (ref 70–99)
HBA1C MFR BLD: 6.3 % (ref 0–5.6)
HCT VFR BLD AUTO: 37.5 % (ref 35–47)
HDLC SERPL-MCNC: 60 MG/DL
HGB BLD-MCNC: 12.5 G/DL (ref 11.7–15.7)
LDLC SERPL CALC-MCNC: 52 MG/DL
MCH RBC QN AUTO: 29.3 PG (ref 26.5–33)
MCHC RBC AUTO-ENTMCNC: 33.3 G/DL (ref 31.5–36.5)
MCV RBC AUTO: 88 FL (ref 78–100)
MICROALBUMIN UR-MCNC: <5 MG/L
MICROALBUMIN/CREAT UR: NORMAL MG/G{CREAT}
NONHDLC SERPL-MCNC: 69 MG/DL
PLATELET # BLD AUTO: 276 10E3/UL (ref 150–450)
POTASSIUM BLD-SCNC: 3.5 MMOL/L (ref 3.4–5.3)
PROT SERPL-MCNC: 7.5 G/DL (ref 6.8–8.8)
RBC # BLD AUTO: 4.26 10E6/UL (ref 3.8–5.2)
SODIUM SERPL-SCNC: 133 MMOL/L (ref 133–144)
TRIGL SERPL-MCNC: 87 MG/DL
TSH SERPL DL<=0.005 MIU/L-ACNC: 1.73 MU/L (ref 0.4–4)
VIT B12 SERPL-MCNC: 386 PG/ML (ref 193–986)
WBC # BLD AUTO: 6.7 10E3/UL (ref 4–11)

## 2022-02-01 PROCEDURE — 99214 OFFICE O/P EST MOD 30 MIN: CPT | Mod: 25 | Performed by: NURSE PRACTITIONER

## 2022-02-01 PROCEDURE — 80061 LIPID PANEL: CPT | Performed by: NURSE PRACTITIONER

## 2022-02-01 PROCEDURE — 80053 COMPREHEN METABOLIC PANEL: CPT | Performed by: NURSE PRACTITIONER

## 2022-02-01 PROCEDURE — 99397 PER PM REEVAL EST PAT 65+ YR: CPT | Performed by: NURSE PRACTITIONER

## 2022-02-01 PROCEDURE — 84443 ASSAY THYROID STIM HORMONE: CPT | Performed by: NURSE PRACTITIONER

## 2022-02-01 PROCEDURE — 36415 COLL VENOUS BLD VENIPUNCTURE: CPT | Performed by: NURSE PRACTITIONER

## 2022-02-01 PROCEDURE — 82043 UR ALBUMIN QUANTITATIVE: CPT | Performed by: NURSE PRACTITIONER

## 2022-02-01 PROCEDURE — 99207 PR FOOT EXAM NO CHARGE: CPT | Mod: 25 | Performed by: NURSE PRACTITIONER

## 2022-02-01 PROCEDURE — 85027 COMPLETE CBC AUTOMATED: CPT | Performed by: NURSE PRACTITIONER

## 2022-02-01 PROCEDURE — 82607 VITAMIN B-12: CPT | Performed by: NURSE PRACTITIONER

## 2022-02-01 PROCEDURE — 83036 HEMOGLOBIN GLYCOSYLATED A1C: CPT | Performed by: NURSE PRACTITIONER

## 2022-02-01 RX ORDER — LISINOPRIL AND HYDROCHLOROTHIAZIDE 12.5; 2 MG/1; MG/1
2 TABLET ORAL DAILY
Qty: 180 TABLET | Refills: 0 | Status: SHIPPED | OUTPATIENT
Start: 2022-02-01 | End: 2022-08-01

## 2022-02-01 RX ORDER — ATORVASTATIN CALCIUM 40 MG/1
40 TABLET, FILM COATED ORAL DAILY
Qty: 90 TABLET | Refills: 3 | Status: SHIPPED | OUTPATIENT
Start: 2022-02-01 | End: 2023-01-20

## 2022-02-01 RX ORDER — METFORMIN HCL 500 MG
2000 TABLET, EXTENDED RELEASE 24 HR ORAL
Qty: 360 TABLET | Refills: 3 | Status: SHIPPED | OUTPATIENT
Start: 2022-02-01 | End: 2023-01-25

## 2022-02-01 RX ORDER — AMLODIPINE BESYLATE 2.5 MG/1
2.5 TABLET ORAL DAILY
Qty: 90 TABLET | Refills: 3 | Status: SHIPPED | OUTPATIENT
Start: 2022-02-01 | End: 2023-01-23

## 2022-02-01 ASSESSMENT — ENCOUNTER SYMPTOMS
CHILLS: 0
EYE PAIN: 0
DIZZINESS: 0
HEADACHES: 0
NERVOUS/ANXIOUS: 0
MYALGIAS: 0
PARESTHESIAS: 0
HEARTBURN: 0
SORE THROAT: 0
PALPITATIONS: 0
DIARRHEA: 0
DYSURIA: 0
ARTHRALGIAS: 0
JOINT SWELLING: 0
CONSTIPATION: 0
WEAKNESS: 0
BREAST MASS: 0
HEMATURIA: 0
SHORTNESS OF BREATH: 0
ABDOMINAL PAIN: 0
FREQUENCY: 0
HEMATOCHEZIA: 0
COUGH: 0
NAUSEA: 0
FEVER: 0

## 2022-02-01 ASSESSMENT — ACTIVITIES OF DAILY LIVING (ADL): CURRENT_FUNCTION: NO ASSISTANCE NEEDED

## 2022-02-01 ASSESSMENT — MIFFLIN-ST. JEOR: SCORE: 1440.8

## 2022-02-01 NOTE — PATIENT INSTRUCTIONS
Patient Education   Personalized Prevention Plan  You are due for the preventive services outlined below.  Your care team is available to assist you in scheduling these services.  If you have already completed any of these items, please share that information with your care team to update in your medical record.  Health Maintenance Due   Topic Date Due     Diptheria Tetanus Pertussis (DTAP/TDAP/TD) Vaccine (1 - Tdap) Never done     Zoster (Shingles) Vaccine (1 of 2) Never done     Pneumococcal Vaccine (1 of 1 - PPSV23) Never done     Colorectal Cancer Screening  09/17/2021     Diabetic Foot Exam  11/02/2021     Annual Wellness Visit  12/21/2021     Basic Metabolic Panel  12/21/2021     Cholesterol Lab  12/21/2021     Kidney Microalbumin Urine Test  12/21/2021     ANNUAL REVIEW OF HM ORDERS  12/21/2021     FALL RISK ASSESSMENT  12/21/2021

## 2022-02-01 NOTE — LETTER
[facility]  [address] [place] [address]  [phone]                    February 7, 2022    Janelle Bhat  [address] [address]      Dear Janelle Dickinson,    Here is a summary of your recent test results: we were unable to get a hold of you on your mychart.       All Your labs are amazing and show good control of your diabetes. Lets keep everything the same. Good work.   A1C (test of diabetes control the last 2-3 months) is at your goal.       For additional lab test information, [email or web address] is an excellent reference.     Your test results are enclosed.      Please contact me if you have any questions.    In addition, here is a list of due or overdue Health Maintenance reminders.    Health Maintenance Due   Topic Date Due     Colorectal Cancer Screening  09/17/2021     Please call us at [phone] (or use [employer]) to address the above recommendations.            Thank you very much for trusting [facility].     Healthy regards,      Georgina Caputo, [facility]       Results for orders placed or performed in visit on 02/01/22   Albumin Random Urine Quantitative with Creat Ratio     Status: None   Result Value Ref Range    Creatinine Urine mg/dL 91 mg/dL    Albumin Urine mg/L <5 mg/L    Albumin Urine mg/g Cr     Lipid panel reflex to direct LDL Fasting     Status: None   Result Value Ref Range    Cholesterol 129 <200 mg/dL    Triglycerides 87 <150 mg/dL    Direct Measure HDL 60 >=50 mg/dL    LDL Cholesterol Calculated 52 <=100 mg/dL    Non HDL Cholesterol 69 <130 mg/dL    Patient Fasting > 8hrs? Yes     Narrative    Cholesterol  Desirable:  <200 mg/dL    Triglycerides  Normal:  Less than 150 mg/dL  Borderline High:  150-199 mg/dL  High:  200-499 mg/dL  Very High:  Greater than or equal to 500 mg/dL    Direct Measure HDL  Female:  Greater than or equal to 50 mg/dL   Male:  Greater than or equal to 40 mg/dL    LDL Cholesterol  Desirable:   <100mg/dL  Above Desirable:  100-129 mg/dL   Borderline High:  130-159 mg/dL   High:  160-189 mg/dL   Very High:  >= 190 mg/dL    Non HDL Cholesterol  Desirable:  130 mg/dL  Above Desirable:  130-159 mg/dL  Borderline High:  160-189 mg/dL  High:  190-219 mg/dL  Very High:  Greater than or equal to 220 mg/dL   Comprehensive metabolic panel (BMP + Alb, Alk Phos, ALT, AST, Total. Bili, TP)     Status: Normal   Result Value Ref Range    Sodium 133 133 - 144 mmol/L    Potassium 3.5 3.4 - 5.3 mmol/L    Chloride 97 94 - 109 mmol/L    Carbon Dioxide (CO2) 30 20 - 32 mmol/L    Anion Gap 6 3 - 14 mmol/L    Urea Nitrogen 12 7 - 30 mg/dL    Creatinine 0.65 0.52 - 1.04 mg/dL    Calcium 9.6 8.5 - 10.1 mg/dL    Glucose 94 70 - 99 mg/dL    Alkaline Phosphatase 55 40 - 150 U/L    AST 13 0 - 45 U/L    ALT 25 0 - 50 U/L    Protein Total 7.5 6.8 - 8.8 g/dL    Albumin 4.0 3.4 - 5.0 g/dL    Bilirubin Total 0.5 0.2 - 1.3 mg/dL    GFR Estimate >90 >60 mL/min/1.73m2   Vitamin B12     Status: Normal   Result Value Ref Range    Vitamin B12 386 193 - 986 pg/mL   Hemoglobin A1c     Status: Abnormal   Result Value Ref Range    Hemoglobin A1C 6.3 (H) 0.0 - 5.6 %   TSH with free T4 reflex     Status: Normal   Result Value Ref Range    TSH 1.73 0.40 - 4.00 mU/L   CBC with platelets     Status: Normal   Result Value Ref Range    WBC Count 6.7 4.0 - 11.0 10e3/uL    RBC Count 4.26 3.80 - 5.20 10e6/uL    Hemoglobin 12.5 11.7 - 15.7 g/dL    Hematocrit 37.5 35.0 - 47.0 %    MCV 88 78 - 100 fL    MCH 29.3 26.5 - 33.0 pg    MCHC 33.3 31.5 - 36.5 g/dL    RDW 12.4 10.0 - 15.0 %    Platelet Count 276 150 - 450 10e3/uL

## 2022-02-01 NOTE — PROGRESS NOTES
"SUBJECTIVE:   Janelle Bhat is a 66 year old female who presents for Preventive Visit.    Patient has been advised of split billing requirements and indicates understanding: Yes  Are you in the first 12 months of your Medicare coverage?  No    Healthy Habits:     In general, how would you rate your overall health?  Good    Do you usually eat at least 4 servings of fruit and vegetables a day, include whole grains    & fiber and avoid regularly eating high fat or \"junk\" foods?  Yes    Taking medications regularly:  Yes    Medication side effects:  None    Ability to successfully perform activities of daily living:  No assistance needed    Home Safety:  No safety concerns identified    Hearing Impairment:  No hearing concerns    In the past 6 months, have you been bothered by leaking of urine?  No    In general, how would you rate your overall mental or emotional health?  Good      PHQ-2 Total Score: 0    Do you feel safe in your environment? Yes    Have you ever done Advance Care Planning? (For example, a Health Directive, POLST, or a discussion with a medical provider or your loved ones about your wishes): Yes, patient states has an Advance Care Planning document and will bring a copy to the clinic.    Fall risk  Fallen 2 or more times in the past year?: No  Any fall with injury in the past year?: No    Cognitive Screening   1) Repeat 3 items (Leader, Season, Table)    2) Clock draw: NORMAL  3) 3 item recall: Recalls 3 objects  Results: 3 items recalled: COGNITIVE IMPAIRMENT LESS LIKELY    Mini-CogTM Copyright ARISTEO Hernandez. Licensed by the author for use in [facility]; reprinted with permission (jennifer@.[facility]). All rights reserved.      Reviewed and updated as needed this visit by clinical staff  Tobacco  Allergies  Meds  Problems  Med Hx  Surg Hx  Fam Hx  Soc Hx       Reviewed and updated as needed this visit by Provider  Tobacco  Allergies  Meds  Problems  Med Hx  Surg Hx  Fam Hx      "   Social History     Tobacco Use     Smoking status: Never Smoker     Smokeless tobacco: Never Used   Substance Use Topics     Alcohol use: Yes     Comment: 0-2 Q1M     If you drink alcohol do you typically have >3 drinks per day or >7 drinks per week? No    Alcohol Use 2/1/2022   Prescreen: >3 drinks/day or >7 drinks/week? -   Prescreen: >3 drinks/day or >7 drinks/week? No     Diabetes Follow-up    How often are you checking your blood sugar? Two times daily - max  Blood sugar testing frequency justification:  monitoring - has been good - feels like wasting test strips  What time of day are you checking your blood sugars (select all that apply)?  Before and after meals  Have you had any blood sugars above 200?  No  Have you had any blood sugars below 70?  No    What symptoms do you notice when your blood sugar is low?  None    What concerns do you have today about your diabetes? None     Do you have any of these symptoms? (Select all that apply)  No numbness or tingling in feet.  No redness, sores or blisters on feet.  No complaints of excessive thirst.  No reports of blurry vision.  No significant changes to weight.     Has good control of diabetes feels she is waisting strips.    Previous records in [place] unsure when she had her last tetanus or pneumonia vaccination.  Also reports she did the shingles vaccine series when it was 1 shot.  Records have been sent need to review those records.    Feels like colonoscopy has been done twice in her lifetime although she is unclear about dates.  Has been doing FIT for some years.  Its have been within normal limits.  She would like to stop testing altogether if able.  She is not opposed to repeating colonoscopy at this time.    BP Readings from Last 2 Encounters:   02/01/22 (!) 144/84   12/21/20 128/81     Hemoglobin A1C POCT (%)   Date Value   12/21/2020 6.3 (H)   06/18/2020 6.4 (H)     Hemoglobin A1C (%)   Date Value   02/01/2022 6.3 (H)   08/17/2021 6.3 (H)      LDL Cholesterol Calculated (mg/dL)   Date Value   12/21/2020 46   10/25/2019 56     Hyperlipidemia Follow-Up      Are you regularly taking any medication or supplement to lower your cholesterol?   Yes- Atorvastatin 40mg    Are you having muscle aches or other side effects that you think could be caused by your cholesterol lowering medication?  No    Hypertension Follow-up      Do you check your blood pressure regularly outside of the clinic? No  - never been told she needs too    Are you following a low salt diet? Was told was low sodium here at one point - does not add much salt -     Are your blood pressures ever more than 140 on the top number (systolic) OR more   than 90 on the bottom number (diastolic), for example 140/90? N/a    Current providers sharing in care for this patient include:     Patient Care Team:  Georgina Caputo APRN CNP as PCP - General (Nurse Practitioner)  Georgina Caputo APRN CNP as Assigned PCP  Bryson Ray DPM as Assigned Musculoskeletal Provider  Tonie Matthews OD as Assigned Surgical Provider    The following health maintenance items are reviewed in Epic and correct as of today:  Health Maintenance Due   Topic Date Due     VITAMIN B12  Never done     COLORECTAL CANCER SCREENING  09/17/2021     CMP  12/21/2021     LIPID  12/21/2021     MICROALBUMIN  12/21/2021     TSH W/FREE T4 REFLEX  12/21/2021     FALL RISK ASSESSMENT  12/21/2021     Lab work is in process  Labs reviewed in EPIC  BP Readings from Last 3 Encounters:   02/01/22 (!) 144/84   12/21/20 128/81   12/04/20 (!) 140/80    Wt Readings from Last 3 Encounters:   02/01/22 92.1 kg (203 lb)   12/21/20 90.7 kg (200 lb)   12/04/20 90.3 kg (199 lb)         There is no problem list on file for this patient.    Past Surgical History:   Procedure Laterality Date     COLONOSCOPY      2-3 times     GYN SURGERY  in [place]    ovaries removed       Social History     Tobacco Use     Smoking status: Never  Smoker     Smokeless tobacco: Never Used   Substance Use Topics     Alcohol use: Yes     Comment: 0-2 Q1M     Family History   Problem Relation Age of Onset     Diabetes Mother         type 2     Macular Degeneration Mother      Other Cancer Father         lung cancer +     Hypertension Sister      Skin Cancer Sister      Coronary Artery Disease Maternal Grandfather         brain aneurysm     Cerebrovascular Disease Maternal Grandmother      Other Cancer Paternal Grandmother         lung cancer         Current Outpatient Medications   Medication Sig Dispense Refill     amLODIPine (NORVASC) 2.5 MG tablet Take 1 tablet (2.5 mg) by mouth daily 90 tablet 3     atorvastatin (LIPITOR) 40 MG tablet Take 1 tablet (40 mg) by mouth daily 90 tablet 3     blood glucose (NO BRAND SPECIFIED) test strip Use to test blood sugar 1-3 times weekly or as directed. 100 strip 1     lisinopril-hydrochlorothiazide (ZESTORETIC) 20-12.5 MG tablet Take 2 tablets by mouth daily 180 tablet 0     metFORMIN (GLUCOPHAGE-XR) 500 MG 24 hr tablet Take 4 tablets (2,000 mg) by mouth daily (with dinner) 4 tablets daily with dinner 360 tablet 3     vitamin C (ASCORBIC ACID) 1000 MG TABS Take 1,000 mg by mouth daily       vitamin D3 (CHOLECALCIFEROL) 50 mcg (2000 units) tablet Take 1 tablet by mouth daily       No Known Allergies  Pneumonia Vaccine: need to review reocords  Mammogram Screening: Mammogram Screening: Recommended mammography every 1 years with patient discussion and risk factor consideration    Breast CA Risk Assessment (FHS-7) 1/29/2022   Do you have a family history of breast, colon, or ovarian cancer? No / Unknown     Mammogram Screening: Recommended mammography every 1-2 years with patient discussion and risk factor consideration  Pertinent mammograms are reviewed under the imaging tab.    Review of Systems   Constitutional: Negative for chills and fever.   HENT: Negative for congestion, ear pain, hearing loss and sore throat.    Eyes:  "Negative for pain and visual disturbance.   Respiratory: Negative for cough and shortness of breath.    Cardiovascular: Negative for chest pain, palpitations and peripheral edema.   Gastrointestinal: Negative for abdominal pain, constipation, diarrhea, heartburn, hematochezia and nausea.   Breasts:  Negative for tenderness, breast mass and discharge.   Genitourinary: Negative for dysuria, frequency, genital sores, hematuria, pelvic pain, urgency, vaginal bleeding and vaginal discharge.   Musculoskeletal: Negative for arthralgias, joint swelling and myalgias.   Skin: Negative for rash.   Neurological: Negative for dizziness, weakness, headaches and paresthesias.   Psychiatric/Behavioral: Negative for mood changes. The patient is not nervous/anxious.      OBJECTIVE:   BP (!) 144/84   Pulse 77   Temp 97.6  F (36.4  C) (Tympanic)   Ht 1.626 m (5' 4\")   Wt 92.1 kg (203 lb)   SpO2 99%   Breastfeeding No   BMI 34.84 kg/m   Estimated body mass index is 34.84 kg/m  as calculated from the following:    Height as of this encounter: 1.626 m (5' 4\").    Weight as of this encounter: 92.1 kg (203 lb).  Physical Exam  GENERAL: healthy, alert and no distress  EYES: Eyes grossly normal to inspection, PERRL and conjunctivae and sclerae normal  HENT: ear canals and TM's normal, nose and mouth without ulcers or lesions  NECK: no adenopathy, no asymmetry, masses, or scars and thyroid normal to palpation  RESP: lungs clear to auscultation - no rales, rhonchi or wheezes  BREAST:large dense breasts without masses, tenderness or nipple discharge and no palpable axillary masses or adenopathy  CV: regular rate and rhythm, normal S1 S2, no S3 or S4, no murmur, click or rub, no peripheral edema and peripheral pulses strong  ABDOMEN: soft, nontender, no hepatosplenomegaly, no masses and bowel sounds normal  MS: no gross musculoskeletal defects noted, no edema  SKIN: no suspicious lesions or rashes  NEURO: Normal strength and tone, " mentation intact and speech normal  PSYCH: mentation appears normal, affect normal/bright    Diagnostic Test Results:  Labs reviewed in Epic    ASSESSMENT / PLAN:   Janelle Dickinson was seen today for physical.    Diagnoses and all orders for this visit:    Encounter for Medicare annual wellness exam  Well woman exam with breast exam completed today.    Fasting labs today.    Will notify of lab results.  -     REVIEW OF HEALTH MAINTENANCE PROTOCOL ORDERS    Type 2 diabetes mellitus without complication, without long-term current use of insulin (H)  12579  Okay to just test once weekly.  No concerns.  Stable.  Continue same medication this was refilled today.  -     Albumin Random Urine Quantitative with Creat Ratio  -     metFORMIN (GLUCOPHAGE-XR) 500 MG 24 hr tablet; Take 4 tablets (2,000 mg) by mouth daily (with dinner) 4 tablets daily with dinner  -     FOOT EXAM  -     Comprehensive metabolic panel (BMP + Alb, Alk Phos, ALT, AST, Total. Bili, TP); Future  -     Vitamin B12; Future  -     Hemoglobin A1c; Future  -     blood glucose (NO BRAND SPECIFIED) test strip; Use to test blood sugar 1-3 times weekly or as directed.  -     Comprehensive metabolic panel (BMP + Alb, Alk Phos, ALT, AST, Total. Bili, TP)  -     Vitamin B12  -     Hemoglobin A1c    Hypertension goal BP (blood pressure) < 130/80  96686  No concerns.  Stable.  Continue same medication this was refilled today.  -     Albumin Random Urine Quantitative with Creat Ratio  -     lisinopril-hydrochlorothiazide (ZESTORETIC) 20-12.5 MG tablet; Take 2 tablets by mouth daily  -     amLODIPine (NORVASC) 2.5 MG tablet; Take 1 tablet (2.5 mg) by mouth daily  -     Comprehensive metabolic panel (BMP + Alb, Alk Phos, ALT, AST, Total. Bili, TP); Future  -     Comprehensive metabolic panel (BMP + Alb, Alk Phos, ALT, AST, Total. Bili, TP)    Hyperlipidemia LDL goal <100  74895  No concerns.  Stable.  Continue same medication this was refilled today.  -     atorvastatin  "(LIPITOR) 40 MG tablet; Take 1 tablet (40 mg) by mouth daily    Screen for colon cancer  -     Adult Gastro Ref - Procedure Only; Future    Screening for hyperlipidemia  -     Lipid panel reflex to direct LDL Fasting; Future  -     Lipid panel reflex to direct LDL Fasting    Screening for thyroid disorder  -     TSH with free T4 reflex; Future  -     TSH with free T4 reflex    Screening for deficiency anemia  -     CBC with platelets; Future  -     CBC with platelets    Encounter for screening mammogram for breast cancer  -     MA Screen Bilateral w/Joaquín; Future    Patient has been advised of split billing requirements and indicates understanding: Yes    COUNSELING:  Reviewed preventive health counseling, as reflected in patient instructions       Regular exercise       Healthy diet/nutrition    Estimated body mass index is 34.84 kg/m  as calculated from the following:    Height as of this encounter: 1.626 m (5' 4\").    Weight as of this encounter: 92.1 kg (203 lb).    Weight management plan: Discussed healthy diet and exercise guidelines    She reports that she has never smoked. She has never used smokeless tobacco.    Appropriate preventive services were discussed with this patient, including applicable screening as appropriate for cardiovascular disease, diabetes, osteopenia/osteoporosis, and glaucoma.  As appropriate for age/gender, discussed screening for colorectal cancer, prostate cancer, breast cancer, and cervical cancer. Checklist reviewing preventive services available has been given to the patient.    Reviewed patients plan of care and provided an AVS. The Basic Care Plan (routine screening as documented in Health Maintenance) for Janelle Dickinson meets the Care Plan requirement. This Care Plan has been established and reviewed with the Patient.    Counseling Resources:  ATP IV Guidelines  Pooled Cohorts Equation Calculator  Breast Cancer Risk Calculator  Breast Cancer: Medication to Reduce Risk  FRAX Risk " Assessment  ICSI Preventive Guidelines  Dietary Guidelines for Americans, 2010  USDA's MyPlate  ASA Prophylaxis  Lung CA Screening      YESSI Crews CNP  [facility]    Identified Health Risks:

## 2022-02-02 NOTE — RESULT ENCOUNTER NOTE
Dear Janelle Dickinson,    Here is a summary of your recent test results:    All Your labs are amazing and show good control of your diabetes. Lets keep everything the same. Good work.   A1C (test of diabetes control the last 2-3 months) is at your goal.    For additional lab test information, [email or web address].org is an excellent reference.    In addition, here is a list of due or overdue Health Maintenance reminders:    Colorectal Cancer Screening due on 09/17/2021  FALL RISK ASSESSMENT due on 12/21/2021    Please call us at [phone] (or use [employer]) to address the above recommendations if needed.    Thank you for choosing  [facility].  It was an honor and a privilege to participate in your care.       Healthy regards,    Georgina Caputo, ROSS  [facility]

## 2022-02-07 NOTE — RESULT ENCOUNTER NOTE
Note to Staff: please send a result letter     Dear Janelle Dickinson,     Here is a summary of your recent test results: we were unable to get a hold of you on your mychart.      All Your labs are amazing and show good control of your diabetes. Lets keep everything the same. Good work.   A1C (test of diabetes control the last 2-3 months) is at your goal.     For additional lab test information, [email or web address] is an excellent reference.     In addition, here is a list of due or overdue Health Maintenance reminders:     Colorectal Cancer Screening due on 09/17/2021  FALL RISK ASSESSMENT due on 12/21/2021     Please call us at [phone] (or use [employer]) to address the above recommendations if needed.     Thank you for choosing  [facility].  It was an honor and a privilege to participate in your care.         Healthy regards,     Georgina Caputo, ROSS  [facility]

## 2022-06-01 ENCOUNTER — TRANSFERRED RECORDS (OUTPATIENT)
Dept: HEALTH INFORMATION MANAGEMENT | Facility: CLINIC | Age: 67
End: 2022-06-01

## 2022-06-03 ENCOUNTER — HOSPITAL ENCOUNTER (OUTPATIENT)
Dept: MAMMOGRAPHY | Facility: CLINIC | Age: 67
Discharge: HOME | End: 2022-06-03
Attending: NURSE PRACTITIONER | Admitting: NURSE PRACTITIONER
Payer: COMMERCIAL

## 2022-06-03 DIAGNOSIS — Z12.31 ENCOUNTER FOR SCREENING MAMMOGRAM FOR BREAST CANCER: ICD-10-CM

## 2022-06-03 PROCEDURE — 77067 SCR MAMMO BI INCL CAD: CPT

## 2022-06-06 NOTE — RESULT ENCOUNTER NOTE
Dear Janelle Dickinson,    Here is a summary of your recent test results:    The breast center will be reaching out to you to do more imaging to take a closer look at your right breast. If you don't hear from them please let us know.     Findings: The breasts have scattered areas of fibroglandular density.  There are possible calcifications in the right breast at the 9 o'clock position, posterior depth.  The remainder of the breast tissue is unremarkable.  There is no suspicious finding of the left breast.     IMPRESSION: ACR BI-RADS Category 0: Need Additional Imaging Evaluation     RECOMMENDED FOLLOW-UP: Magnification views of the right breast.     The results and recommendations of this examination will be communicated to the patient and the imaging center will attempt to contact the patient within 2-3 business days to schedule follow-up imaging.      Please call us at [phone] (or use [employer]) to address the above recommendations if needed.    Thank you for choosing [facility].  It was an honor and a privilege to participate in your care.     Healthy regards,    Georgina Caputo, ROSS  [facility]

## 2022-06-09 ENCOUNTER — HOSPITAL ENCOUNTER (OUTPATIENT)
Dept: MAMMOGRAPHY | Facility: CLINIC | Age: 67
Discharge: HOME | End: 2022-06-09
Attending: NURSE PRACTITIONER | Admitting: NURSE PRACTITIONER
Payer: COMMERCIAL

## 2022-06-09 DIAGNOSIS — R92.8 ABNORMAL MAMMOGRAM: ICD-10-CM

## 2022-06-09 PROCEDURE — 77065 DX MAMMO INCL CAD UNI: CPT | Mod: RT

## 2022-06-09 NOTE — LETTER
Janelle Bhat  [address] [address]            June 9, 2022      Dear Janelle Dickinson:    Thank you for your recent visit.    Breast Imaging Result: Based on your recent breast imaging, you have a suspicious area that usually requires a biopsy, at which time a small tissue sample would be taken from your breast.      If you have already made these arrangements, please disregard this letter.    A report of your breast imaging results was sent to: Georgina Caputo    Your breast imaging will become part of your medical file here at [facility] for at least 10 years. You are responsible for informing any new health care team or breast imaging facility of the date and location of this examination.    We appreciate the opportunity to participate in your health care.    Sincerely,  Paulino Fonseca MD   [facility]

## 2022-06-16 ENCOUNTER — HOSPITAL ENCOUNTER (OUTPATIENT)
Dept: MAMMOGRAPHY | Facility: CLINIC | Age: 67
Discharge: HOME | End: 2022-06-16
Attending: NURSE PRACTITIONER
Payer: COMMERCIAL

## 2022-06-16 DIAGNOSIS — R92.8 ABNORMAL MAMMOGRAM: ICD-10-CM

## 2022-06-16 PROCEDURE — 19081 BX BREAST 1ST LESION STRTCTC: CPT | Mod: RT

## 2022-06-16 PROCEDURE — 88305 TISSUE EXAM BY PATHOLOGIST: CPT | Mod: 26 | Performed by: PATHOLOGY

## 2022-06-16 PROCEDURE — 999N000065 MA POST PROCEDURE RIGHT

## 2022-06-16 PROCEDURE — 88305 TISSUE EXAM BY PATHOLOGIST: CPT | Mod: TC | Performed by: NURSE PRACTITIONER

## 2022-06-16 PROCEDURE — 250N000009 HC RX 250: Performed by: NURSE PRACTITIONER

## 2022-06-16 RX ORDER — LIDOCAINE HYDROCHLORIDE 10 MG/ML
6 INJECTION, SOLUTION EPIDURAL; INFILTRATION; INTRACAUDAL; PERINEURAL ONCE
Status: COMPLETED | OUTPATIENT
Start: 2022-06-16 | End: 2022-06-16

## 2022-06-16 RX ORDER — LIDOCAINE HYDROCHLORIDE AND EPINEPHRINE 10; 10 MG/ML; UG/ML
12 INJECTION, SOLUTION INFILTRATION; PERINEURAL ONCE
Status: COMPLETED | OUTPATIENT
Start: 2022-06-16 | End: 2022-06-16

## 2022-06-16 RX ADMIN — LIDOCAINE HYDROCHLORIDE AND EPINEPHRINE 12 ML: 10; 10 INJECTION, SOLUTION INFILTRATION; PERINEURAL at 10:06

## 2022-06-16 RX ADMIN — LIDOCAINE HYDROCHLORIDE 6 ML: 10 INJECTION, SOLUTION EPIDURAL; INFILTRATION; INTRACAUDAL; PERINEURAL at 10:06

## 2022-06-16 NOTE — RESULT ENCOUNTER NOTE
Biopsy done. Results pending.     Georgina Caputo, ROSS  [facility]

## 2022-06-16 NOTE — DISCHARGE INSTRUCTIONS

## 2022-06-17 LAB
PATH REPORT.COMMENTS IMP SPEC: NORMAL
PATH REPORT.FINAL DX SPEC: NORMAL
PATH REPORT.GROSS SPEC: NORMAL
PATH REPORT.MICROSCOPIC SPEC OTHER STN: NORMAL
PATH REPORT.RELEVANT HX SPEC: NORMAL
PHOTO IMAGE: NORMAL

## 2022-06-20 ENCOUNTER — TELEPHONE (OUTPATIENT)
Dept: MAMMOGRAPHY | Facility: CLINIC | Age: 67
End: 2022-06-20
Payer: COMMERCIAL

## 2022-06-20 NOTE — TELEPHONE ENCOUNTER
Pathology report reviewed with our breast radiologist Dr Burnett, who confirmed the recent breast imaging is concordant with the final pathology results below.    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  - Benign fibrofatty breast tissue with focal columnar cell change and minute intraductal papilloma with calcifications also present.  - Negative for atypia and malignancy.     Recommended follow up is surgical consult.  This has been arranged for patient. She will meet with Dr Miles on 6-27-22 at 1pm.    Patient states no problems or concerns with her biopsy site.   Questions were answered and my phone number given if she has further questions or concerns.  I informed patient I will notify the ordering provider of the results and recommendations for follow up.  Patient verbalized understanding and agrees with the plan of care.     Vera Strickland RN CBCN  Breast Care Nurse Coordinator  [facility]  [phone]  
[id number] Comprehensive

## 2022-06-23 NOTE — RESULT ENCOUNTER NOTE
Patient aware.  Pathology Results:  Fibroadenoma with associated microcalcifications as well as minute  intraductal papilloma with calcifications.  Please see full pathology report for further details.   Results are concordant with imaging findings.   Recommendation: Surgical consultation.  Georgina Caputo TITO  [facility]

## 2022-06-23 NOTE — RESULT ENCOUNTER NOTE
Patient aware.  Pathology Results:  Fibroadenoma with associated microcalcifications as well as minute  intraductal papilloma with calcifications.  Please see full pathology report for further details.   Results are concordant with imaging findings.   Recommendation: Surgical consultation.    Georgina Caputo TITO  [facility]

## 2022-06-27 ENCOUNTER — OFFICE VISIT (OUTPATIENT)
Dept: SURGERY | Facility: CLINIC | Age: 67
End: 2022-06-27
Payer: COMMERCIAL

## 2022-06-27 ENCOUNTER — TELEPHONE (OUTPATIENT)
Dept: SURGERY | Facility: CLINIC | Age: 67
End: 2022-06-27

## 2022-06-27 VITALS
BODY MASS INDEX: 34.66 KG/M2 | DIASTOLIC BLOOD PRESSURE: 78 MMHG | OXYGEN SATURATION: 97 % | WEIGHT: 203 LBS | SYSTOLIC BLOOD PRESSURE: 148 MMHG | RESPIRATION RATE: 16 BRPM | HEIGHT: 64 IN | HEART RATE: 75 BPM

## 2022-06-27 DIAGNOSIS — D24.1 INTRADUCTAL PAPILLOMA OF RIGHT BREAST: Primary | ICD-10-CM

## 2022-06-27 PROCEDURE — 99203 OFFICE O/P NEW LOW 30 MIN: CPT | Performed by: SURGERY

## 2022-06-27 NOTE — TELEPHONE ENCOUNTER
Type of surgery: RIGHT BREAST EXCISIONAL BIOPSY WITH LOCALIZATION  Location of surgery: [facility]  Date and time of surgery: 7-14-22, 8:45 AM   Surgeon: DR. GRIFFITH   Pre-Op Appt Date: PATIENT TO SCHEDULE   Post-Op Appt Date: PATIENT TO SCHEDULE    Packet sent out: GIVEN TO PATIENT   Pre-cert/Authorization completed:  Not Applicable  Date: 6-27-22        RIGHT BREAST EXCISIONAL BIOPSY WITH LOCALIZATION    GENERAL   PT INST TO HAVE H&P   45 MINS REQ  PA ASSIST DFB  ALW

## 2022-06-27 NOTE — PROGRESS NOTES
The patient presents today regarding recently discovered calcifications in the right breast.  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  There was also a tiny papilloma.  Surgical consultation was therefore recommended.    Past Medical History:   Diagnosis Date     Diabetes (H)      Past Surgical History:   Procedure Laterality Date     COLONOSCOPY      2-3 times     GYN SURGERY  in [place]    ovaries removed     Physical exam:  The patient is in no apparent distress.  Breathing is nonlabored.  Exam of the breast bilaterally reveal no palpable masses.  There are no palpable axillary or supraclavicular nodes.    Mammogram is reviewed with the patient.  This reveals a marking clip at the site of the biopsy.    Assessment and plan: I have recommended excisional biopsy in order to rule out adjacent malignancy or atypia.  We discussed the procedure, along with its risks and complications, in detail.  The patient has agreed to proceed.  We will work on getting this arranged for her with RFID localization in the near future.    A total of 30 minutes was spent today in chart review, patient examination and discussion, and documentation.    Reginaldo Miles MD  [facility]    Please route or send letter to:  Primary Care Provider (PCP)

## 2022-06-27 NOTE — LETTER
2022    RE: Janelle Bhat, : 1955      The patient presents today regarding recently discovered calcifications in the right breast.  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  There was also a tiny papilloma.  Surgical consultation was therefore recommended.     Physical exam:  The patient is in no apparent distress.  Breathing is nonlabored.  Exam of the breast bilaterally reveal no palpable masses.  There are no palpable axillary or supraclavicular nodes.     Mammogram is reviewed with the patient.  This reveals a marking clip at the site of the biopsy.     Assessment and plan: I have recommended excisional biopsy in order to rule out adjacent malignancy or atypia.  We discussed the procedure, along with its risks and complications, in detail.  The patient has agreed to proceed.  We will work on getting this arranged for her with RFID localization in the near future.       Reginaldo Miles MD  [facility]

## 2022-06-27 NOTE — LETTER
[facility]    [address], [address]  [place], [place] [address]  Phone [phone]  Fax [phone]    [address], [address]  [place] [address]  Phone [phone]  Fax [phone]    [email or web address]   2022       Janelle Bhat    RE: [id number]  : 1955    Janelle Bhat has been scheduled for surgery on 22 at 8:45 AM at [facility].  The hospital is located at [address] in [place].      Please check in at the Surgery reception desk at 6:45 AM. This is located in the back of the \[facility]\"" on the East side, just past the Emergency Room entrance.       DO NOT EAT OR DRINK ANYTHING AFTER MIDNIGHT THE NIGHT BEFORE YOUR  SURGERY.   You may have sips of clear liquids up until 2 hours before your surgery.   If you have been advised to take your medication, please do this early in the morning with just sips of clear liquid.      Hospital regulations require an updated pre-operative examination to be completed within 30 days of the procedure. This can be done by your primary care provider. Please ask them to fax documentation to [phone]. We also recommend you bring a copy with you.       During the current pandemic, a COVID-19 at home rapid antigen test is required   1-2 days before your procedure per hospital regulations. You can buy these at many pharmacy stores. Or you can order free, at-home tests at covid.gov/tests  ? If your test is negative, please take a photo of your test. Show the photo to the nurse when you come in for your procedure.  ? If your test is positive, please call our office at [phone].      You should shower before your surgery with Hibiclens or Exidine soap.  This can be found at your local pharmacy or you can pick it up from our office for free.  Please call our office if you have any questions.       You will be required to have an  Adult (friend or family member) drive you home after your surgery and arrange for an adult to stay with you until the next morning.       You will receive several calls from our staff 3-7 days prior to your scheduled procedure with further details and to answer any questions you may have.      It is sometimes necessary to adjust the surgery schedule due to emergencies and additions to the schedule.  If your surgery is affected by this, we greatly appreciate your flexibility and understanding in this matter      It is best if you call regarding post-operative questions between the hours of 8:00 am & 3:00 pm Monday-Friday, so you have access to the daytime care team that know you best.  ? Prescription refills are accepted during regular office hours only.      Please do not bring and Disability or FMLA papers to the hospital.  They need to be either faxed ([phone]), mailed or hand delivered to our office by you or a family member for completion.  ? Please allow 14 business days to complete paperwork.        If you have questions or concerns, please contact our office at [phone].

## 2022-06-27 NOTE — PROGRESS NOTES
Breast Patients      BREAST PATIENTS (FEMALE)    At what age did your periods begin? Unsure 5th or 6th grade    What was the date of your last menstrual period? Had ovaries removed about 2015    Have you begun menopause? Yes  Age Menopause began:  Had ovaries removed around 59    Are you using hormone replacement therapy?  No    Number of full-term pregnancies: 0    Did you nurse your children? No    Are you pregnant now? No    Do you have breast implants? No         BREAST PATIENTS (ALL)    Have you had a previous breast biopsy? Yes  Side: Right  Date: Before 2013    Have you had previous Breast Cancer? No

## 2022-06-27 NOTE — LETTER
[facility]    [address], [address]  [place], [place] [address]  Phone [phone]  Fax [phone]    [address], [address]  [facility] [place] [address]  Phone [phone]  Fax [phone]    [email or web address]             Janelle Bhat    You have been scheduled for:    RIGHT BREAST RADIOFREQUENCY TAG LOCALIZATION     Date: 7-13-22  Time: 8:30 AM    Location: [facility]   [address] [facility] [address] [facility]  [address]  [place]  [address]            Please check in at  8:15 AM

## 2022-07-11 ENCOUNTER — OFFICE VISIT (OUTPATIENT)
Dept: FAMILY MEDICINE | Facility: CLINIC | Age: 67
End: 2022-07-11
Payer: COMMERCIAL

## 2022-07-11 VITALS
WEIGHT: 201 LBS | HEART RATE: 86 BPM | HEIGHT: 64 IN | DIASTOLIC BLOOD PRESSURE: 84 MMHG | BODY MASS INDEX: 34.31 KG/M2 | SYSTOLIC BLOOD PRESSURE: 136 MMHG | TEMPERATURE: 99 F | OXYGEN SATURATION: 97 %

## 2022-07-11 DIAGNOSIS — R92.8 ABNORMAL MAMMOGRAM: ICD-10-CM

## 2022-07-11 DIAGNOSIS — E11.9 TYPE 2 DIABETES MELLITUS WITHOUT COMPLICATION, WITHOUT LONG-TERM CURRENT USE OF INSULIN (H): ICD-10-CM

## 2022-07-11 DIAGNOSIS — E78.5 HYPERLIPIDEMIA LDL GOAL <100: ICD-10-CM

## 2022-07-11 DIAGNOSIS — I10 HYPERTENSION GOAL BP (BLOOD PRESSURE) < 130/80: ICD-10-CM

## 2022-07-11 DIAGNOSIS — Z01.818 PREOP GENERAL PHYSICAL EXAM: Primary | ICD-10-CM

## 2022-07-11 PROCEDURE — 93000 ELECTROCARDIOGRAM COMPLETE: CPT | Performed by: NURSE PRACTITIONER

## 2022-07-11 PROCEDURE — 99214 OFFICE O/P EST MOD 30 MIN: CPT | Performed by: NURSE PRACTITIONER

## 2022-07-11 NOTE — H&P (VIEW-ONLY)
[address] [address]  Phone: [phone]  Primary Provider: Georgina Caputo  Pre-op Performing Provider: GEORGINA CAPUTO    PREOPERATIVE EVALUATION:  Today's date: 7/11/2022    Janelle Bhat is a 66 year old female who presents for a preoperative evaluation.    Surgical Information:  Surgery/Procedure: Right breast excisional biopsy with localization  Surgery Location: [facility]  Surgeon: Dr Reginaldo Miles  Surgery Date: 07/14/2022  Time of Surgery: 8:45a  Where patient plans to recover: At home with family  Fax number for surgical facility: Note does not need to be faxed, will be available electronically in Epic.    Type of Anesthesia Anticipated: General    Assessment & Plan     The proposed surgical procedure is considered INTERMEDIATE risk.    Preop general physical exam  Abnormal mammogram  Type 2 diabetes mellitus without complication, without long-term current use of insulin (H)  Hyperlipidemia LDL goal < 100  Hypertension goal BP (blood pressure) < 130/80  Cleared for surgery without concerns.  Medications discussed and instructions provided.  Janelle Dickinson verbalizes understanding of plan of care and is in agreement.   - EKG 12-lead complete w/read - Clinics    Risks and Recommendations:  The patient has the following additional risks and recommendations for perioperative complications:   - No identified additional risk factors other than previously addressed    Medication Instructions:   - ACE/ARB: decrease dose to one tablet of Lisinopril/HCTZ 20-12.5 the night before surgery.    - Calcium Channel Blockers: May be continued on the day of surgery.   - Statins: Continue taking takes the night before  surgery.    - metformin: HOLD night before surgery.  Encourage no NSAIDS, aspirin or vitamin supplementation for the next few days.  Tylenol is okay.    RECOMMENDATION:  APPROVAL GIVEN to proceed with  proposed procedure, without further diagnostic evaluation.    Subjective     HPI related to upcoming procedure: Abnormal mammogram on 6-3-2022.  Additional imaging revealed new indeterminate calcifications clustered at the 9 o'clock position of the right breast stereotactic biopsy completed.  Pathology: fibroadenoma with several foci of intraductal micro calcifications and benign fibrofatty tissue with focal columnar cell change and minute intraductal papilloma with calcifications also present  Surgeons plan: recommended excisional biopsy in order to rule out adjacent malignancy or atypia.      Preop Questions 7/6/2022   1. Have you ever had a heart attack or stroke? No   2. Have you ever had surgery on your heart or blood vessels, such as a stent placement, a coronary artery bypass, or surgery on an artery in your head, neck, heart, or legs? No   3. Do you have chest pain with activity? No   4. Do you have a history of  heart failure? No   5. Do you currently have a cold, bronchitis or symptoms of other infection? No   6. Do you have a cough, shortness of breath, or wheezing? No   7. Do you or anyone in your family have previous history of blood clots? No   8. Do you or does anyone in your family have a serious bleeding problem such as prolonged bleeding following surgeries or cuts? No   9. Have you ever had problems with anemia or been told to take iron pills? No   10. Have you had any abnormal blood loss such as black, tarry or bloody stools, or abnormal vaginal bleeding? No   11. Have you ever had a blood transfusion? No   12. Are you willing to have a blood transfusion if it is medically needed before, during, or after your surgery? Yes   13. Have you or any of your relatives ever had problems with anesthesia? Father-difficulty breathing after surgery but also had lung CA.   14. Do you have sleep apnea, excessive snoring or daytime drowsiness? No   15. Do you have any artifical heart valves or other implanted  medical devices like a pacemaker, defibrillator, or continuous glucose monitor? No   16. Do you have artificial joints? No   17. Are you allergic to latex? No       Health Care Directive:  Patient does not have a Health Care Directive or Living Will: Patient states has Advance Directive and will bring in a copy to clinic.    Preoperative Review of :   reviewed - no record of controlled substances prescribed.    Status of Chronic Conditions:  See problem list for active medical problems.  Problems all longstanding and stable, except as noted/documented.  See ROS for pertinent symptoms related to these conditions.      Review of Systems  Constitutional, neuro, ENT, endocrine, pulmonary, cardiac, gastrointestinal, genitourinary, musculoskeletal, integument and psychiatric systems are negative, except as otherwise noted.    There are no problems to display for this patient.     Past Medical History:   Diagnosis Date     Diabetes (H)      Hypertension      Past Surgical History:   Procedure Laterality Date     COLONOSCOPY      2-3 times     GYN SURGERY  in [place]    ovaries removed Secondary to a Benign cyst. Has her Uterus.     Current Outpatient Medications   Medication Sig Dispense Refill     amLODIPine (NORVASC) 2.5 MG tablet Take 1 tablet (2.5 mg) by mouth daily (Patient taking differently: Take 2.5 mg by mouth every morning) 90 tablet 3     atorvastatin (LIPITOR) 40 MG tablet Take 1 tablet (40 mg) by mouth daily 90 tablet 3     blood glucose (NO BRAND SPECIFIED) test strip Use to test blood sugar 1-3 times weekly or as directed. 100 strip 1     Calcium Carb-Cholecalciferol (CALCIUM 500+D3 PO) Take 1 capful by mouth daily       lisinopril-hydrochlorothiazide (ZESTORETIC) 20-12.5 MG tablet Take 2 tablets by mouth daily (Patient taking differently: Take 2 tablets by mouth every evening) 180 tablet 0     metFORMIN (GLUCOPHAGE-XR) 500 MG 24 hr tablet Take 4 tablets (2,000 mg) by mouth daily (with dinner) 4  "tablets daily with dinner 360 tablet 3     vitamin C (ASCORBIC ACID) 1000 MG TABS Take 1,000 mg by mouth daily       vitamin D3 (CHOLECALCIFEROL) 50 mcg (2000 units) tablet Take 1 tablet by mouth daily         Allergies   Allergen Reactions     Cats      \"Asthmatic type\" reaction.        Social History     Tobacco Use     Smoking status: Never Smoker     Smokeless tobacco: Never Used   Substance Use Topics     Alcohol use: Yes     Comment: less than 1 drink per week     Family History   Problem Relation Age of Onset     Diabetes Mother         type 2     Macular Degeneration Mother      Other Cancer Father         lung cancer +     Hypertension Sister      Skin Cancer Sister      Coronary Artery Disease Maternal Grandfather         brain aneurysm     Cerebrovascular Disease Maternal Grandmother      Other Cancer Paternal Grandmother         esophagus?     History   Drug Use Unknown         Objective     /84 (BP Location: Right arm, Patient Position: Chair, Cuff Size: Adult Large)   Pulse 86   Temp 99  F (37.2  C) (Tympanic)   Ht 1.626 m (5' 4\")   Wt 91.2 kg (201 lb)   SpO2 97%   BMI 34.50 kg/m      Physical Exam    GENERAL APPEARANCE: healthy, alert and no distress     EYES: EOMI, PERRL     HENT: ear canals and TM's normal and nose and mouth without ulcers or lesions     NECK: no adenopathy, no asymmetry, masses, or scars and thyroid normal to palpation     RESP: lungs clear to auscultation - no rales, rhonchi or wheezes     CV: regular rates and rhythm, normal S1 S2, no S3 or S4 and no murmur, click or rub     ABDOMEN:  soft, nontender, no HSM or masses and bowel sounds normal     MS: extremities normal- no gross deformities noted, no evidence of inflammation in joints, FROM in all extremities.     SKIN: no suspicious lesions or rashes     NEURO: Normal strength and tone, sensory exam grossly normal, mentation intact and speech normal     PSYCH: mentation appears normal. and affect normal/bright     " LYMPHATICS: No cervical adenopathy    Recent Labs   Lab Test 02/01/22  0911 08/17/21  1449 08/17/21  1432 12/21/20  0830   HGB 12.5  --   --  12.2     --   --  265     --  133 132*   POTASSIUM 3.5  --   --  4.1   CR 0.65  --   --  0.67   A1C 6.3* 6.3*  --  6.3*        Diagnostics:  No labs were ordered during this visit.   EKG: appears normal, NSR, normal axis, normal intervals, no acute ST/T changes c/w ischemia, no LVH by voltage criteria, unchanged from previous tracings    Revised Cardiac Risk Index (RCRI):  The patient has the following serious cardiovascular risks for perioperative complications:   - No serious cardiac risks = 0 points     RCRI Interpretation: 0 points: Class I (very low risk - 0.4% complication rate)             Signed Electronically by: YESSI Crews CNP  Copy of this evaluation report is provided to requesting physician.

## 2022-07-11 NOTE — PROGRESS NOTES
[address] [address]  Phone: [phone]  Primary Provider: Georgina Caputo  Pre-op Performing Provider: GEORGINA CAPUTO    PREOPERATIVE EVALUATION:  Today's date: 7/11/2022    Janelle Bhat is a 66 year old female who presents for a preoperative evaluation.    Surgical Information:  Surgery/Procedure: Right breast excisional biopsy with localization  Surgery Location: [facility]  Surgeon: Dr Reginaldo Miles  Surgery Date: 07/14/2022  Time of Surgery: 8:45a  Where patient plans to recover: At home with family  Fax number for surgical facility: Note does not need to be faxed, will be available electronically in Epic.    Type of Anesthesia Anticipated: General    Assessment & Plan     The proposed surgical procedure is considered INTERMEDIATE risk.    Preop general physical exam  Abnormal mammogram  Type 2 diabetes mellitus without complication, without long-term current use of insulin (H)  Hyperlipidemia LDL goal < 100  Hypertension goal BP (blood pressure) < 130/80  Cleared for surgery without concerns.  Medications discussed and instructions provided.  Janelle Dickinson verbalizes understanding of plan of care and is in agreement.   - EKG 12-lead complete w/read - Clinics    Risks and Recommendations:  The patient has the following additional risks and recommendations for perioperative complications:   - No identified additional risk factors other than previously addressed    Medication Instructions:   - ACE/ARB: decrease dose to one tablet of Lisinopril/HCTZ 20-12.5 the night before surgery.    - Calcium Channel Blockers: May be continued on the day of surgery.   - Statins: Continue taking takes the night before  surgery.    - metformin: HOLD night before surgery.  Encourage no NSAIDS, aspirin or vitamin supplementation for the next few days.  Tylenol is okay.    RECOMMENDATION:  APPROVAL GIVEN to proceed with  proposed procedure, without further diagnostic evaluation.    Subjective     HPI related to upcoming procedure: Abnormal mammogram on 6-3-2022.  Additional imaging revealed new indeterminate calcifications clustered at the 9 o'clock position of the right breast stereotactic biopsy completed.  Pathology: fibroadenoma with several foci of intraductal micro calcifications and benign fibrofatty tissue with focal columnar cell change and minute intraductal papilloma with calcifications also present  Surgeons plan: recommended excisional biopsy in order to rule out adjacent malignancy or atypia.      Preop Questions 7/6/2022   1. Have you ever had a heart attack or stroke? No   2. Have you ever had surgery on your heart or blood vessels, such as a stent placement, a coronary artery bypass, or surgery on an artery in your head, neck, heart, or legs? No   3. Do you have chest pain with activity? No   4. Do you have a history of  heart failure? No   5. Do you currently have a cold, bronchitis or symptoms of other infection? No   6. Do you have a cough, shortness of breath, or wheezing? No   7. Do you or anyone in your family have previous history of blood clots? No   8. Do you or does anyone in your family have a serious bleeding problem such as prolonged bleeding following surgeries or cuts? No   9. Have you ever had problems with anemia or been told to take iron pills? No   10. Have you had any abnormal blood loss such as black, tarry or bloody stools, or abnormal vaginal bleeding? No   11. Have you ever had a blood transfusion? No   12. Are you willing to have a blood transfusion if it is medically needed before, during, or after your surgery? Yes   13. Have you or any of your relatives ever had problems with anesthesia? Father-difficulty breathing after surgery but also had lung CA.   14. Do you have sleep apnea, excessive snoring or daytime drowsiness? No   15. Do you have any artifical heart valves or other implanted  medical devices like a pacemaker, defibrillator, or continuous glucose monitor? No   16. Do you have artificial joints? No   17. Are you allergic to latex? No       Health Care Directive:  Patient does not have a Health Care Directive or Living Will: Patient states has Advance Directive and will bring in a copy to clinic.    Preoperative Review of :   reviewed - no record of controlled substances prescribed.    Status of Chronic Conditions:  See problem list for active medical problems.  Problems all longstanding and stable, except as noted/documented.  See ROS for pertinent symptoms related to these conditions.      Review of Systems  Constitutional, neuro, ENT, endocrine, pulmonary, cardiac, gastrointestinal, genitourinary, musculoskeletal, integument and psychiatric systems are negative, except as otherwise noted.    There are no problems to display for this patient.     Past Medical History:   Diagnosis Date     Diabetes (H)      Hypertension      Past Surgical History:   Procedure Laterality Date     COLONOSCOPY      2-3 times     GYN SURGERY  in [place]    ovaries removed Secondary to a Benign cyst. Has her Uterus.     Current Outpatient Medications   Medication Sig Dispense Refill     amLODIPine (NORVASC) 2.5 MG tablet Take 1 tablet (2.5 mg) by mouth daily (Patient taking differently: Take 2.5 mg by mouth every morning) 90 tablet 3     atorvastatin (LIPITOR) 40 MG tablet Take 1 tablet (40 mg) by mouth daily 90 tablet 3     blood glucose (NO BRAND SPECIFIED) test strip Use to test blood sugar 1-3 times weekly or as directed. 100 strip 1     Calcium Carb-Cholecalciferol (CALCIUM 500+D3 PO) Take 1 capful by mouth daily       lisinopril-hydrochlorothiazide (ZESTORETIC) 20-12.5 MG tablet Take 2 tablets by mouth daily (Patient taking differently: Take 2 tablets by mouth every evening) 180 tablet 0     metFORMIN (GLUCOPHAGE-XR) 500 MG 24 hr tablet Take 4 tablets (2,000 mg) by mouth daily (with dinner) 4  "tablets daily with dinner 360 tablet 3     vitamin C (ASCORBIC ACID) 1000 MG TABS Take 1,000 mg by mouth daily       vitamin D3 (CHOLECALCIFEROL) 50 mcg (2000 units) tablet Take 1 tablet by mouth daily         Allergies   Allergen Reactions     Cats      \"Asthmatic type\" reaction.        Social History     Tobacco Use     Smoking status: Never Smoker     Smokeless tobacco: Never Used   Substance Use Topics     Alcohol use: Yes     Comment: less than 1 drink per week     Family History   Problem Relation Age of Onset     Diabetes Mother         type 2     Macular Degeneration Mother      Other Cancer Father         lung cancer +     Hypertension Sister      Skin Cancer Sister      Coronary Artery Disease Maternal Grandfather         brain aneurysm     Cerebrovascular Disease Maternal Grandmother      Other Cancer Paternal Grandmother         esophagus?     History   Drug Use Unknown         Objective     /84 (BP Location: Right arm, Patient Position: Chair, Cuff Size: Adult Large)   Pulse 86   Temp 99  F (37.2  C) (Tympanic)   Ht 1.626 m (5' 4\")   Wt 91.2 kg (201 lb)   SpO2 97%   BMI 34.50 kg/m      Physical Exam    GENERAL APPEARANCE: healthy, alert and no distress     EYES: EOMI, PERRL     HENT: ear canals and TM's normal and nose and mouth without ulcers or lesions     NECK: no adenopathy, no asymmetry, masses, or scars and thyroid normal to palpation     RESP: lungs clear to auscultation - no rales, rhonchi or wheezes     CV: regular rates and rhythm, normal S1 S2, no S3 or S4 and no murmur, click or rub     ABDOMEN:  soft, nontender, no HSM or masses and bowel sounds normal     MS: extremities normal- no gross deformities noted, no evidence of inflammation in joints, FROM in all extremities.     SKIN: no suspicious lesions or rashes     NEURO: Normal strength and tone, sensory exam grossly normal, mentation intact and speech normal     PSYCH: mentation appears normal. and affect normal/bright     " LYMPHATICS: No cervical adenopathy    Recent Labs   Lab Test 02/01/22  0911 08/17/21  1449 08/17/21  1432 12/21/20  0830   HGB 12.5  --   --  12.2     --   --  265     --  133 132*   POTASSIUM 3.5  --   --  4.1   CR 0.65  --   --  0.67   A1C 6.3* 6.3*  --  6.3*        Diagnostics:  No labs were ordered during this visit.   EKG: appears normal, NSR, normal axis, normal intervals, no acute ST/T changes c/w ischemia, no LVH by voltage criteria, unchanged from previous tracings    Revised Cardiac Risk Index (RCRI):  The patient has the following serious cardiovascular risks for perioperative complications:   - No serious cardiac risks = 0 points     RCRI Interpretation: 0 points: Class I (very low risk - 0.4% complication rate)             Signed Electronically by: YESSI Crews CNP  Copy of this evaluation report is provided to requesting physician.

## 2022-07-13 ENCOUNTER — HOSPITAL ENCOUNTER (OUTPATIENT)
Dept: ULTRASOUND IMAGING | Facility: CLINIC | Age: 67
Discharge: HOME | End: 2022-07-13
Attending: SURGERY
Payer: COMMERCIAL

## 2022-07-13 ENCOUNTER — HOSPITAL ENCOUNTER (OUTPATIENT)
Dept: MAMMOGRAPHY | Facility: CLINIC | Age: 67
Discharge: HOME | End: 2022-07-13
Attending: SURGERY
Payer: COMMERCIAL

## 2022-07-13 DIAGNOSIS — D24.1 INTRADUCTAL PAPILLOMA OF RIGHT BREAST: ICD-10-CM

## 2022-07-13 PROCEDURE — A4648 IMPLANTABLE TISSUE MARKER: HCPCS

## 2022-07-13 PROCEDURE — 250N000009 HC RX 250: Performed by: SURGERY

## 2022-07-13 PROCEDURE — 999N000065 MA POST PROCEDURE RIGHT

## 2022-07-13 RX ADMIN — LIDOCAINE HYDROCHLORIDE 5 ML: 10 INJECTION, SOLUTION EPIDURAL; INFILTRATION; INTRACAUDAL; PERINEURAL at 08:56

## 2022-07-14 ENCOUNTER — HOSPITAL ENCOUNTER (OUTPATIENT)
Dept: MAMMOGRAPHY | Facility: CLINIC | Age: 67
Discharge: HOME | End: 2022-07-14
Attending: SURGERY | Admitting: SURGERY
Payer: COMMERCIAL

## 2022-07-14 ENCOUNTER — ANESTHESIA (OUTPATIENT)
Dept: SURGERY | Facility: CLINIC | Age: 67
End: 2022-07-14
Payer: COMMERCIAL

## 2022-07-14 ENCOUNTER — ANESTHESIA EVENT (OUTPATIENT)
Dept: SURGERY | Facility: CLINIC | Age: 67
End: 2022-07-14
Payer: COMMERCIAL

## 2022-07-14 ENCOUNTER — HOSPITAL ENCOUNTER (OUTPATIENT)
Facility: CLINIC | Age: 67
Discharge: HOME | End: 2022-07-14
Attending: SURGERY | Admitting: SURGERY
Payer: COMMERCIAL

## 2022-07-14 VITALS
HEART RATE: 66 BPM | WEIGHT: 201 LBS | RESPIRATION RATE: 16 BRPM | BODY MASS INDEX: 34.31 KG/M2 | DIASTOLIC BLOOD PRESSURE: 68 MMHG | HEIGHT: 64 IN | OXYGEN SATURATION: 95 % | SYSTOLIC BLOOD PRESSURE: 125 MMHG | TEMPERATURE: 98.1 F

## 2022-07-14 DIAGNOSIS — D24.1 INTRADUCTAL PAPILLOMA OF RIGHT BREAST: ICD-10-CM

## 2022-07-14 LAB
CREAT SERPL-MCNC: 0.64 MG/DL (ref 0.52–1.04)
GFR SERPL CREATININE-BSD FRML MDRD: >90 ML/MIN/1.73M2
GLUCOSE BLDC GLUCOMTR-MCNC: 116 MG/DL (ref 70–99)
GLUCOSE BLDC GLUCOMTR-MCNC: 134 MG/DL (ref 70–99)
HOLD SPECIMEN: NORMAL
POTASSIUM BLD-SCNC: 3.4 MMOL/L (ref 3.4–5.3)

## 2022-07-14 PROCEDURE — 999N000104 MA BREAST SPECIMEN RIGHT OR

## 2022-07-14 PROCEDURE — 82565 ASSAY OF CREATININE: CPT | Performed by: ANESTHESIOLOGY

## 2022-07-14 PROCEDURE — 999N000141 HC STATISTIC PRE-PROCEDURE NURSING ASSESSMENT: Performed by: SURGERY

## 2022-07-14 PROCEDURE — 250N000011 HC RX IP 250 OP 636: Performed by: NURSE ANESTHETIST, CERTIFIED REGISTERED

## 2022-07-14 PROCEDURE — 370N000017 HC ANESTHESIA TECHNICAL FEE, PER MIN: Performed by: SURGERY

## 2022-07-14 PROCEDURE — 250N000009 HC RX 250: Performed by: NURSE ANESTHETIST, CERTIFIED REGISTERED

## 2022-07-14 PROCEDURE — 360N000083 HC SURGERY LEVEL 3 W/ FLUORO, PER MIN: Performed by: SURGERY

## 2022-07-14 PROCEDURE — 88307 TISSUE EXAM BY PATHOLOGIST: CPT | Mod: 26 | Performed by: PATHOLOGY

## 2022-07-14 PROCEDURE — 710N000009 HC RECOVERY PHASE 1, LEVEL 1, PER MIN: Performed by: SURGERY

## 2022-07-14 PROCEDURE — 258N000003 HC RX IP 258 OP 636: Performed by: ANESTHESIOLOGY

## 2022-07-14 PROCEDURE — 250N000011 HC RX IP 250 OP 636: Performed by: SURGERY

## 2022-07-14 PROCEDURE — 250N000009 HC RX 250: Performed by: ANESTHESIOLOGY

## 2022-07-14 PROCEDURE — 19120 REMOVAL OF BREAST LESION: CPT | Mod: RT | Performed by: SURGERY

## 2022-07-14 PROCEDURE — 88307 TISSUE EXAM BY PATHOLOGIST: CPT | Mod: TC | Performed by: SURGERY

## 2022-07-14 PROCEDURE — 258N000003 HC RX IP 258 OP 636: Performed by: NURSE ANESTHETIST, CERTIFIED REGISTERED

## 2022-07-14 PROCEDURE — 272N000001 HC OR GENERAL SUPPLY STERILE: Performed by: SURGERY

## 2022-07-14 PROCEDURE — 710N000012 HC RECOVERY PHASE 2, PER MINUTE: Performed by: SURGERY

## 2022-07-14 PROCEDURE — 82962 GLUCOSE BLOOD TEST: CPT

## 2022-07-14 PROCEDURE — 36415 COLL VENOUS BLD VENIPUNCTURE: CPT | Performed by: ANESTHESIOLOGY

## 2022-07-14 PROCEDURE — 272N000002 HC OR SUPPLY OTHER OPNP: Performed by: SURGERY

## 2022-07-14 PROCEDURE — 250N000009 HC RX 250: Performed by: SURGERY

## 2022-07-14 PROCEDURE — 84132 ASSAY OF SERUM POTASSIUM: CPT | Performed by: ANESTHESIOLOGY

## 2022-07-14 RX ORDER — CEFAZOLIN SODIUM/WATER 2 G/20 ML
2 SYRINGE (ML) INTRAVENOUS SEE ADMIN INSTRUCTIONS
Status: DISCONTINUED | OUTPATIENT
Start: 2022-07-14 | End: 2022-07-14 | Stop reason: HOSPADM

## 2022-07-14 RX ORDER — SODIUM CHLORIDE, SODIUM LACTATE, POTASSIUM CHLORIDE, CALCIUM CHLORIDE 600; 310; 30; 20 MG/100ML; MG/100ML; MG/100ML; MG/100ML
INJECTION, SOLUTION INTRAVENOUS CONTINUOUS PRN
Status: DISCONTINUED | OUTPATIENT
Start: 2022-07-14 | End: 2022-07-14

## 2022-07-14 RX ORDER — HYDROMORPHONE HCL IN WATER/PF 6 MG/30 ML
0.4 PATIENT CONTROLLED ANALGESIA SYRINGE INTRAVENOUS EVERY 5 MIN PRN
Status: DISCONTINUED | OUTPATIENT
Start: 2022-07-14 | End: 2022-07-14 | Stop reason: HOSPADM

## 2022-07-14 RX ORDER — ALBUTEROL SULFATE 0.83 MG/ML
2.5 SOLUTION RESPIRATORY (INHALATION) EVERY 4 HOURS PRN
Status: DISCONTINUED | OUTPATIENT
Start: 2022-07-14 | End: 2022-07-14 | Stop reason: HOSPADM

## 2022-07-14 RX ORDER — PROPOFOL 10 MG/ML
INJECTION, EMULSION INTRAVENOUS PRN
Status: DISCONTINUED | OUTPATIENT
Start: 2022-07-14 | End: 2022-07-14

## 2022-07-14 RX ORDER — OXYCODONE HYDROCHLORIDE 5 MG/1
5 TABLET ORAL EVERY 4 HOURS PRN
Status: DISCONTINUED | OUTPATIENT
Start: 2022-07-14 | End: 2022-07-14 | Stop reason: HOSPADM

## 2022-07-14 RX ORDER — LABETALOL HYDROCHLORIDE 5 MG/ML
10 INJECTION, SOLUTION INTRAVENOUS
Status: DISCONTINUED | OUTPATIENT
Start: 2022-07-14 | End: 2022-07-14 | Stop reason: HOSPADM

## 2022-07-14 RX ORDER — PROPOFOL 10 MG/ML
INJECTION, EMULSION INTRAVENOUS CONTINUOUS PRN
Status: DISCONTINUED | OUTPATIENT
Start: 2022-07-14 | End: 2022-07-14

## 2022-07-14 RX ORDER — EPHEDRINE SULFATE 50 MG/ML
INJECTION, SOLUTION INTRAVENOUS PRN
Status: DISCONTINUED | OUTPATIENT
Start: 2022-07-14 | End: 2022-07-14

## 2022-07-14 RX ORDER — LIDOCAINE 40 MG/G
CREAM TOPICAL
Status: DISCONTINUED | OUTPATIENT
Start: 2022-07-14 | End: 2022-07-14 | Stop reason: HOSPADM

## 2022-07-14 RX ORDER — ONDANSETRON 2 MG/ML
INJECTION INTRAMUSCULAR; INTRAVENOUS PRN
Status: DISCONTINUED | OUTPATIENT
Start: 2022-07-14 | End: 2022-07-14

## 2022-07-14 RX ORDER — OXYCODONE HYDROCHLORIDE 5 MG/1
5 TABLET ORAL
Status: DISCONTINUED | OUTPATIENT
Start: 2022-07-14 | End: 2022-07-14 | Stop reason: HOSPADM

## 2022-07-14 RX ORDER — ONDANSETRON 2 MG/ML
4 INJECTION INTRAMUSCULAR; INTRAVENOUS EVERY 30 MIN PRN
Status: DISCONTINUED | OUTPATIENT
Start: 2022-07-14 | End: 2022-07-14 | Stop reason: HOSPADM

## 2022-07-14 RX ORDER — SODIUM CHLORIDE, SODIUM LACTATE, POTASSIUM CHLORIDE, CALCIUM CHLORIDE 600; 310; 30; 20 MG/100ML; MG/100ML; MG/100ML; MG/100ML
INJECTION, SOLUTION INTRAVENOUS CONTINUOUS
Status: DISCONTINUED | OUTPATIENT
Start: 2022-07-14 | End: 2022-07-14 | Stop reason: HOSPADM

## 2022-07-14 RX ORDER — FENTANYL CITRATE 50 UG/ML
INJECTION, SOLUTION INTRAMUSCULAR; INTRAVENOUS PRN
Status: DISCONTINUED | OUTPATIENT
Start: 2022-07-14 | End: 2022-07-14

## 2022-07-14 RX ORDER — CEFAZOLIN SODIUM/WATER 2 G/20 ML
2 SYRINGE (ML) INTRAVENOUS
Status: COMPLETED | OUTPATIENT
Start: 2022-07-14 | End: 2022-07-14

## 2022-07-14 RX ORDER — ONDANSETRON 4 MG/1
4 TABLET, ORALLY DISINTEGRATING ORAL EVERY 30 MIN PRN
Status: DISCONTINUED | OUTPATIENT
Start: 2022-07-14 | End: 2022-07-14 | Stop reason: HOSPADM

## 2022-07-14 RX ORDER — OXYCODONE HYDROCHLORIDE 5 MG/1
5-10 TABLET ORAL EVERY 4 HOURS PRN
Qty: 16 TABLET | Refills: 0 | Status: SHIPPED | OUTPATIENT
Start: 2022-07-14 | End: 2023-04-11

## 2022-07-14 RX ORDER — DEXAMETHASONE SODIUM PHOSPHATE 4 MG/ML
INJECTION, SOLUTION INTRA-ARTICULAR; INTRALESIONAL; INTRAMUSCULAR; INTRAVENOUS; SOFT TISSUE PRN
Status: DISCONTINUED | OUTPATIENT
Start: 2022-07-14 | End: 2022-07-14

## 2022-07-14 RX ORDER — MAGNESIUM HYDROXIDE 1200 MG/15ML
LIQUID ORAL PRN
Status: DISCONTINUED | OUTPATIENT
Start: 2022-07-14 | End: 2022-07-14 | Stop reason: HOSPADM

## 2022-07-14 RX ORDER — BUPIVACAINE HYDROCHLORIDE 5 MG/ML
INJECTION, SOLUTION EPIDURAL; INTRACAUDAL PRN
Status: DISCONTINUED | OUTPATIENT
Start: 2022-07-14 | End: 2022-07-14 | Stop reason: HOSPADM

## 2022-07-14 RX ORDER — GLYCOPYRROLATE 0.2 MG/ML
INJECTION, SOLUTION INTRAMUSCULAR; INTRAVENOUS PRN
Status: DISCONTINUED | OUTPATIENT
Start: 2022-07-14 | End: 2022-07-14

## 2022-07-14 RX ORDER — ONDANSETRON 4 MG/1
4 TABLET, ORALLY DISINTEGRATING ORAL EVERY 8 HOURS PRN
Qty: 8 TABLET | Refills: 0 | Status: SHIPPED | OUTPATIENT
Start: 2022-07-14 | End: 2023-04-11

## 2022-07-14 RX ORDER — FENTANYL CITRATE 50 UG/ML
50 INJECTION, SOLUTION INTRAMUSCULAR; INTRAVENOUS
Status: DISCONTINUED | OUTPATIENT
Start: 2022-07-14 | End: 2022-07-14 | Stop reason: HOSPADM

## 2022-07-14 RX ORDER — PHENYLEPHRINE HYDROCHLORIDE 10 MG/ML
INJECTION INTRAVENOUS PRN
Status: DISCONTINUED | OUTPATIENT
Start: 2022-07-14 | End: 2022-07-14

## 2022-07-14 RX ORDER — FENTANYL CITRATE 50 UG/ML
50 INJECTION, SOLUTION INTRAMUSCULAR; INTRAVENOUS EVERY 5 MIN PRN
Status: DISCONTINUED | OUTPATIENT
Start: 2022-07-14 | End: 2022-07-14 | Stop reason: HOSPADM

## 2022-07-14 RX ORDER — HYDRALAZINE HYDROCHLORIDE 20 MG/ML
2.5-5 INJECTION INTRAMUSCULAR; INTRAVENOUS EVERY 10 MIN PRN
Status: DISCONTINUED | OUTPATIENT
Start: 2022-07-14 | End: 2022-07-14 | Stop reason: HOSPADM

## 2022-07-14 RX ADMIN — MIDAZOLAM 2 MG: 1 INJECTION INTRAMUSCULAR; INTRAVENOUS at 09:33

## 2022-07-14 RX ADMIN — PHENYLEPHRINE HYDROCHLORIDE 200 MCG: 10 INJECTION INTRAVENOUS at 10:21

## 2022-07-14 RX ADMIN — LIDOCAINE HYDROCHLORIDE 5 ML: 10 INJECTION, SOLUTION EPIDURAL; INFILTRATION; INTRACAUDAL; PERINEURAL at 09:39

## 2022-07-14 RX ADMIN — GLYCOPYRROLATE 0.1 MG: 0.2 INJECTION, SOLUTION INTRAMUSCULAR; INTRAVENOUS at 10:00

## 2022-07-14 RX ADMIN — SODIUM CHLORIDE, POTASSIUM CHLORIDE, SODIUM LACTATE AND CALCIUM CHLORIDE: 600; 310; 30; 20 INJECTION, SOLUTION INTRAVENOUS at 07:29

## 2022-07-14 RX ADMIN — GLYCOPYRROLATE 0.1 MG: 0.2 INJECTION, SOLUTION INTRAMUSCULAR; INTRAVENOUS at 10:04

## 2022-07-14 RX ADMIN — EPHEDRINE SULFATE 10 MG: 50 INJECTION, SOLUTION INTRAVENOUS at 10:20

## 2022-07-14 RX ADMIN — FENTANYL CITRATE 50 MCG: 50 INJECTION, SOLUTION INTRAMUSCULAR; INTRAVENOUS at 10:08

## 2022-07-14 RX ADMIN — Medication 2 G: at 09:33

## 2022-07-14 RX ADMIN — PROPOFOL 200 MG: 10 INJECTION, EMULSION INTRAVENOUS at 09:39

## 2022-07-14 RX ADMIN — PROPOFOL 30 MCG/KG/MIN: 10 INJECTION, EMULSION INTRAVENOUS at 09:47

## 2022-07-14 RX ADMIN — FENTANYL CITRATE 50 MCG: 50 INJECTION, SOLUTION INTRAMUSCULAR; INTRAVENOUS at 10:15

## 2022-07-14 RX ADMIN — DEXAMETHASONE SODIUM PHOSPHATE 4 MG: 4 INJECTION, SOLUTION INTRA-ARTICULAR; INTRALESIONAL; INTRAMUSCULAR; INTRAVENOUS; SOFT TISSUE at 09:39

## 2022-07-14 RX ADMIN — ONDANSETRON HYDROCHLORIDE 4 MG: 2 INJECTION, SOLUTION INTRAVENOUS at 09:56

## 2022-07-14 RX ADMIN — SODIUM CHLORIDE, POTASSIUM CHLORIDE, SODIUM LACTATE AND CALCIUM CHLORIDE: 600; 310; 30; 20 INJECTION, SOLUTION INTRAVENOUS at 09:18

## 2022-07-14 RX ADMIN — FENTANYL CITRATE 100 MCG: 50 INJECTION, SOLUTION INTRAMUSCULAR; INTRAVENOUS at 09:39

## 2022-07-14 NOTE — OP NOTE
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   Anesthesia: General    Estimated blood loss: 5 cc's   Drains placed: None   Complications:  None   Findings:   To specimen was removed with the assistance of the RFID localizer.  The first specimen did not reveal the localizer within it and therefore additional deep tissue was taken.  Both specimens were marked for orientation.  The second piece of tissue revealed the marking clip and the localizer.     Indications for operation: This is a 66-year-old woman with significant macromastia recently found to have changing calcifications in the right breast.  Biopsy subsequently revealed a tiny papilloma within the biopsy specimen.  RFID localized excisional biopsy was therefore recommended.  We discussed the procedure, along with its risks and complications, in detail.  The patient agreed to proceed.    Details of the operation: After informed consent, the patient underwent placement of the RFID localizing device the day prior to surgery.  She was brought to the operating room this morning and underwent satisfactory induction of general anesthesia.  The patient was sterilely prepped and draped.  An incision was made in the lateral breast over the area of the localizer signal.  Dissection was carried down through the breast tissue towards the localizer signal.  We removed a generous portion of tissue utilizing electrocautery.  This did not reveal the localizer signal, however.  There was further  signal down near the chest wall.  Therefore, a second area was removed.  The second area revealed the localizer and the marking clip on the specimen mammogram.  Both specimens were marked for orientation.  The first specimen had had some calcifications, but no marking clip.  Hemostasis was assured using electrocautery.  Local anesthetic was injected and the incision was irrigated out.  The subdermal tissues were approximated using interrupted 3-0 Vicryl sutures and skin was closed using skin adhesive.    The patient tolerated the procedure well and was transferred to the recovery room in satisfactory condition.  Sponge and needle counts were correct at the close of the case.  Specimens:   ID Type Source Tests Collected by Time Destination   1 : RIGHT BREAST BIOPSY, short suture superior, long suture lateral Tissue Breast, Right SURGICAL PATHOLOGY EXAM Reginaldo Miles MD 7/14/2022 10:23 AM    2 : RIGHT EXCISIONAL BREAST BIOPSY, deep to specimen #1 Tissue Breast, Right SURGICAL PATHOLOGY EXAM Reginaldo Miles MD 7/14/2022 10:30 AM            Reginaldo Miles MD

## 2022-07-14 NOTE — ANESTHESIA PREPROCEDURE EVALUATION
"Anesthesia Pre-Procedure Evaluation    Patient: Janelle Bhat   MRN: [id number] : 1955        Procedure : Procedure(s):  Right breast excisional biopsy with localization          Past Medical History:   Diagnosis Date     Diabetes (H)      Hypertension       Past Surgical History:   Procedure Laterality Date     COLONOSCOPY      2-3 times     GYN SURGERY  in [place]    ovaries removed Secondary to a Benign cyst. Has her Uterus.      Allergies   Allergen Reactions     Cats      \"Asthmatic type\" reaction.      Social History     Tobacco Use     Smoking status: Never Smoker     Smokeless tobacco: Never Used   Substance Use Topics     Alcohol use: Yes     Comment: less than 1 drink per week      Wt Readings from Last 1 Encounters:   22 91.2 kg (201 lb)        Anesthesia Evaluation            ROS/MED HX  ENT/Pulmonary:  - neg pulmonary ROS     Neurologic:       Cardiovascular:     (+) hypertension-----    METS/Exercise Tolerance:     Hematologic:       Musculoskeletal:       GI/Hepatic:       Renal/Genitourinary:       Endo:     (+) type II DM, Obesity (BMI 34),     Psychiatric/Substance Use:       Infectious Disease:       Malignancy:       Other:            Physical Exam    Airway        Mallampati: II   TM distance: > 3 FB   Neck ROM: full     Respiratory Devices and Support         Dental           Cardiovascular   cardiovascular exam normal          Pulmonary   pulmonary exam normal                OUTSIDE LABS:  CBC:   Lab Results   Component Value Date    WBC 6.7 2022    WBC 7.1 2020    HGB 12.5 2022    HGB 12.2 2020    HCT 37.5 2022    HCT 37.3 2020     2022     2020     BMP:   Lab Results   Component Value Date     2022     2021    POTASSIUM 3.4 2022    POTASSIUM 3.5 2022    CHLORIDE 97 2022    CHLORIDE 97 2020    CO2 30 2022    CO2 32 2020    BUN 12 2022    BUN " 12 12/21/2020    CR 0.64 07/14/2022    CR 0.65 02/01/2022     (H) 07/14/2022    GLC 94 02/01/2022     COAGS: No results found for: PTT, INR, FIBR  POC: No results found for: BGM, HCG, HCGS  HEPATIC:   Lab Results   Component Value Date    ALBUMIN 4.0 02/01/2022    PROTTOTAL 7.5 02/01/2022    ALT 25 02/01/2022    AST 13 02/01/2022    ALKPHOS 55 02/01/2022    BILITOTAL 0.5 02/01/2022     OTHER:   Lab Results   Component Value Date    A1C 6.3 (H) 02/01/2022    AMY 9.6 02/01/2022    TSH 1.73 02/01/2022       Anesthesia Plan    ASA Status:  2      Anesthesia Type: General.     - Airway: LMA   Induction: Intravenous.   Maintenance: Balanced.        Consents    Anesthesia Plan(s) and associated risks, benefits, and realistic alternatives discussed. Questions answered and patient/representative(s) expressed understanding.    - Discussed:     - Discussed with:  Patient         Postoperative Care    Pain management: IV analgesics, Oral pain medications, Multi-modal analgesia.   PONV prophylaxis: Ondansetron (or other 5HT-3), Background Propofol Infusion     Comments:                Luiza Carlisle MD

## 2022-07-14 NOTE — ANESTHESIA CARE TRANSFER NOTE
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    Anesthesia Type:   General     Note:    Oropharynx: oral airway in place  Level of Consciousness: drowsy  Oxygen Supplementation: face mask  Level of Supplemental Oxygen (L/min / FiO2): 6  Independent Airway: airway patency satisfactory and stable  Dentition: dentition unchanged  Vital Signs Stable: post-procedure vital signs reviewed and stable  Report to RN Given: handoff report given  Patient transferred to: PACU    Handoff Report: Identifed the Patient, Identified the Reponsible Provider, Reviewed the pertinent medical history, Discussed the surgical course, Reviewed Intra-OP anesthesia mangement and issues during anesthesia, Set expectations for post-procedure period and Allowed opportunity for questions and acknowledgement of understanding      Vitals:  Vitals Value Taken Time   /72 07/14/22 1050   Temp 97.9  F (36.6  C) 07/14/22 1050   Pulse 76 07/14/22 1050   Resp 20 07/14/22 1050   SpO2 99 % 07/14/22 1050       Electronically Signed By: YESSI Trejo CRNA  July 14, 2022  10:51 AM

## 2022-07-14 NOTE — INTERVAL H&P NOTE
"I have reviewed the surgical (or preoperative) H&P that is linked to this encounter, and examined the patient. There are no significant changes    Clinical Conditions Present on Arrival:  Clinically Significant Risk Factors Present on Admission                   # Obesity: Estimated body mass index is 34.5 kg/m  as calculated from the following:    Height as of this encounter: 1.626 m (5' 4\").    Weight as of this encounter: 91.2 kg (201 lb).       "

## 2022-07-14 NOTE — DISCHARGE INSTRUCTIONS
HOME CARE FOLLOWING BREAST BIOPSY  GREGORIO Luna, CHELSIE Hassan R. O Donnell, WENDY Schneider    RESULTS:  You will receive a phone call from your surgeon or office staff with your pathology results.  Occasionally special testing must be done on the surgical specimen which may delay the posting of your final pathology report.  You may call for your final pathology report after 1p.m. three working days after surgery to check on its status.    INCISIONAL CARE:  If you have a dressing in place, keep clean and dry for 48 hours; you may replace the gauze if it becomes soiled.  After 48 hours you may remove the dressing and shower.  Do not submerse incision in water for 1 week.  If you have a Dermabond dressing (a type of skin glue), you may shower immediately.  Sutures will absorb and do not need to be removed.  If present, leave the steri-strips (white paper tapes) in place for 14 days after surgery.  If present, leave Dermabond glue in place until it wears/flakes off.  You may expect a small amount of drainage from your incision.  A lump/ridge under the incision is normal and will gradually resolve.    SUPPORT:  Wear a bra for support and comfort for 7 days, day and night.    ACTIVITY:  Cautiously resume exercise and strenuous activities such as jogging, tennis, aerobics, etc. Also, be careful of stretching activities with operative side for two weeks.    DIET:  Start with liquids and gradually resume your regular diet as tolerated.  Increased fluid intake is recommended. While taking pain medications, consider use of a stool softener, increase your fiber in your diet, or add a fiber supplement (like Metamucil, Citrucel) to help prevent constipation - a possible side effect of pain medications.    DISCOMFORT:  Local anesthetic placed at surgery should provide relief for 4-8 hours.  Begin taking pain pills before discomfort is severe.  Take the pain medication with some food, when possible, to  minimize side effects.  Intermittent use of ice packs may help during the first 1-3 weeks after surgery.  Expect gradual improvement.    Over-the-counter anti-inflammatory medications (i.e. Ibuprofen/Advil/Motrin or Naprosyn/Aleve) may be used per package instructions in addition to or while tapering off the narcotic pain medications to decrease swelling and sensitivity.  DO NOT TAKE these Anti-inflammatory medications if your primary physician has advised against doing so, or if you have acid reflux, ulcer, or bleeding disorder, or take blood-thinner medications.  Call your primary physician or the surgery office if you have medication questions.      FOLLOW-UP AFTER SURGERY:  -Our office will contact you approximately 2-3 weeks after surgery to check on your progress and answer any questions you may have.  If you are doing well, you will not need to return for an office appointment.  If any concerns are identified over the phone, we will help you make an appointment to see a provider.    -If you have not received a phone call, have any questions or concerns, or would like to be seen, please call us at [phone].  We are located at: [address], [address]; [place] [address]    -CONTACT US IF THE FOLLOWING DEVELOPS:   1. A fever that is above 101     2. Increased redness, warmth, drainage, bleeding, or swelling.   3. Pain that is not relieved by rest/ice and your prescription.   4.  Increasing pain after 48 hours.   5. Drainage that is thick, cloudy, yellow, green or white.   6. Any other questions or concerns.      FREQUENTLY ASKED QUESTIONS:    Q:  How should my incision look?    A:  Normally your incision will appear slightly swollen with light redness directly along the incision itself as it heals.  It may feel like a bump or ridge as the healing/scarring happens, and over time (3-4 months) this bump or ridge feeling should slowly go away.  In general, clear or pink watery drainage can be normal at  first as your incision heals, but should decrease over time.    Q:  How do I know if my incision is infected?  A:  Look at your incision for signs of infection, like redness around the incision spreading to surrounding skin, or drainage of cloudy or foul-smelling drainage.  If you feel warm, check your temperature to see if you are running a fever.    **If any of these things occur, please notify the nurse at our office.  We may need you to come into the office for an incision check.      Q:  How do I take care of my incision?  A:  If you have a dressing in place - Starting the day after surgery, replace the dressing 1-2 times a day until there is no further drainage from the incision.  At that time, a dressing is no longer needed.  Try to minimize tape on the skin if irritation is occurring at the tape sites.  If you have significant irritation from tape on the skin, please call the office to discuss other method of dressing your incision.    Small pieces of tape called  steri-strips  may be present directly overlying your incision; these may be removed 10 days after surgery unless otherwise specified by your surgeon.  If these tapes start to loosen at the ends, you may trim them back until they fall off or are removed.    A:  If you had  Dermabond  tissue glue used as a dressing (this causes your incision to look shiny with a clear covering over it) - This type of dressing wears off with time and does not require more dressings over the top unless it is draining around the glue as it wears off.  Do not apply ointments or lotions over the incisions until the glue has completely worn off.    Q:  There is a piece of tape or a sticky  lead  still on my skin.  Can I remove this?  A:  Sometimes the sticky  leads  used for monitoring during surgery or for evaluation in the emergency department are not all removed while you are in the hospital.  These sometimes have a tab or metal dot on them.  You can easily remove these  on your own, like taking off a band-aid.  If there is a gel substance under the  lead , simply wipe/clean it off with a washcloth or paper towel.      Q:  What can I do to minimize constipation (very hard stools, or lack of stools)?  A:  Stay well hydrated.  Increase your dietary fiber intake or take a fiber supplement -with plenty of water.  Walk around frequently.  You may consider an over-the-counter stool-softener.  Your Pharmacist can assist you with choosing one that is stocked at your pharmacy.  Constipation is also one of the most common side effects of pain medication.  If you are using pain medication, be pro-active and try to PREVENT problems with constipation by taking the steps above BEFORE constipation becomes a problem.    Q:  What do I do if I need more pain medications?  A:  Call the office to receive refills.  Be aware that certain pain meds cannot be called into a pharmacy and actually require a paper prescription.  A change may be made in your pain med as you progress thru your recovery period or if you have side effects to certain meds.    --Pain meds are NOT refilled after 5pm on weekdays, and NOT AT ALL on the weekends, so please look ahead to prevent problems.    Q:  Why am I having a hard time sleeping now that I am at home?  A:  Many medications you receive while you are in the hospital can impact your sleep for a number of days after your surgery/hospitalization.  Decreased level of activity and naps during the day may also make sleeping at night difficult.  Try to minimize day-time naps, and get up frequently during the day to walk around your home during your recovery time.  Sleep aides may be of some help, but are not recommended for long-term use.      Q:  I am having some back discomfort.  What should I do?  A:  This may be related to certain positioning that was required for your surgery, extended periods of time in bed, or other changes in your overall activity level.  You may try  ice, heat, acetaminophen, or ibuprofen to treat this temporarily.  Note that many pain medications have acetaminophen in them and would state this on the prescription bottle.  Be sure not to exceed the maximum of 4000mg per day of acetaminophen.     **If the pain you are having does not resolve, is severe, or is a flare of back pain you have had on other occasions prior to surgery, please contact your primary physician for further recommendations or for an appointment to be examined at their office.    Q:  Why am I having headaches?  A:  Headaches can be caused by many things:  caffeine withdrawal, use of pain meds, dehydration, high blood pressure, lack of sleep, over-activity/exhaustion, flare-up of usual migraine headaches.  If you feel this is related to muscle tension (a band-like feeling around the head, or a pressure at the low-back of the head) you may try ice or heat to this area.  You may need to drink more fluids (try electrolyte drink like Gatorade), rest, or take your usual migraine medications.   **If your headaches do not resolve, worsen, are accompanied by other symptoms, or if your blood pressure is high, please call your primary physician for recommendation and/or examination.    Q:  I am unable to urinate.  What do I do?  A:  A small percentage of people can have difficulty urinating initially after surgery.  This includes being able to urinate only a very small amount at a time and feeling discomfort or pressure in the very low abdomen.  This is called  urinary retention , and is actually an urgent situation.  Proceed to your nearest Emergency department for evaluation (not an Urgent Care Center).  Sometimes the bladder does not work correctly after certain medications you receive during surgery, or related to certain procedures.  You may need to have a catheter placed until your bladder recovers.  When planning to go to an Emergency department, it may help to call the ER to let them know you are  coming in for this problem after a surgery.  This may help you get in quicker to be evaluated.  **If you have symptoms of a urinary tract infection, please contact your primary physician for the proper evaluation and treatment.    If you have other questions, please call the office Monday thru Friday between 8am and 4:30pm to discuss with the nurse or physician assistant.  #[phone]    There is a surgeon ON CALL on weekday evenings and over the weekend in case of urgent need only, and may be contacted at the same number.    If you are having an emergency, call 911 or proceed to your nearest emergency department.     GENERAL ANESTHESIA OR SEDATION ADULT DISCHARGE INSTRUCTIONS   SPECIAL PRECAUTIONS FOR 24 HOURS AFTER SURGERY    IT IS NOT UNUSUAL TO FEEL LIGHT-HEADED OR FAINT, UP TO 24 HOURS AFTER SURGERY OR WHILE TAKING PAIN MEDICATION.  IF YOU HAVE THESE SYMPTOMS; SIT FOR A FEW MINUTES BEFORE STANDING AND HAVE SOMEONE ASSIST YOU WHEN YOU GET UP TO WALK OR USE THE BATHROOM.    YOU SHOULD REST AND RELAX FOR THE NEXT 24 HOURS AND YOU MUST MAKE ARRANGEMENTS TO HAVE SOMEONE STAY WITH YOU FOR AT LEAST 24 HOURS AFTER YOUR DISCHARGE.  AVOID HAZARDOUS AND STRENUOUS ACTIVITIES.  DO NOT MAKE IMPORTANT DECISIONS FOR 24 HOURS.    DO NOT DRIVE ANY VEHICLE OR OPERATE MECHANICAL EQUIPMENT FOR 24 HOURS FOLLOWING THE END OF YOUR SURGERY.  EVEN THOUGH YOU MAY FEEL NORMAL, YOUR REACTIONS MAY BE AFFECTED BY THE MEDICATION YOU HAVE RECEIVED.    DO NOT DRINK ALCOHOLIC BEVERAGES FOR 24 HOURS FOLLOWING YOUR SURGERY.    DRINK CLEAR LIQUIDS (APPLE JUICE, GINGER ALE, 7-UP, BROTH, ETC.).  PROGRESS TO YOUR REGULAR DIET AS YOU FEEL ABLE.    YOU MAY HAVE A DRY MOUTH, A SORE THROAT, MUSCLES ACHES OR TROUBLE SLEEPING.  THESE SHOULD GO AWAY AFTER 24 HOURS.    CALL YOUR DOCTOR FOR ANY OF THE FOLLOWING:  SIGNS OF INFECTION (FEVER, GROWING TENDERNESS AT THE SURGERY SITE, A LARGE AMOUNT OF DRAINAGE OR BLEEDING, SEVERE PAIN, FOUL-SMELLING DRAINAGE,  REDNESS OR SWELLING.    IT HAS BEEN OVER 8 TO 10 HOURS SINCE SURGERY AND YOU ARE STILL NOT ABLE TO URINATE (PASS WATER).

## 2022-07-14 NOTE — ANESTHESIA PROCEDURE NOTES
Airway       Patient location during procedure: OR  Staff -        CRNA: Amanda Vega APRN CRNA       Performed By: CRNA  Consent for Airway        Urgency: elective  Indications and Patient Condition       Indications for airway management: sophie-procedural       Induction type:intravenous       Mask difficulty assessment: 1 - vent by mask    Final Airway Details       Final airway type: supraglottic airway    Supraglottic Airway Details        Type: LMA       Brand: I-Gel       LMA size: 4    Post intubation assessment        Placement verified by: capnometry, equal breath sounds and chest rise        Number of attempts at approach: 1       Number of other approaches attempted: 0       Secured with: commercial tube malhotra       Ease of procedure: easy       Dentition: Intact and Unchanged

## 2022-07-14 NOTE — ANESTHESIA POSTPROCEDURE EVALUATION
Patient: Janelle Bhat    Procedure: Procedure(s):  Right breast excisional biopsy with localization       Anesthesia Type:  General    Note:  Disposition: Outpatient   Postop Pain Control: Uneventful            Sign Out: Well controlled pain   PONV: No   Neuro/Psych: Uneventful            Sign Out: Acceptable/Baseline neuro status   Airway/Respiratory: Uneventful            Sign Out: Acceptable/Baseline resp. status   CV/Hemodynamics: Uneventful            Sign Out: Acceptable CV status; No obvious hypovolemia; No obvious fluid overload   Other NRE: NONE   DID A NON-ROUTINE EVENT OCCUR? No           Last vitals:  Vitals Value Taken Time   /84 07/14/22 1115   Temp 97.9  F (36.6  C) 07/14/22 1050   Pulse 76 07/14/22 1117   Resp 17 07/14/22 1117   SpO2 93 % 07/14/22 1109   Vitals shown include unvalidated device data.    Electronically Signed By: Luiza Carlisle MD  July 14, 2022  12:56 PM

## 2022-07-15 LAB
PATH REPORT.COMMENTS IMP SPEC: NORMAL
PATH REPORT.COMMENTS IMP SPEC: NORMAL
PATH REPORT.FINAL DX SPEC: NORMAL
PATH REPORT.GROSS SPEC: NORMAL
PATH REPORT.MICROSCOPIC SPEC OTHER STN: NORMAL
PATH REPORT.RELEVANT HX SPEC: NORMAL
PHOTO IMAGE: NORMAL

## 2022-07-19 NOTE — RESULT ENCOUNTER NOTE
I called the patient with these results.  I recommended that she consider an oncology consult to deal with risk reduction strategies in the future, in spite of no malignant findings.  Patient states that she will consider this and let us know if she would like information.  I did also offer her an in person follow-up to further discuss this.

## 2022-07-21 ENCOUNTER — DOCUMENTATION ONLY (OUTPATIENT)
Dept: OTHER | Facility: CLINIC | Age: 67
End: 2022-07-21

## 2022-09-24 ENCOUNTER — HEALTH MAINTENANCE LETTER (OUTPATIENT)
Age: 67
End: 2022-09-24

## 2022-10-21 ENCOUNTER — LAB (OUTPATIENT)
Dept: LAB | Facility: CLINIC | Age: 67
End: 2022-10-21
Payer: COMMERCIAL

## 2022-10-21 DIAGNOSIS — E11.9 TYPE 2 DIABETES MELLITUS WITHOUT COMPLICATION, WITHOUT LONG-TERM CURRENT USE OF INSULIN (H): ICD-10-CM

## 2022-10-21 LAB — HBA1C MFR BLD: 6.3 % (ref 0–5.6)

## 2022-10-21 PROCEDURE — 83036 HEMOGLOBIN GLYCOSYLATED A1C: CPT

## 2022-10-21 PROCEDURE — 36415 COLL VENOUS BLD VENIPUNCTURE: CPT

## 2022-10-22 NOTE — RESULT ENCOUNTER NOTE
Dear Janelle Dickinson,    Here is a summary of your recent test results:    -A1C (test of diabetes control the last 2-3 months) is at your goal. Please continue with your current plan.     For additional lab test information, [email or web address] is an excellent reference.    In addition, here is a list of due or overdue Health Maintenance reminders:    Pneumococcal Vaccine(1 - PCV) Never done  Diptheria Tetanus Pertussis (DTAP/TDAP/TD) Vaccine(1 - Tdap) Never done  Eye Exam due on 06/30/2022    Please call us at [phone] (or use [facility]) to address the above recommendations if needed.    Thank you for choosing [facility].  It was an honor and a privilege to participate in your care.       Healthy regards,    Georgina Caputo, ROSS  [facility]

## 2022-11-16 NOTE — RESULT ENCOUNTER NOTE
BIOPSY scheduled.     Georgina Caputo, ROSS  [facility]
[FreeTextEntry1] : pimple on top of nose x 1 wk, not healing and now hurting \par has been using 3 different nasal sprays prescribed by ENT for eustacian tube swelling\par after flight\par

## 2023-01-10 ENCOUNTER — OFFICE VISIT (OUTPATIENT)
Dept: OPTOMETRY | Facility: CLINIC | Age: 68
End: 2023-01-10
Payer: COMMERCIAL

## 2023-01-10 DIAGNOSIS — H52.4 PRESBYOPIA: ICD-10-CM

## 2023-01-10 DIAGNOSIS — H52.203 MYOPIA OF BOTH EYES WITH ASTIGMATISM: ICD-10-CM

## 2023-01-10 DIAGNOSIS — H52.13 MYOPIA OF BOTH EYES WITH ASTIGMATISM: ICD-10-CM

## 2023-01-10 DIAGNOSIS — E11.9 TYPE 2 DIABETES MELLITUS WITHOUT RETINOPATHY (H): Primary | ICD-10-CM

## 2023-01-10 PROCEDURE — 92015 DETERMINE REFRACTIVE STATE: CPT | Performed by: OPTOMETRIST

## 2023-01-10 PROCEDURE — 92014 COMPRE OPH EXAM EST PT 1/>: CPT | Performed by: OPTOMETRIST

## 2023-01-10 ASSESSMENT — REFRACTION_MANIFEST
OS_AXIS: 035
METHOD_AUTOREFRACTION: 1
OS_CYLINDER: +1.75
OD_AXIS: 169
OD_AXIS: 165
OS_AXIS: 030
OD_SPHERE: -3.25
OS_SPHERE: -4.00
OS_SPHERE: -4.00
OD_CYLINDER: +1.75
OD_CYLINDER: +1.00
OD_SPHERE: -3.50
OS_CYLINDER: +1.50

## 2023-01-10 ASSESSMENT — VISUAL ACUITY
OS_CC: 20/20-3
OD_CC: 20/30
CORRECTION_TYPE: GLASSES
METHOD: SNELLEN - LINEAR
OS_SC: 20/200
OD_SC: 20/200
OD_CC: 20/20
OS_CC: 20/20
OD_CC+: -2

## 2023-01-10 ASSESSMENT — CONF VISUAL FIELD
OS_INFERIOR_TEMPORAL_RESTRICTION: 0
OD_SUPERIOR_TEMPORAL_RESTRICTION: 0
OD_INFERIOR_NASAL_RESTRICTION: 0
OD_SUPERIOR_NASAL_RESTRICTION: 0
OD_NORMAL: 1
OS_SUPERIOR_TEMPORAL_RESTRICTION: 0
OD_INFERIOR_TEMPORAL_RESTRICTION: 0
OS_INFERIOR_NASAL_RESTRICTION: 0
OS_SUPERIOR_NASAL_RESTRICTION: 0
METHOD: COUNTING FINGERS
OS_NORMAL: 1

## 2023-01-10 ASSESSMENT — REFRACTION_WEARINGRX
OD_ADD: +2.50
OS_AXIS: 047
OS_CYLINDER: +1.50
OD_CYLINDER: +1.00
OS_ADD: +2.50
OD_SPHERE: -3.25
OS_SPHERE: -4.00
OD_AXIS: 159
SPECS_TYPE: PAL

## 2023-01-10 ASSESSMENT — KERATOMETRY
OD_K1POWER_DIOPTERS: 47.25
OD_AXISANGLE_DEGREES: 103
OD_AXISANGLE2_DEGREES: 13
OD_K2POWER_DIOPTERS: 47.62
OS_AXISANGLE_DEGREES: 48
OS_K1POWER_DIOPTERS: 16.50
OS_AXISANGLE2_DEGREES: 138
OS_K2POWER_DIOPTERS: 48.50

## 2023-01-10 ASSESSMENT — TONOMETRY
OS_IOP_MMHG: 20
OD_IOP_MMHG: 20
IOP_METHOD: APPLANATION

## 2023-01-10 ASSESSMENT — EXTERNAL EXAM - LEFT EYE: OS_EXAM: NORMAL

## 2023-01-10 ASSESSMENT — CUP TO DISC RATIO
OS_RATIO: 0.1
OD_RATIO: 0.1

## 2023-01-10 ASSESSMENT — EXTERNAL EXAM - RIGHT EYE: OD_EXAM: NORMAL

## 2023-01-10 NOTE — PATIENT INSTRUCTIONS
Patient Education   Diabetes weakens the blood vessels all over the body, including the eyes. Damage to the blood vessels in the eyes can cause swelling or bleeding into part of the eye (called the retina). This is called diabetic retinopathy (SG-tin-AH-pu-thee). If not treated, this disease can cause vision loss or blindness.   Symptoms may include blurred or distorted vision, but many people have no symptoms. It's important to see your eye doctor regularly to check for problems.   Early treatment and good control can help protect your vision. Here are the things you can do to help prevent vision loss:      1. Keep your blood sugar levels under tight control.      2. Bring high blood pressure under control.      3. No smoking.      4. Have yearly dilated eye exams.    No retinopathy on today's visit, no change needed in prescription

## 2023-01-10 NOTE — PROGRESS NOTES
Chief Complaint   Patient presents with     Diabetic Eye Exam         Lab Results   Component Value Date    A1C 6.3 10/21/2022    A1C 6.3 02/01/2022    A1C 6.3 08/17/2021    A1C 6.3 12/21/2020    A1C 6.4 06/18/2020    A1C 6.4 10/25/2019    A1C 6.6 05/02/2019            Last Eye Exam: 2021  Dilated Previously: Yes, side effects of dilation explained today    What are you currently using to see?  glasses    Distance Vision Acuity: Satisfied with vision    Near Vision Acuity: Satisfied with vision while reading and using computer with glasses    Eye Comfort: good  Do you use eye drops? : No        Alina Gruber - Optometric Assistant      Medical, surgical and family histories reviewed and updated 1/10/2023.       OBJECTIVE: See Ophthalmology exam    ASSESSMENT:    ICD-10-CM    1. Type 2 diabetes mellitus without retinopathy (H)  E11.9       2. Myopia of both eyes with astigmatism  H52.13     H52.203       3. Presbyopia  H52.4           PLAN:  Glucose control   No prescription change  Janelle Bhat aware  eye exam results will be sent to Georgina Caputo.    Tonie Matthews OD

## 2023-01-10 NOTE — LETTER
1/10/2023         RE: Janelle Bhat  [address] [address]        Dear Colleague,    Thank you for referring your patient, Janelle Bhat, to the [facility]. Please see a copy of my visit note below.    Chief Complaint   Patient presents with     Diabetic Eye Exam         Lab Results   Component Value Date    A1C 6.3 10/21/2022    A1C 6.3 02/01/2022    A1C 6.3 08/17/2021    A1C 6.3 12/21/2020    A1C 6.4 06/18/2020    A1C 6.4 10/25/2019    A1C 6.6 05/02/2019            Last Eye Exam: 2021  Dilated Previously: Yes, side effects of dilation explained today    What are you currently using to see?  glasses    Distance Vision Acuity: Satisfied with vision    Near Vision Acuity: Satisfied with vision while reading and using computer with glasses    Eye Comfort: good  Do you use eye drops? : No        Alina Traceyor - Optometric Assistant      Medical, surgical and family histories reviewed and updated 1/10/2023.       OBJECTIVE: See Ophthalmology exam    ASSESSMENT:    ICD-10-CM    1. Type 2 diabetes mellitus without retinopathy (H)  E11.9       2. Myopia of both eyes with astigmatism  H52.13     H52.203       3. Presbyopia  H52.4           PLAN:  Glucose control   No prescription change  Janelle Bhat aware  eye exam results will be sent to Georgina Caputo.    Tonie Matthews OD           Again, thank you for allowing me to participate in the care of your patient.        Sincerely,        Tonie Matthews, OD

## 2023-01-16 DIAGNOSIS — E78.5 HYPERLIPIDEMIA LDL GOAL <100: ICD-10-CM

## 2023-01-16 DIAGNOSIS — I10 HYPERTENSION GOAL BP (BLOOD PRESSURE) < 130/80: ICD-10-CM

## 2023-01-20 RX ORDER — LISINOPRIL AND HYDROCHLOROTHIAZIDE 12.5; 2 MG/1; MG/1
2 TABLET ORAL DAILY
Qty: 180 TABLET | Refills: 0 | Status: SHIPPED | OUTPATIENT
Start: 2023-01-20 | End: 2023-03-24

## 2023-01-20 RX ORDER — ATORVASTATIN CALCIUM 40 MG/1
40 TABLET, FILM COATED ORAL DAILY
Qty: 90 TABLET | Refills: 3 | Status: SHIPPED | OUTPATIENT
Start: 2023-01-20 | End: 2023-01-20

## 2023-01-20 RX ORDER — ATORVASTATIN CALCIUM 40 MG/1
40 TABLET, FILM COATED ORAL DAILY
Qty: 90 TABLET | Refills: 0 | Status: SHIPPED | OUTPATIENT
Start: 2023-01-20 | End: 2023-03-24

## 2023-01-20 NOTE — TELEPHONE ENCOUNTER
Needs in person physical-I resent Lipitor with only 90 day supply(no refills) until seen physical labs etc.           Georgina Caputo, JUANP-BC

## 2023-03-23 ASSESSMENT — ENCOUNTER SYMPTOMS
EYE PAIN: 0
MYALGIAS: 0
BREAST MASS: 0
COUGH: 0
ABDOMINAL PAIN: 0
FREQUENCY: 0
NAUSEA: 0
DIARRHEA: 0
FEVER: 0
CHILLS: 0
DIZZINESS: 0
SORE THROAT: 0
HEMATOCHEZIA: 0
JOINT SWELLING: 0
HEARTBURN: 0
NERVOUS/ANXIOUS: 0
WEAKNESS: 0
PALPITATIONS: 0
HEADACHES: 0
SHORTNESS OF BREATH: 0
PARESTHESIAS: 0
DYSURIA: 0
CONSTIPATION: 0
HEMATURIA: 0
ARTHRALGIAS: 0

## 2023-03-23 ASSESSMENT — ACTIVITIES OF DAILY LIVING (ADL): CURRENT_FUNCTION: NO ASSISTANCE NEEDED

## 2023-03-24 ENCOUNTER — OFFICE VISIT (OUTPATIENT)
Dept: FAMILY MEDICINE | Facility: CLINIC | Age: 68
End: 2023-03-24
Payer: COMMERCIAL

## 2023-03-24 VITALS
HEIGHT: 64 IN | RESPIRATION RATE: 16 BRPM | SYSTOLIC BLOOD PRESSURE: 130 MMHG | WEIGHT: 201.2 LBS | HEART RATE: 68 BPM | DIASTOLIC BLOOD PRESSURE: 76 MMHG | BODY MASS INDEX: 34.35 KG/M2 | TEMPERATURE: 96.4 F | OXYGEN SATURATION: 100 %

## 2023-03-24 DIAGNOSIS — Z23 NEED FOR PNEUMOCOCCAL VACCINATION: ICD-10-CM

## 2023-03-24 DIAGNOSIS — D24.1 PAPILLOMA OF RIGHT BREAST: ICD-10-CM

## 2023-03-24 DIAGNOSIS — I10 HYPERTENSION GOAL BP (BLOOD PRESSURE) < 130/80: ICD-10-CM

## 2023-03-24 DIAGNOSIS — R92.30 DENSE BREASTS: ICD-10-CM

## 2023-03-24 DIAGNOSIS — E78.5 HYPERLIPIDEMIA LDL GOAL <100: ICD-10-CM

## 2023-03-24 DIAGNOSIS — E11.9 TYPE 2 DIABETES MELLITUS WITHOUT COMPLICATION, WITHOUT LONG-TERM CURRENT USE OF INSULIN (H): ICD-10-CM

## 2023-03-24 DIAGNOSIS — Z00.00 ENCOUNTER FOR MEDICARE ANNUAL WELLNESS EXAM: Primary | ICD-10-CM

## 2023-03-24 DIAGNOSIS — Z13.0 SCREENING FOR DEFICIENCY ANEMIA: ICD-10-CM

## 2023-03-24 DIAGNOSIS — Z12.31 ENCOUNTER FOR SCREENING MAMMOGRAM FOR BREAST CANCER: ICD-10-CM

## 2023-03-24 LAB
ALBUMIN SERPL BCG-MCNC: 4.5 G/DL (ref 3.5–5.2)
ALP SERPL-CCNC: 55 U/L (ref 35–104)
ALT SERPL W P-5'-P-CCNC: 9 U/L (ref 10–35)
ANION GAP SERPL CALCULATED.3IONS-SCNC: 15 MMOL/L (ref 7–15)
AST SERPL W P-5'-P-CCNC: 22 U/L (ref 10–35)
BILIRUB SERPL-MCNC: 0.3 MG/DL
BUN SERPL-MCNC: 11.5 MG/DL (ref 8–23)
CALCIUM SERPL-MCNC: 9.6 MG/DL (ref 8.8–10.2)
CHLORIDE SERPL-SCNC: 93 MMOL/L (ref 98–107)
CHOLEST SERPL-MCNC: 128 MG/DL
CREAT SERPL-MCNC: 0.64 MG/DL (ref 0.51–0.95)
CREAT UR-MCNC: 83.1 MG/DL
DEPRECATED HCO3 PLAS-SCNC: 26 MMOL/L (ref 22–29)
ERYTHROCYTE [DISTWIDTH] IN BLOOD BY AUTOMATED COUNT: 12.9 % (ref 10–15)
GFR SERPL CREATININE-BSD FRML MDRD: >90 ML/MIN/1.73M2
GLUCOSE SERPL-MCNC: 80 MG/DL (ref 70–99)
HBA1C MFR BLD: 6.3 % (ref 0–5.6)
HCT VFR BLD AUTO: 36.7 % (ref 35–47)
HDLC SERPL-MCNC: 57 MG/DL
HGB BLD-MCNC: 12 G/DL (ref 11.7–15.7)
LDLC SERPL CALC-MCNC: 57 MG/DL
MCH RBC QN AUTO: 29 PG (ref 26.5–33)
MCHC RBC AUTO-ENTMCNC: 32.7 G/DL (ref 31.5–36.5)
MCV RBC AUTO: 89 FL (ref 78–100)
MICROALBUMIN UR-MCNC: <12 MG/L
MICROALBUMIN/CREAT UR: NORMAL MG/G{CREAT}
NONHDLC SERPL-MCNC: 71 MG/DL
PLATELET # BLD AUTO: 285 10E3/UL (ref 150–450)
POTASSIUM SERPL-SCNC: 4 MMOL/L (ref 3.4–5.3)
PROT SERPL-MCNC: 7.1 G/DL (ref 6.4–8.3)
RBC # BLD AUTO: 4.14 10E6/UL (ref 3.8–5.2)
SODIUM SERPL-SCNC: 134 MMOL/L (ref 136–145)
TRIGL SERPL-MCNC: 70 MG/DL
TSH SERPL DL<=0.005 MIU/L-ACNC: 1.7 UIU/ML (ref 0.3–4.2)
VIT B12 SERPL-MCNC: 311 PG/ML (ref 232–1245)
WBC # BLD AUTO: 7 10E3/UL (ref 4–11)

## 2023-03-24 PROCEDURE — 36415 COLL VENOUS BLD VENIPUNCTURE: CPT | Performed by: NURSE PRACTITIONER

## 2023-03-24 PROCEDURE — 85027 COMPLETE CBC AUTOMATED: CPT | Performed by: NURSE PRACTITIONER

## 2023-03-24 PROCEDURE — 99207 PR FOOT EXAM NO CHARGE: CPT | Mod: 25 | Performed by: NURSE PRACTITIONER

## 2023-03-24 PROCEDURE — G0439 PPPS, SUBSEQ VISIT: HCPCS | Performed by: NURSE PRACTITIONER

## 2023-03-24 PROCEDURE — 99214 OFFICE O/P EST MOD 30 MIN: CPT | Mod: 25 | Performed by: NURSE PRACTITIONER

## 2023-03-24 PROCEDURE — 80053 COMPREHEN METABOLIC PANEL: CPT | Performed by: NURSE PRACTITIONER

## 2023-03-24 PROCEDURE — 82607 VITAMIN B-12: CPT | Performed by: NURSE PRACTITIONER

## 2023-03-24 PROCEDURE — G0009 ADMIN PNEUMOCOCCAL VACCINE: HCPCS | Performed by: NURSE PRACTITIONER

## 2023-03-24 PROCEDURE — 80061 LIPID PANEL: CPT | Performed by: NURSE PRACTITIONER

## 2023-03-24 PROCEDURE — 83036 HEMOGLOBIN GLYCOSYLATED A1C: CPT | Performed by: NURSE PRACTITIONER

## 2023-03-24 PROCEDURE — 90677 PCV20 VACCINE IM: CPT | Performed by: NURSE PRACTITIONER

## 2023-03-24 PROCEDURE — 82570 ASSAY OF URINE CREATININE: CPT | Performed by: NURSE PRACTITIONER

## 2023-03-24 PROCEDURE — 82043 UR ALBUMIN QUANTITATIVE: CPT | Performed by: NURSE PRACTITIONER

## 2023-03-24 PROCEDURE — 84443 ASSAY THYROID STIM HORMONE: CPT | Performed by: NURSE PRACTITIONER

## 2023-03-24 RX ORDER — AMLODIPINE BESYLATE 2.5 MG/1
2.5 TABLET ORAL EVERY MORNING
Qty: 90 TABLET | Refills: 3 | Status: SHIPPED | OUTPATIENT
Start: 2023-03-24 | End: 2024-04-18

## 2023-03-24 RX ORDER — METFORMIN HCL 500 MG
2000 TABLET, EXTENDED RELEASE 24 HR ORAL
Qty: 360 TABLET | Refills: 3 | Status: SHIPPED | OUTPATIENT
Start: 2023-03-24 | End: 2024-04-09

## 2023-03-24 RX ORDER — LISINOPRIL AND HYDROCHLOROTHIAZIDE 12.5; 2 MG/1; MG/1
2 TABLET ORAL DAILY
Qty: 180 TABLET | Refills: 3 | Status: SHIPPED | OUTPATIENT
Start: 2023-03-24 | End: 2024-04-18

## 2023-03-24 RX ORDER — ATORVASTATIN CALCIUM 40 MG/1
40 TABLET, FILM COATED ORAL DAILY
Qty: 90 TABLET | Refills: 3 | Status: SHIPPED | OUTPATIENT
Start: 2023-03-24 | End: 2024-04-01

## 2023-03-24 RX ORDER — LANCETS
EACH MISCELLANEOUS
Qty: 100 EACH | Refills: 3 | Status: SHIPPED | OUTPATIENT
Start: 2023-03-24

## 2023-03-24 ASSESSMENT — ENCOUNTER SYMPTOMS
COUGH: 0
CHILLS: 0
FEVER: 0
PALPITATIONS: 0
FREQUENCY: 0
ABDOMINAL PAIN: 0
NAUSEA: 0
WEAKNESS: 0
ARTHRALGIAS: 0
HEADACHES: 0
PARESTHESIAS: 0
MYALGIAS: 0
SORE THROAT: 0
HEMATOCHEZIA: 0
HEMATURIA: 0
DIARRHEA: 0
DYSURIA: 0
BREAST MASS: 0
EYE PAIN: 0
CONSTIPATION: 0
DIZZINESS: 0
JOINT SWELLING: 0
SHORTNESS OF BREATH: 0
HEARTBURN: 0
NERVOUS/ANXIOUS: 0

## 2023-03-24 ASSESSMENT — ACTIVITIES OF DAILY LIVING (ADL): CURRENT_FUNCTION: NO ASSISTANCE NEEDED

## 2023-03-24 NOTE — PROGRESS NOTES
"SUBJECTIVE:   Janelle Dickinson is a 67 year old who presents for Preventive Visit.  No flowsheet data found.Patient has been advised of split billing requirements and indicates understanding: Yes  Are you in the first 12 months of your Medicare coverage?  No    Healthy Habits:     In general, how would you rate your overall health?  Good    Frequency of exercise:  2-3 days/week    Duration of exercise:  15-30 minutes    Do you usually eat at least 4 servings of fruit and vegetables a day, include whole grains    & fiber and avoid regularly eating high fat or \"junk\" foods?  Yes    Taking medications regularly:  Yes    Medication side effects:  None    Ability to successfully perform activities of daily living:  No assistance needed    Home Safety:  No safety concerns identified    Hearing Impairment:  No hearing concerns    In the past 6 months, have you been bothered by leaking of urine?  No    In general, how would you rate your overall mental or emotional health?  Excellent      PHQ-2 Total Score: 0    Additional concerns today:  No    Has right breast papilloma.   Saw surgeon pathology had no malignancy; surgeon recommended onc follow up.   Assessment and plan: I have recommended excisional biopsy in order to rule out adjacent malignancy or atypia.  We discussed the procedure, along with its risks and complications, in detail.  The patient has agreed to proceed.  We will work on getting this arranged for her with RFID localization in the near future.  IMPRESSION: Successful placement of a localizer for operative  guidance.    I called the patient with these results.  I recommended that she consider an oncology consult to deal with risk reduction strategies in the future, in spite of no malignant findings.  Patient states that she will consider this and let us know if she would like information.  I did also offer her an in person follow-up to further discuss this.  Wt Readings from Last 5 Encounters:   03/24/23 91.3 kg " (201 lb 3.2 oz)   07/14/22 91.2 kg (201 lb)   07/11/22 91.2 kg (201 lb)   06/27/22 92.1 kg (203 lb)   02/01/22 92.1 kg (203 lb)       Shingles      Diabetes Follow-up    How often are you checking your blood sugar? A few times a week  What time of day are you checking your blood sugars (select all that apply)?  Before and after meals and At bedtime  Have you had any blood sugars above 200?  No  Have you had any blood sugars below 70?  No    What symptoms do you notice when your blood sugar is low?  Other: felt a little sick    What concerns do you have today about your diabetes? None     Do you have any of these symptoms? (Select all that apply)  No numbness or tingling in feet.  No redness, sores or blisters on feet.  No complaints of excessive thirst.  No reports of blurry vision.  No significant changes to weight.      Hyperlipidemia Follow-Up      Are you regularly taking any medication or supplement to lower your cholesterol?   Yes- Atorvastatin    Are you having muscle aches or other side effects that you think could be caused by your cholesterol lowering medication?  No    Hypertension Follow-up      Do you check your blood pressure regularly outside of the clinic?  On occas    Are you following a low salt diet? No    Are your blood pressures ever more than 140 on the top number (systolic) OR more   than 90 on the bottom number (diastolic), for example 140/90? No    BP Readings from Last 2 Encounters:   03/24/23 130/76   07/14/22 125/68     Hemoglobin A1C (%)   Date Value   03/24/2023 6.3 (H)   10/21/2022 6.3 (H)   12/21/2020 6.3 (H)   06/18/2020 6.4 (H)     LDL Cholesterol Calculated (mg/dL)   Date Value   03/24/2023 57   02/01/2022 52   12/21/2020 46   10/25/2019 56           Have you ever done Advance Care Planning? (For example, a Health Directive, POLST, or a discussion with a medical provider or your loved ones about your wishes): Yes, advance care planning is on file.      Fall risk  Fallen 2 or more  times in the past year?: No  Any fall with injury in the past year?: No  click delete button to remove this line now  Cognitive Screening   1) Repeat 3 items (Leader, Season, Table)    2) Clock draw: NORMAL  3) 3 item recall: Recalls 3 objects  Results: 3 items recalled: COGNITIVE IMPAIRMENT LESS LIKELY    Mini-CogTM Copyright ARISTEO Hernandez. Licensed by the author for use in [facility]; reprinted with permission (jennifer@[email or web address]). All rights reserved.      Do you have sleep apnea, excessive snoring or daytime drowsiness?: no    Reviewed and updated as needed this visit by clinical staff   Tobacco  Allergies  Meds  Problems  Med Hx  Surg Hx  Fam Hx          Reviewed and updated as needed this visit by Provider   Tobacco  Allergies  Meds  Problems  Med Hx  Surg Hx  Fam Hx         Social History     Tobacco Use     Smoking status: Never     Smokeless tobacco: Never   Vaping Use     Vaping status: Not on file   Substance Use Topics     Alcohol use: Yes     Comment: less than 1 drink per week             3/23/2023     9:14 AM   Alcohol Use   Prescreen: >3 drinks/day or >7 drinks/week? No          View : No data to display.              Do you have a current opioid prescription? No  Do you use any other controlled substances or medications that are not prescribed by a provider? None    Current providers sharing in care for this patient include:   Patient Care Team:  Georgina Caputo APRN CNP as PCP - General (Nurse Practitioner)  Georgina Caputo APRN CNP as Assigned PCP  Tonie Matthews OD as Assigned Surgical Provider    The following health maintenance items are reviewed in Epic and correct as of today:  Health Maintenance   Topic Date Due     DTAP/TDAP/TD IMMUNIZATION (1 - Tdap) 03/13/2024 (Originally 10/30/1980)     ZOSTER IMMUNIZATION (1 of 2) 03/20/2024 (Originally 10/30/2004)     A1C  09/24/2023     EYE EXAM  01/10/2024     MEDICARE ANNUAL WELLNESS VISIT  03/24/2024     CMP   03/24/2024     LIPID  03/24/2024     MICROALBUMIN  03/24/2024     TSH W/FREE T4 REFLEX  03/24/2024     DIABETIC FOOT EXAM  03/24/2024     ANNUAL REVIEW OF HM ORDERS  03/24/2024     FALL RISK ASSESSMENT  03/24/2024     CBC  03/24/2024     VITAMIN B12  03/24/2024     MAMMO SCREENING  06/09/2024     ADVANCE CARE PLANNING  03/24/2028     COLORECTAL CANCER SCREENING  06/01/2032     DEXA  05/27/2036     HEPATITIS C SCREENING  Completed     PHQ-2 (once per calendar year)  Completed     INFLUENZA VACCINE  Completed     Pneumococcal Vaccine: 65+ Years  Completed     COVID-19 Vaccine  Completed     IPV IMMUNIZATION  Aged Out     MENINGITIS IMMUNIZATION  Aged Out     BMP  Discontinued     Lab work is in process  Labs reviewed in EPIC  BP Readings from Last 3 Encounters:   03/24/23 130/76   07/14/22 125/68   07/11/22 136/84    Wt Readings from Last 3 Encounters:   03/24/23 91.3 kg (201 lb 3.2 oz)   07/14/22 91.2 kg (201 lb)   07/11/22 91.2 kg (201 lb)                  Patient Active Problem List   Diagnosis     Papilloma of right breast     Type 2 diabetes mellitus without complication, without long-term current use of insulin (H)     Hypertension goal BP (blood pressure) < 130/80     Hyperlipidemia LDL goal <100     Past Surgical History:   Procedure Laterality Date     BIOPSY BREAST Right 7/14/2022    Procedure: Right radiofrequency tag localized breast biopsy;  Surgeon: Reginaldo Miles MD;  Location: [facility] OR     COLONOSCOPY      2-3 times     GYN SURGERY  in [place]    ovaries removed Secondary to a Benign cyst. Has her Uterus.       Social History     Tobacco Use     Smoking status: Never     Smokeless tobacco: Never   Vaping Use     Vaping status: Not on file   Substance Use Topics     Alcohol use: Yes     Comment: less than 1 drink per week     Family History   Problem Relation Age of Onset     Diabetes Mother         type 2     Macular Degeneration Mother      Other Cancer Father         lung cancer +      "Hypertension Sister      Skin Cancer Sister      Coronary Artery Disease Maternal Grandfather         brain aneurysm     Cerebrovascular Disease Maternal Grandmother      Other Cancer Paternal Grandmother         esophagus?         Current Outpatient Medications   Medication Sig Dispense Refill     amLODIPine (NORVASC) 2.5 MG tablet Take 1 tablet (2.5 mg) by mouth every morning 90 tablet 3     atorvastatin (LIPITOR) 40 MG tablet Take 1 tablet (40 mg) by mouth daily 90 tablet 3     blood glucose (NO BRAND SPECIFIED) test strip Use to test blood sugar 1-3 times weekly or as directed. 100 strip 1     blood glucose (NO BRAND SPECIFIED) test strip Use to test blood sugar 2 times daily or as directed. To accompany: Blood Glucose Monitor Brands: per insurance.Currently has one touch ultra blue 100 strip 3     Calcium Carb-Cholecalciferol (CALCIUM 500+D3 PO) Take 1 capful by mouth daily       lisinopril-hydrochlorothiazide (ZESTORETIC) 20-12.5 MG tablet Take 2 tablets by mouth daily 180 tablet 3     metFORMIN (GLUCOPHAGE XR) 500 MG 24 hr tablet Take 4 tablets (2,000 mg) by mouth daily (with dinner) 4 tablets daily with dinner 360 tablet 3     thin (NO BRAND SPECIFIED) lancets Use to test blood sugar 2 times daily or as directed. To accompany: Blood Glucose Monitor Brands: per insurance.Currently one touch delica 33G lancets please 100 each 3     vitamin C (ASCORBIC ACID) 1000 MG TABS Take 1,000 mg by mouth daily       vitamin D3 (CHOLECALCIFEROL) 50 mcg (2000 units) tablet Take 1 tablet by mouth daily       ondansetron (ZOFRAN ODT) 4 MG ODT tab Take 1 tablet (4 mg) by mouth every 8 hours as needed for nausea 8 tablet 0     Allergies   Allergen Reactions     Cats      \"Asthmatic type\" reaction.           1/29/2022     9:32 AM   Breast CA Risk Assessment (FHS-7)   Do you have a family history of breast, colon, or ovarian cancer? No / Unknown       Mammogram Screening: Recommended mammography every 1 years with patient " "discussion and risk factor consideration  Pertinent mammograms are reviewed under the imaging tab.    Review of Systems   Constitutional: Negative for chills and fever.   HENT: Negative for congestion, ear pain, hearing loss and sore throat.    Eyes: Negative for pain and visual disturbance.   Respiratory: Negative for cough and shortness of breath.    Cardiovascular: Negative for chest pain, palpitations and peripheral edema.   Gastrointestinal: Negative for abdominal pain, constipation, diarrhea, heartburn, hematochezia and nausea.   Breasts:  Negative for tenderness, breast mass and discharge.   Genitourinary: Negative for dysuria, frequency, genital sores, hematuria, pelvic pain, urgency, vaginal bleeding and vaginal discharge.   Musculoskeletal: Negative for arthralgias, joint swelling and myalgias.   Skin: Negative for rash.   Neurological: Negative for dizziness, weakness, headaches and paresthesias.   Psychiatric/Behavioral: Negative for mood changes. The patient is not nervous/anxious.        OBJECTIVE:   /76   Pulse 68   Temp (!) 96.4  F (35.8  C) (Tympanic)   Resp 16   Ht 1.626 m (5' 4\")   Wt 91.3 kg (201 lb 3.2 oz)   SpO2 100%   BMI 34.54 kg/m   Estimated body mass index is 34.54 kg/m  as calculated from the following:    Height as of this encounter: 1.626 m (5' 4\").    Weight as of this encounter: 91.3 kg (201 lb 3.2 oz).  Physical Exam  GENERAL: healthy, alert and no distress  EYES: Eyes grossly normal to inspection, PERRL and conjunctivae and sclerae normal  HENT: ear canals and TM's normal, nose and mouth without ulcers or lesions  NECK: no adenopathy, no asymmetry, masses, or scars and thyroid normal to palpation  RESP: lungs clear to auscultation - no rales, rhonchi or wheezes  BREAST: normal without masses, tenderness or nipple discharge and no palpable axillary masses or adenopathy  CV: regular rate and rhythm, normal S1 S2, no S3 or S4, no murmur, click or rub, no peripheral edema " and peripheral pulses strong  ABDOMEN: soft, nontender, no hepatosplenomegaly, no masses and bowel sounds normal  MS: no gross musculoskeletal defects noted, no edema  SKIN: no suspicious lesions or rashes  NEURO: Normal strength and tone, mentation intact and speech normal  PSYCH: mentation appears normal, affect normal/bright    Diagnostic Test Results:  Labs reviewed in Epic    ASSESSMENT / PLAN:   Janelle Dickinson was seen today for physical.    Diagnoses and all orders for this visit:    Encounter for Medicare annual wellness exam  Well woman exam with breast exam completed today.    No concern of urinary incontinence.  Fasting labs today.    Will notify of lab results.    Hypertension goal BP (blood pressure) < 130/80  No concerns.  Stable.  Continue same medication this was refilled today.  -     COMPREHENSIVE METABOLIC PANEL; Future  -     amLODIPine (NORVASC) 2.5 MG tablet; Take 1 tablet (2.5 mg) by mouth every morning  -     lisinopril-hydrochlorothiazide (ZESTORETIC) 20-12.5 MG tablet; Take 2 tablets by mouth daily  -     COMPREHENSIVE METABOLIC PANEL    Type 2 diabetes mellitus without complication, without long-term current use of insulin (H)  No concerns.  Stable.  Continue same medication this was refilled today.  -     Lipid panel reflex to direct LDL Non-fasting; Future  -     Albumin Random Urine Quantitative with Creat Ratio; Future  -     TSH WITH FREE T4 REFLEX; Future  -     Vitamin B12; Future  -     blood glucose (NO BRAND SPECIFIED) test strip; Use to test blood sugar 1-3 times weekly or as directed.  -     metFORMIN (GLUCOPHAGE XR) 500 MG 24 hr tablet; Take 4 tablets (2,000 mg) by mouth daily (with dinner) 4 tablets daily with dinner  -     FOOT EXAM  -     blood glucose (NO BRAND SPECIFIED) test strip; Use to test blood sugar 2 times daily or as directed. To accompany: Blood Glucose Monitor Brands: per insurance.Currently has one touch ultra blue  -     thin (NO BRAND SPECIFIED) lancets; Use to  "test blood sugar 2 times daily or as directed. To accompany: Blood Glucose Monitor Brands: per insurance.Currently one touch delica 33G lancets please  -     Hemoglobin A1c; Future  -     Lipid panel reflex to direct LDL Non-fasting  -     Albumin Random Urine Quantitative with Creat Ratio  -     TSH WITH FREE T4 REFLEX  -     Vitamin B12  -     Hemoglobin A1c    Hyperlipidemia LDL goal <100  No concerns.  Stable.  Continue same medication this was refilled today.  -     atorvastatin (LIPITOR) 40 MG tablet; Take 1 tablet (40 mg) by mouth daily    Papilloma of right breast  Update mammo and oncology  prn.     Dense breasts  -     MA Screen Bilateral w/Joaquín; Future    Encounter for screening mammogram for breast cancer  -     MA Screen Bilateral w/Joaquín; Future    Need for pneumococcal vaccination  -     Pneumococcal 20 Valent Conjugate (PCV20)    Screening for deficiency anemia  -     CBC with Platelets; Future  -     CBC with Platelets    Other orders  -     REVIEW OF HEALTH MAINTENANCE PROTOCOL ORDERS        Patient has been advised of split billing requirements and indicates understanding: Yes      COUNSELING:  Reviewed preventive health counseling, as reflected in patient instructions      BMI:   Estimated body mass index is 34.54 kg/m  as calculated from the following:    Height as of this encounter: 1.626 m (5' 4\").    Weight as of this encounter: 91.3 kg (201 lb 3.2 oz).   Weight management plan: Discussed healthy diet and exercise guidelines      She reports that she has never smoked. She has never used smokeless tobacco.      Appropriate preventive services were discussed with this patient, including applicable screening as appropriate for cardiovascular disease, diabetes, osteopenia/osteoporosis, and glaucoma.  As appropriate for age/gender, discussed screening for colorectal cancer, prostate cancer, breast cancer, and cervical cancer. Checklist reviewing preventive services available has been given to the " patient.    Reviewed patients plan of care and provided an AVS. The Basic Care Plan (routine screening as documented in Health Maintenance) for Janelle Dickinson meets the Care Plan requirement. This Care Plan has been established and reviewed with the Patient.            YESSI Crews [facility] [place]    Identified Health Risks:   Not on chronic pain meds

## 2023-03-31 NOTE — RESULT ENCOUNTER NOTE
Dear Janelle Dickinson,    Here is a summary of your recent test results:    Labs look good.     Sodium is slightly decreased.  ADVISE: Minimize excessive water increase.     A1C (test of diabetes control the last 2-3 months) is at your goal. Please continue with your current plan. Also, you should make an appointment to recheck your A1C test in 6 months.       For additional lab test information, [email or web address] is an excellent reference.      Please call us at [phone] (or use [employer]) to address the above recommendations if needed.    Thank you for choosing  [facility].  It was an honor and a privilege to participate in your care.       Healthy regards,    Georgina Caputo, ROSS  [facility]

## 2023-04-11 PROBLEM — E11.9 TYPE 2 DIABETES MELLITUS WITHOUT COMPLICATION, WITHOUT LONG-TERM CURRENT USE OF INSULIN (H): Status: ACTIVE | Noted: 2023-04-11

## 2023-04-11 PROBLEM — D24.1 PAPILLOMA OF RIGHT BREAST: Status: ACTIVE | Noted: 2023-04-11

## 2023-04-11 PROBLEM — I10 HYPERTENSION GOAL BP (BLOOD PRESSURE) < 130/80: Status: ACTIVE | Noted: 2023-04-11

## 2023-04-11 PROBLEM — E78.5 HYPERLIPIDEMIA LDL GOAL <100: Status: ACTIVE | Noted: 2023-04-11

## 2023-06-20 ENCOUNTER — NURSE TRIAGE (OUTPATIENT)
Dept: NURSING | Facility: CLINIC | Age: 68
End: 2023-06-20
Payer: COMMERCIAL

## 2023-06-20 ENCOUNTER — MYC MEDICAL ADVICE (OUTPATIENT)
Dept: FAMILY MEDICINE | Facility: CLINIC | Age: 68
End: 2023-06-20
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
## 2023-06-22 NOTE — TELEPHONE ENCOUNTER
Please fit her into any providers schedule earlier if desired.     I have been and will be out of the clinic a few times in June and July that is likely why then delay getting in with me.       Healthy regards,            Georgina Caputo, FNP-BC

## 2023-06-23 ENCOUNTER — ANCILLARY PROCEDURE (OUTPATIENT)
Dept: GENERAL RADIOLOGY | Facility: CLINIC | Age: 68
End: 2023-06-23
Attending: NURSE PRACTITIONER
Payer: COMMERCIAL

## 2023-06-23 ENCOUNTER — OFFICE VISIT (OUTPATIENT)
Dept: FAMILY MEDICINE | Facility: CLINIC | Age: 68
End: 2023-06-23
Payer: COMMERCIAL

## 2023-06-23 VITALS
TEMPERATURE: 97.9 F | HEIGHT: 64 IN | BODY MASS INDEX: 34.49 KG/M2 | SYSTOLIC BLOOD PRESSURE: 148 MMHG | WEIGHT: 202 LBS | OXYGEN SATURATION: 98 % | HEART RATE: 98 BPM | DIASTOLIC BLOOD PRESSURE: 92 MMHG

## 2023-06-23 DIAGNOSIS — R05.3 CHRONIC COUGH: ICD-10-CM

## 2023-06-23 DIAGNOSIS — R05.1 ACUTE COUGH: Primary | ICD-10-CM

## 2023-06-23 DIAGNOSIS — R06.02 SHORTNESS OF BREATH: ICD-10-CM

## 2023-06-23 LAB — D DIMER PPP FEU-MCNC: <0.27 UG/ML FEU (ref 0–0.5)

## 2023-06-23 PROCEDURE — 71046 X-RAY EXAM CHEST 2 VIEWS: CPT | Mod: TC | Performed by: RADIOLOGY

## 2023-06-23 PROCEDURE — 85379 FIBRIN DEGRADATION QUANT: CPT | Performed by: NURSE PRACTITIONER

## 2023-06-23 PROCEDURE — 36415 COLL VENOUS BLD VENIPUNCTURE: CPT | Performed by: NURSE PRACTITIONER

## 2023-06-23 PROCEDURE — 93000 ELECTROCARDIOGRAM COMPLETE: CPT | Performed by: NURSE PRACTITIONER

## 2023-06-23 PROCEDURE — 99213 OFFICE O/P EST LOW 20 MIN: CPT | Performed by: NURSE PRACTITIONER

## 2023-06-23 RX ORDER — ALBUTEROL SULFATE 90 UG/1
2 AEROSOL, METERED RESPIRATORY (INHALATION) EVERY 6 HOURS PRN
Qty: 18 G | Refills: 1 | Status: SHIPPED | OUTPATIENT
Start: 2023-06-23

## 2023-06-23 RX ORDER — AZITHROMYCIN 250 MG/1
TABLET, FILM COATED ORAL
Qty: 6 TABLET | Refills: 0 | Status: SHIPPED | OUTPATIENT
Start: 2023-06-23 | End: 2023-06-28
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
## 2023-06-23 NOTE — RESULT ENCOUNTER NOTE
Dear Janelle Dickinson,    Here is a summary of your recent test results:    GREAT news D dimer is negative. There is NO need for a CT at this time.  I am still waiting for the radiologist to read your xray.     Continue to watch your symptoms and lets see how you do through the weekend.     Please be seen this weekend with any worsening.     For additional lab test information, [email or web address] is an excellent reference.    In addition, here is a list of due or overdue Health Maintenance reminders:    COVID-19 Vaccine(5 - Additional dose for Enedina series) due on 02/18/2023    Please call us at [phone] (or use [employer]) to address the above recommendations if needed.    Thank you for choosing  [facility].  It was an honor and a privilege to participate in your care.       Healthy regards,    Georgina Caputo, ROSS  [facility]

## 2023-06-28 NOTE — RESULT ENCOUNTER NOTE
Dear Janelle Dickinson,    Here is a summary of your recent test results:    -Chest xray was normal    For additional lab test information, [email or web address] is an excellent reference.    In addition, here is a list of due or overdue Health Maintenance reminders:    COVID-19 Vaccine(5 - Additional dose for Enedina series) due on 02/18/2023    Please call us at [phone] (or use [employer]) to address the above recommendations if needed.    Thank you for choosing [facility].  It was an honor and a privilege to participate in your care.       Healthy regards,    Georgina Caputo, JUANP  [facility]

## 2023-07-17 ENCOUNTER — TRANSFERRED RECORDS (OUTPATIENT)
Dept: HEALTH INFORMATION MANAGEMENT | Facility: CLINIC | Age: 68
End: 2023-07-17

## 2023-10-05 NOTE — TELEPHONE ENCOUNTER
Took note from patient and put it in Blue Bag to film for chart.    Put sticker (id) on paper.      Dorene ANG

## 2023-10-14 ENCOUNTER — HEALTH MAINTENANCE LETTER (OUTPATIENT)
Age: 68
End: 2023-10-14

## 2023-10-16 ENCOUNTER — HOSPITAL ENCOUNTER (OUTPATIENT)
Dept: MAMMOGRAPHY | Facility: CLINIC | Age: 68
Discharge: HOME | End: 2023-10-16
Attending: NURSE PRACTITIONER | Admitting: NURSE PRACTITIONER
Payer: COMMERCIAL

## 2023-10-16 DIAGNOSIS — R92.30 DENSE BREASTS: ICD-10-CM

## 2023-10-16 DIAGNOSIS — Z12.31 ENCOUNTER FOR SCREENING MAMMOGRAM FOR BREAST CANCER: ICD-10-CM

## 2023-10-16 PROCEDURE — 77067 SCR MAMMO BI INCL CAD: CPT

## 2023-10-17 NOTE — RESULT ENCOUNTER NOTE
Dear Janelle Dickinson,    Here is a summary of your recent test results:    Mammogram was normal.  ADVISE: rechecking in 1 year.    For additional lab test information, [email or web address] is an excellent reference.    In addition, here is a list of due or overdue Health Maintenance reminders:    Hepatitis B Vaccine(1 of 3 - Risk 3-dose series) Never done  RSV VACCINE 60+(1 - 1-dose 60+ series) Never done  Flu Vaccine(1) due on 09/01/2023  COVID-19 Vaccine(5 - 2023-24 season) due on 09/01/2023  A1C Lab due on 09/24/2023    Please call us at [phone] (or use [employer]) to address the above recommendations if needed.    Thank you for choosing [facility].  It was an honor and a privilege to participate in your care.       Healthy regards,    Georgina Caputo, ROSS  [facility]

## 2023-11-01 ENCOUNTER — LAB (OUTPATIENT)
Dept: LAB | Facility: CLINIC | Age: 68
End: 2023-11-01
Payer: COMMERCIAL

## 2023-11-01 DIAGNOSIS — E11.9 TYPE 2 DIABETES MELLITUS WITHOUT COMPLICATION, WITHOUT LONG-TERM CURRENT USE OF INSULIN (H): Primary | ICD-10-CM

## 2023-11-01 LAB — HBA1C MFR BLD: 6.5 % (ref 0–5.6)

## 2023-11-01 PROCEDURE — 83036 HEMOGLOBIN GLYCOSYLATED A1C: CPT

## 2023-11-01 PROCEDURE — 36415 COLL VENOUS BLD VENIPUNCTURE: CPT

## 2023-11-01 NOTE — RESULT ENCOUNTER NOTE
Dear Janelle Dickinson,    Here is a summary of your recent test results:    -A1C (test of diabetes control the last 2-3 months) is at your goal. Please recheck your A1C test in 6 months.     For additional lab test information, [email or web address] is an excellent reference.    In addition, here is a list of due or overdue Health Maintenance reminders:    Hepatitis B Vaccine(1 of 3 - Risk 3-dose series) Never done  RSV VACCINE 60+(1 - 1-dose 60+ series) Never done    Please call us at [phone] (or use [employer]) to address the above recommendations if needed.    Thank you for choosing  [email or web address].  It was an honor and a privilege to participate in your care.       Healthy regards,    Georgina Caputo, ROSS  [facility]

## 2024-02-21 ENCOUNTER — NURSE TRIAGE (OUTPATIENT)
Dept: FAMILY MEDICINE | Facility: CLINIC | Age: 69
End: 2024-02-21

## 2024-02-21 ENCOUNTER — OFFICE VISIT (OUTPATIENT)
Dept: FAMILY MEDICINE | Facility: CLINIC | Age: 69
End: 2024-02-21
Payer: COMMERCIAL

## 2024-02-21 VITALS
TEMPERATURE: 98.4 F | BODY MASS INDEX: 35.51 KG/M2 | OXYGEN SATURATION: 99 % | DIASTOLIC BLOOD PRESSURE: 86 MMHG | SYSTOLIC BLOOD PRESSURE: 162 MMHG | HEIGHT: 64 IN | HEART RATE: 71 BPM | RESPIRATION RATE: 11 BRPM | WEIGHT: 208 LBS

## 2024-02-21 DIAGNOSIS — E11.9 TYPE 2 DIABETES MELLITUS WITHOUT COMPLICATION, WITHOUT LONG-TERM CURRENT USE OF INSULIN (H): ICD-10-CM

## 2024-02-21 DIAGNOSIS — L72.0 INFECTED EPIDERMOID CYST: Primary | ICD-10-CM

## 2024-02-21 DIAGNOSIS — L08.9 INFECTED EPIDERMOID CYST: Primary | ICD-10-CM

## 2024-02-21 DIAGNOSIS — R55 NEAR SYNCOPE: ICD-10-CM

## 2024-02-21 DIAGNOSIS — I10 HYPERTENSION GOAL BP (BLOOD PRESSURE) < 130/80: ICD-10-CM

## 2024-02-21 DIAGNOSIS — E66.812 CLASS 2 SEVERE OBESITY DUE TO EXCESS CALORIES WITH SERIOUS COMORBIDITY AND BODY MASS INDEX (BMI) OF 35.0 TO 35.9 IN ADULT (H): ICD-10-CM

## 2024-02-21 DIAGNOSIS — E66.01 CLASS 2 SEVERE OBESITY DUE TO EXCESS CALORIES WITH SERIOUS COMORBIDITY AND BODY MASS INDEX (BMI) OF 35.0 TO 35.9 IN ADULT (H): ICD-10-CM

## 2024-02-21 LAB
BASOPHILS # BLD AUTO: 0.1 10E3/UL (ref 0–0.2)
BASOPHILS NFR BLD AUTO: 1 %
EOSINOPHIL # BLD AUTO: 0.6 10E3/UL (ref 0–0.7)
EOSINOPHIL NFR BLD AUTO: 8 %
ERYTHROCYTE [DISTWIDTH] IN BLOOD BY AUTOMATED COUNT: 12.9 % (ref 10–15)
GLUCOSE BLD-MCNC: 126 MG/DL (ref 60–99)
HCT VFR BLD AUTO: 34.2 % (ref 35–47)
HGB BLD-MCNC: 11.6 G/DL (ref 11.7–15.7)
IMM GRANULOCYTES # BLD: 0 10E3/UL
IMM GRANULOCYTES NFR BLD: 0 %
LYMPHOCYTES # BLD AUTO: 3.3 10E3/UL (ref 0.8–5.3)
LYMPHOCYTES NFR BLD AUTO: 41 %
MCH RBC QN AUTO: 29.7 PG (ref 26.5–33)
MCHC RBC AUTO-ENTMCNC: 33.9 G/DL (ref 31.5–36.5)
MCV RBC AUTO: 88 FL (ref 78–100)
MONOCYTES # BLD AUTO: 0.7 10E3/UL (ref 0–1.3)
MONOCYTES NFR BLD AUTO: 9 %
NEUTROPHILS # BLD AUTO: 3.4 10E3/UL (ref 1.6–8.3)
NEUTROPHILS NFR BLD AUTO: 42 %
PLATELET # BLD AUTO: 271 10E3/UL (ref 150–450)
RBC # BLD AUTO: 3.9 10E6/UL (ref 3.8–5.2)
WBC # BLD AUTO: 8 10E3/UL (ref 4–11)

## 2024-02-21 PROCEDURE — 82947 ASSAY GLUCOSE BLOOD QUANT: CPT | Performed by: NURSE PRACTITIONER

## 2024-02-21 PROCEDURE — 10160 PNXR ASPIR ABSC HMTMA BULLA: CPT | Performed by: NURSE PRACTITIONER

## 2024-02-21 PROCEDURE — 36415 COLL VENOUS BLD VENIPUNCTURE: CPT | Performed by: NURSE PRACTITIONER

## 2024-02-21 PROCEDURE — 85025 COMPLETE CBC W/AUTO DIFF WBC: CPT | Performed by: NURSE PRACTITIONER

## 2024-02-21 PROCEDURE — 99213 OFFICE O/P EST LOW 20 MIN: CPT | Mod: 25 | Performed by: NURSE PRACTITIONER

## 2024-02-21 RX ORDER — RESPIRATORY SYNCYTIAL VIRUS VACCINE 120MCG/0.5
0.5 KIT INTRAMUSCULAR ONCE
Qty: 1 EACH | Refills: 0 | Status: CANCELLED | OUTPATIENT
Start: 2024-02-21 | End: 2024-02-21

## 2024-02-21 RX ORDER — SULFAMETHOXAZOLE/TRIMETHOPRIM 800-160 MG
1 TABLET ORAL 2 TIMES DAILY
Qty: 20 TABLET | Refills: 0 | Status: SHIPPED | OUTPATIENT
Start: 2024-02-21 | End: 2024-03-02

## 2024-02-21 NOTE — TELEPHONE ENCOUNTER
Reason for Disposition    Patient wants to be seen    Additional Information    Negative: Sounds like a life-threatening emergency to the triager    Negative: Followed a burn    Negative: Followed an injury to the skin (such as a cut or scrape)    Negative: Boil (abscess) suspected (painful red lump)    Negative: Widespread rash or redness    Negative: Cold sore suspected (i.e., fever blister sore on the outer lip)    Negative: Insect bite(s) suspected    Negative: Poison ivy, oak, or sumac rash suspected (e.g., itchy rash after contact with poison ivy)    Negative: Sore(s) on female genital area  (e.g., labia, vagina, vulva)    Negative: Sore(s) on male genital area (e.g., penis, scrotum)    Negative: Wound infection suspected (in traumatic or surgical wound)    Negative: Black (necrotic), dark purple, or blisters develop in area of sore    Negative: Patient sounds very sick or weak to the triager    Negative: Looks infected (e.g., spreading redness, pus) and fever    Negative: Looks infected and large red area (> 2 inches or 5 cm) or streak    Negative: Ulcer with black scab that is spreading, painless and cause unknown    Negative: Looks infected (e.g., spreading redness, pus) and no fever    Negative: Unexplained sores and 3 or more    Negative: Large sore (> 1 inch or 2.5 cm across)    Negative: Looks like a boil, deep ulcer or is very painful    Negative: Multiple small blisters grouped together in one area of body (i.e., dermatomal distribution or 'band' or 'stripe') and painful    Negative: New or worsening foot sore (e.g., ulcer, wound, blister, red area) and has diabetes    Negative: Sores and crusts are around openings to nose, or anywhere else on face    Negative: Any other family members with similar sores    Negative: New pressure ulcer suspected    Negative: Using antibiotic ointment > 24 hours and new sore occurs    Negative: Using antibiotic ointment > 48 hours and sore increases in size     "Negative: Using antibiotic ointment > 1 week and sore not completely healed    Negative: Lower leg sore and venous ulcer suspected (e.g., brownish pigment around sore, leg edema, varicose veins)    Answer Assessment - Initial Assessment Questions  1. APPEARANCE of SORES: \"What do the sores look like?\"      Open area maybe burst cyst or boil  2. NUMBER: \"How many sores are there?\"      One   3. SIZE: \"How big is the largest sore?\"      Size of a pinky nail  4. LOCATION: \"Where are the sores located?\"      abdomen  5. ONSET: \"When did the sores begin?\"      A week, Sunday it started draining  6. TENDER: \"Does it hurt when you touch it?\"  (Scale 1-10; or mild, moderate, severe)       Yes, increasing  7. CAUSE: \"What do you think is causing the sores?\"      cyst  8. OTHER SYMPTOMS: \"Do you have any other symptoms?\" (e.g., fever, new weakness)      No    Protocols used: Sores-A-OH    "

## 2024-02-21 NOTE — PROGRESS NOTES
"  Assessment & Plan     Infected epidermoid cyst  Larger than anticipated area would really appreciate gen surg removal.   Close follow up with surgery versus us until healed.   Unable to get accurate culture of drainage. Will cover for staph due to diabetes.   Normal CBC no systemic signs. Localized infection.   Janelle Dickinson verbalizes understanding of plan of care and is in agreement.   - sulfamethoxazole-trimethoprim (BACTRIM DS) 800-160 MG tablet  Dispense: 20 tablet; Refill: 0  - Adult General Surg Referral  - Puncture (I&D)Drainage of Lesion/Cyst  [[id number]]    Near syncope  Due to lidocaine and procedure quick resolve. Normal blood sugar.   - Glucose, whole blood  - Glucose, whole blood    Class 2 severe obesity due to excess calories with serious comorbidity and body mass index (BMI) of 35.0 to 35.9 in adult (H)      Type 2 diabetes mellitus without complication, without long-term current use of insulin (H)    - sulfamethoxazole-trimethoprim (BACTRIM DS) 800-160 MG tablet  Dispense: 20 tablet; Refill: 0    Hypertension goal BP (blood pressure) < 130/80        BMI  Estimated body mass index is 35.7 kg/m  as calculated from the following:    Height as of this encounter: 1.626 m (5' 4\").    Weight as of this encounter: 94.3 kg (208 lb).       Return in about 1 day (around 2/22/2024) for Recheck with gen surg.    Subjective   Janelle Dickinson is a 68 year old, presenting for the following health issues:  Derm Problem        2/21/2024     3:05 PM   Additional Questions   Roomed by Csaba CMA   Accompanied by Self     HPI     Right side of abdomen at waist line.   Looked like a pimple - kept growing and got red, then turned purple - 3-4 days ago popped open - pus and blood mixed.   Last night - pulled gauze pad off when changing dressing, a core, rubbery, looked curdled, had a hole after came out.   Pain - twinges, paper tape pulls   Changed dressing 2 hours ago - states sister said she can still see stuff in there.   Still " "bleeding. No spreading of the discoloration.          Triaged - 02/21/2024 - 7:41a     Reason for Disposition    Patient wants to be seen    Answer Assessment - Initial Assessment Questions  1. APPEARANCE of SORES: \"What do the sores look like?\"      Open area maybe burst cyst or boil  2. NUMBER: \"How many sores are there?\"      One   3. SIZE: \"How big is the largest sore?\"      Size of a pinky nail  4. LOCATION: \"Where are the sores located?\"      abdomen  5. ONSET: \"When did the sores begin?\"      A week, Sunday it started draining  6. TENDER: \"Does it hurt when you touch it?\"  (Scale 1-10; or mild, moderate, severe)       Yes, increasing  7. CAUSE: \"What do you think is causing the sores?\"      cyst  8. OTHER SYMPTOMS: \"Do you have any other symptoms?\" (e.g., fever, new weakness)      No    Protocols used: Sores-A-OH            Constitutional, HEENT, cardiovascular, pulmonary, GI, , musculoskeletal, neuro, skin, endocrine and psych systems are negative, except as otherwise noted in the HPI.        Objective    BP (!) 162/86   Pulse 71   Temp 98.4  F (36.9  C) (Tympanic)   Resp 11   Ht 1.626 m (5' 4\")   Wt 94.3 kg (208 lb)   SpO2 99%   BMI 35.70 kg/m    Body mass index is 35.7 kg/m .  Physical Exam   GENERAL: alert and no distress  SKIN: Right lower lateral abdomen with superfiical lump that has two open areas oozing minimal surrounding erythema and edema. Area warm and very tender to touch. Most consistent with infected epidermoid cyst.       After informed consent was obtained, using Shur-clens for cleansing and 1% Lidocaine with epinephrine for anesthetic patient had mild vasovagal response which quickly resolved with cold compress.   Stable vitals noted.   With sterile technique, attempted incision and drainage of infected cyst with puncture for removal was performed.   Minimal yellow drainage follow by bloody cottage cheese curdled contents. Area is noted to be deeper and wider than initially " anticipated. Could not get all contents out.   Procedure stopped will send to gen surgery.   Area packed with 1/2 inch iodoform   Dressing is applied.     Results for orders placed or performed in visit on 02/21/24   Glucose, whole blood     Status: Abnormal   Result Value Ref Range    Glucose Whole Blood 126 (H) 60 - 99 mg/dL   CBC with platelets and differential     Status: Abnormal   Result Value Ref Range    WBC Count 8.0 4.0 - 11.0 10e3/uL    RBC Count 3.90 3.80 - 5.20 10e6/uL    Hemoglobin 11.6 (L) 11.7 - 15.7 g/dL    Hematocrit 34.2 (L) 35.0 - 47.0 %    MCV 88 78 - 100 fL    MCH 29.7 26.5 - 33.0 pg    MCHC 33.9 31.5 - 36.5 g/dL    RDW 12.9 10.0 - 15.0 %    Platelet Count 271 150 - 450 10e3/uL    % Neutrophils 42 %    % Lymphocytes 41 %    % Monocytes 9 %    % Eosinophils 8 %    % Basophils 1 %    % Immature Granulocytes 0 %    Absolute Neutrophils 3.4 1.6 - 8.3 10e3/uL    Absolute Lymphocytes 3.3 0.8 - 5.3 10e3/uL    Absolute Monocytes 0.7 0.0 - 1.3 10e3/uL    Absolute Eosinophils 0.6 0.0 - 0.7 10e3/uL    Absolute Basophils 0.1 0.0 - 0.2 10e3/uL    Absolute Immature Granulocytes 0.0 <=0.4 10e3/uL   CBC with platelets and differential     Status: Abnormal    Narrative    The following orders were created for panel order CBC with platelets and differential.  Procedure                               Abnormality         Status                     ---------                               -----------         ------                     CBC with platelets and d...[[id number]]  Abnormal            Final result                 Please view results for these tests on the individual orders.         Signed Electronically by: YESSI Crews CNP

## 2024-02-22 ENCOUNTER — TELEPHONE (OUTPATIENT)
Dept: FAMILY MEDICINE | Facility: CLINIC | Age: 69
End: 2024-02-22

## 2024-02-22 ENCOUNTER — OFFICE VISIT (OUTPATIENT)
Dept: SURGERY | Facility: CLINIC | Age: 69
End: 2024-02-22
Payer: COMMERCIAL

## 2024-02-22 VITALS
HEART RATE: 74 BPM | OXYGEN SATURATION: 98 % | HEIGHT: 64 IN | SYSTOLIC BLOOD PRESSURE: 162 MMHG | DIASTOLIC BLOOD PRESSURE: 88 MMHG | RESPIRATION RATE: 16 BRPM | WEIGHT: 208 LBS | BODY MASS INDEX: 35.51 KG/M2

## 2024-02-22 DIAGNOSIS — L72.9 INFECTED CYST OF SKIN: Primary | ICD-10-CM

## 2024-02-22 DIAGNOSIS — L08.9 INFECTED CYST OF SKIN: Primary | ICD-10-CM

## 2024-02-22 PROCEDURE — 99212 OFFICE O/P EST SF 10 MIN: CPT | Performed by: SURGERY

## 2024-02-22 NOTE — TELEPHONE ENCOUNTER
Patient viewed [place] 2/22 @ 904am     Called and confirmed she is going today @ 215pm    ANIA Caputo.

## 2024-02-22 NOTE — LETTER
Showering Before Surgery      Your surgeon has asked you to take 2 showers before surgery.    Why is this important?  It is normal for bacteria (germs) to be on your skin. The skin protects us from these germs. When you have surgery, we cut the skin. Sometimes germs get into the cuts and cause infection (illness caused by germs). By following the instructions below and using special soap, you will lower the number of germs on your skin. This decreases your chance of infection.  Special soap  Buy or get 8 ounces of antiseptic surgical soap called 4% CHG. Common name brands of this soap are Hibiclens and Exidine.  You can find it at your local pharmacy, clinic or retail store. If you have trouble, ask your pharmacist to help you find the right substitute.  A note about shaving:  Do not shave within 12 inches of your incision (surgical cut) area for at least 3 days before surgery. Shaving can make small cuts in the skin. This puts you at a higher risk of infection.  Items you will need for each shower:  1 newly washed towel  4 ounces of one of the above soaps  Clean pajamas or clothes to change into    Follow these instructions:  Follow these steps the evening before surgery and the morning of surgery.  Wash your hair and body with your regular shampoo and soap. Make sure you rinse the shampoo and soap from your hair and body.  Using clean hands, apply about 2 ounces of soap gently on your skin from your ear lobes to your toes. Use on your groin area last. Do not use this soap on your face or head. If you get any soap in your eyes, ears or mouth, rinse right away.  Repeat step 2. It is very important to let the soap stay on your skin for at least 1 minute.    Rinse well and dry off using a clean towel.    If you feel any tingling, itching or other irritation, rinse right away. It is normal to feel some coolness on the skin after using the antiseptic soap. Your skin may feel a bit dry after the shower, but do not use  any lotions, creams or moisturizers. Do not use hair spray or other products in your hair.  5.  Dress in freshly washed clothes or pajamas. Use fresh pillowcases and sheets on your bed.    Repeat these steps the morning of surgery.  If you have any questions about showering or an allergy to CHG soap, please call your surgery center.    For informational purposes only. Not to replace the advice of your health care provider. Copyright   2012 [facility]. All rights reserved. Clinically reviewed by [employer]. [employer] 122520 - REV 12

## 2024-02-22 NOTE — TELEPHONE ENCOUNTER
Per provider patient should see general surgery today- provider was unable to remove abscess in clinic yesterday     Called [phone]  Dr. Novak 230pm apt, check in 215pm   Address: [address], [place] [address]  Phone: [phone]      Called patient  and left message with patient about appointment time and location.   Sent information via [employer] also.     See [employer] regarding  clinic apt tomorrow   Future Appointments 2/22/2024 - 8/20/2024        Date Visit Type Length Department Provider     2/22/2024  2:30 PM NEW 30 min  SURGICAL CONSULT Den Novak MD    Location Instructions:     Our office is located at [address] [facility] in [place].We are just East of [facility] in the [facility].When entering the building,please take the elevator to the 3rd Floor.When exiting the elevator,go to the Left.&nbsp; The clinic will be the last door on the Right in [address].              2/23/2024  1:30 PM OFFICE VISIT 30 min  FAMILY PRACTICE Olinda Fragoso, APRN CNP    Location Instructions:     [facility] is located at [address], along [address]. Free parking is available; access the lot by turning north from [address] onto [facility] [facility], then west onto [facility].

## 2024-02-22 NOTE — TELEPHONE ENCOUNTER
Its more consistent with an infected epidermoid cyst. Deeper and large than expected once procedure started.   Appreciate gen surgery to fully remove and monitor her healing given higher risk for infection as a diabetes.     Healthy regards,            Georgina Caputo, FNP-BC

## 2024-02-22 NOTE — LETTER
2024       Janelle Bhat    RE: [id number]  : 1955    Janelle Bhat has been scheduled for surgery on 3-8-24 at 1:00 pm at [facility] with Dr Den Novak.  We are located at [address], [address], [place] [address]     Please check in at 12:45 pm.      You can eat and drink up until surgery        You should shower before your surgery with Hibiclens or Exidine soap.  This can be found at your local pharmacy or you can pick it up from our office for free.  Please call our office if you have any questions.     It is sometimes necessary to adjust the surgery schedule due to emergencies and additions to the schedule.  If your surgery is affected by this, we greatly appreciate your flexibility and understanding in this matter    It is best if you call regarding post-operative questions between the hours of 8:00 am & 3:00 pm Monday-Friday, so you have access to the daytime care team that know you best.  Prescription refills are accepted during regular office hours only.        If you have questions or concerns, please contact our office at [phone].

## 2024-02-23 ENCOUNTER — PATIENT OUTREACH (OUTPATIENT)
Dept: CARE COORDINATION | Facility: CLINIC | Age: 69
End: 2024-02-23

## 2024-02-23 NOTE — RESULT ENCOUNTER NOTE
Dear Janelle Dickinson,    Here is a summary of your recent test results:    You have slight anemia otherwise your blood work looks good.  I see they want to do the removal in the operating room. That is good to get it all out of there and get you on the road to recovery. Let me know if you need a pre op visit. I will fit you in next week.     For additional lab test information, [email or web address] is an excellent reference.    In addition, here is a list of due or overdue Health Maintenance reminders:    RSV VACCINE (Pregnancy & 60+)(1 - 1-dose 60+ series) Never done  Eye Exam due on 01/10/2024    Please call us at [phone] (or use [employer]) to address the above recommendations if needed.    [facility] you for choosing [facility].  It was an honor and a privilege to participate in your care.       Healthy regards,    Georgina Caputo, JUANP  [facility]

## 2024-03-01 ENCOUNTER — NURSE TRIAGE (OUTPATIENT)
Dept: FAMILY MEDICINE | Facility: CLINIC | Age: 69
End: 2024-03-01
Payer: COMMERCIAL

## 2024-03-01 ENCOUNTER — TRANSFERRED RECORDS (OUTPATIENT)
Dept: MULTI SPECIALTY CLINIC | Facility: CLINIC | Age: 69
End: 2024-03-01

## 2024-03-01 LAB — RETINOPATHY: NORMAL

## 2024-03-01 NOTE — TELEPHONE ENCOUNTER
"Patient calling, asking if she needs to be worried about the small redness around the infected area (infected epidermoid cyst). Patient reports small red flat spots(size is \"almost the size of patient's smallest fingernail\") all over the breast to belly button. Does not spread anywhere else. Patient reports \"my wound looks a lot better. The antibiotic has done wonders.\" Patient denies having fever, pain or any other symptoms. \"I don't feel anything and I feel comfortable waiting til my surgery next week.\" Home care advised. Advised to watch out for any worsening symptoms and to call triage nurse for any concerns. Patient verbalized understanding and gratitude,.     Reason for Disposition   Taking antibiotic and wound infection symptoms are BETTER and no fever    Answer Assessment - Initial Assessment Questions  1. SYMPTOM: \"What's the main symptom you're concerned about?\" (e.g., redness, swelling, pain, fever, weakness)      Redness, no swelling.  2. WOUND INFECTION LOCATION: \"Where is the wound infection located?\" (e.g., arm, face, foot, knee, leg)      Right, waist area  3. WOUND INFECTION SIZE: \"What is the size of the red area?\" (e.g., inches, centimeters; compare to size of a coin)       \"For the most part, they're really small. Same size to my little finger nail and definitely smaller than the smallest coin\"  4. BETTER-SAME-WORSE: \"Are you getting better, staying the same, or getting worse compared to the day you started the antibiotics?\"       \"A lot better. The antibiotic has done wonders. \"  5. PAIN: \"Do you have any pain?\"  If Yes, ask: \"How bad is the pain?\"  (e.g., Scale 1-10; mild, moderate, or severe)     - MILD (1-3): Doesn't interfere with normal activities.      - MODERATE (4-7): Interferes with normal activities or awakens from sleep.     - SEVERE (8-10): Excruciating pain, unable to do any normal activities.        No.  6. FEVER: \"Do you have a fever?\" If Yes, ask: \"What is it, how was it measured " "and when did it start?\"      No  7. OTHER SYMPTOMS: \"Do you have any other symptoms?\" (e.g., pus coming from a wound, red streaks, weakness)      Denies all of the symptoms above and any other symptoms.  9. ANTIBIOTIC NAME: \"What antibiotic(s) are you taking?\"  \"How many times a day?\" Note: Be sure the patient is taking the antibiotic as directed.       bactrim  10. ANTIBIOTIC DATE: \"When was the antibiotic started?\"        02/21/24  11. FOLLOW-UP APPOINTMENT: \"Do you have a follow-up appointment with your doctor?\"        Follow-up to remove infected cyst.    Protocols used: Wound Infection on Antibiotic Follow-up Call-A-[place]    "

## 2024-03-02 ENCOUNTER — OFFICE VISIT (OUTPATIENT)
Dept: URGENT CARE | Facility: URGENT CARE | Age: 69
End: 2024-03-02
Payer: COMMERCIAL

## 2024-03-02 VITALS
TEMPERATURE: 98 F | HEART RATE: 73 BPM | HEIGHT: 64 IN | BODY MASS INDEX: 35.51 KG/M2 | SYSTOLIC BLOOD PRESSURE: 137 MMHG | OXYGEN SATURATION: 98 % | DIASTOLIC BLOOD PRESSURE: 83 MMHG | WEIGHT: 208 LBS

## 2024-03-02 DIAGNOSIS — L27.0 DRUG RASH: Primary | ICD-10-CM

## 2024-03-02 PROCEDURE — 99213 OFFICE O/P EST LOW 20 MIN: CPT | Performed by: FAMILY MEDICINE

## 2024-03-02 NOTE — PATIENT INSTRUCTIONS
I would recommend claritin or other allergy medicine daily until the rash clears up    No more bactrim antibiotic    I don't think will impact surgery

## 2024-03-02 NOTE — PROGRESS NOTES
"Assessment & Plan     Drug rash  Likely due to bactrim  Antihistamine  No more bactrim              No follow-ups on file.    Levy Moran MD  [facility] URGENT C[facility]     Janelle Dickinson is a 68 year old female who presents to clinic today for the following health issues:  Chief Complaint   Patient presents with    Urgent Care     Spreading generalized rash since yesterday morning.     HPI    Here with rash.  Has been taking antibiotic  No itch or pain.  No welts.  No medicine for it.  No SOB  No trouble swalling.  Has been bactrim  Nausea with pill        Review of Systems        Objective    /83   Pulse 73   Temp 98  F (36.7  C) (Tympanic)   Ht 1.626 m (5' 4\")   Wt 94.3 kg (208 lb)   SpO2 98%   BMI 35.70 kg/m    Physical Exam  Vitals and nursing note reviewed.   Constitutional:       Appearance: Normal appearance.   Cardiovascular:      Rate and Rhythm: Normal rate and regular rhythm.      Pulses: Normal pulses.      Heart sounds: Normal heart sounds.   Pulmonary:      Effort: Pulmonary effort is normal.      Breath sounds: Normal breath sounds.   Skin:     Findings: Rash present.      Comments: Generalized maculopapular rash    Wound looks great   Neurological:      Mental Status: She is alert.                    "

## 2024-03-02 NOTE — TELEPHONE ENCOUNTER
Patient calling back to notify that she has uploaded pictures of the rash to [employer]. Asked patient if the rash has spread to other parts of her body. The rash has gone to the arms and up almost to her neck. No blisters, itching, peeling. Patient is feeling well.   Protocol recommends home care.  Patient will monitor. If rash worsens she will call back. Informed if the rash worsens the recommendation may be to be seen in . Discussed location closest to her but encouraged patient to call back first.  Megha Bentley RN   03/01/24 6:38 PM  [facility] Nurse Advisor    Additional Information   Negative: Shock suspected (e.g., cold/pale/clammy skin, too weak to stand, low BP, rapid pulse)   Negative: Widespread rash and bright red, sunburn-like and too weak to stand   Negative: Sounds like a life-threatening emergency to the triager   Negative: Animal bite wound infection on antibiotic   Negative: Cellulitis on antibiotic   Negative: Widespread rash and drug rash suspected (i.e., allergic reaction to antibiotic)   Negative: SEVERE pain in the wound   Negative: SEVERE pain with bending of finger (or toe) and wound on hand (or foot)   Negative: Widespread rash and bright red, sunburn-like   Negative: Fever > 103 F (39.4 C)   Negative: Black (necrotic), dark purple, or blisters develop in area of wound   Negative: Patient sounds very sick or weak to the triager   Negative: Finger wound and entire finger swollen   Negative: New-onset fever > 100.0 F (37.8 C)   Negative: New-onset red streak runs from wound   Negative: Caller has URGENT question and triager unable to answer question   Negative: Taking antibiotic > 24 hours and wound infection symptoms are WORSE (e.g., draining pus, pain, red streak, spreading redness, swelling)   Negative: Taking antibiotic > 24 hours and fever WORSE   Negative: Taking antibiotic > 48 hours (2 days) and fever still present (SAME, not better)   Negative: Taking antibiotic > 72 hours (3  days) and wound infection symptoms SAME (e.g., draining pus, pain, red streak, spreading redness, swelling)   Negative: Finished taking antibiotic and wound infection symptoms are WORSE (e.g., draining pus, pain, red streak, spreading redness, swelling)   Negative: Patient wants to be seen   Negative: Diabetes mellitus or weak immune system (e.g., HIV positive, cancer chemo, splenectomy, organ transplant, chronic steroids)   Negative: Caller has NON-URGENT question and triager unable to answer question   Negative: Finished taking antibiotic and wound infection symptoms are BETTER BUT not completely gone   Negative: Sounds like a recheck is needed to the triager    Protocols used: Wound Infection on Antibiotic Follow-up Call-A-[facility]

## 2024-03-06 ENCOUNTER — OFFICE VISIT (OUTPATIENT)
Dept: OPTOMETRY | Facility: CLINIC | Age: 69
End: 2024-03-06
Payer: COMMERCIAL

## 2024-03-06 DIAGNOSIS — H52.13 MYOPIA OF BOTH EYES WITH ASTIGMATISM: ICD-10-CM

## 2024-03-06 DIAGNOSIS — H52.4 PRESBYOPIA: ICD-10-CM

## 2024-03-06 DIAGNOSIS — E11.9 TYPE 2 DIABETES MELLITUS WITHOUT RETINOPATHY (H): Primary | ICD-10-CM

## 2024-03-06 DIAGNOSIS — H52.203 MYOPIA OF BOTH EYES WITH ASTIGMATISM: ICD-10-CM

## 2024-03-06 PROCEDURE — 92015 DETERMINE REFRACTIVE STATE: CPT | Performed by: OPTOMETRIST

## 2024-03-06 PROCEDURE — 92014 COMPRE OPH EXAM EST PT 1/>: CPT | Performed by: OPTOMETRIST

## 2024-03-06 ASSESSMENT — REFRACTION_MANIFEST
OD_AXIS: 150
OD_AXIS: 154
OS_AXIS: 040
OS_ADD: +2.50
OD_CYLINDER: +1.75
METHOD_AUTOREFRACTION: 1
OD_SPHERE: -3.50
OD_SPHERE: -3.00
OD_CYLINDER: +0.75
OS_AXIS: 033
OD_ADD: +2.50
OS_SPHERE: -4.25
OS_CYLINDER: +1.50
OS_SPHERE: -4.50
OS_CYLINDER: +1.75

## 2024-03-06 ASSESSMENT — REFRACTION_WEARINGRX
SPECS_TYPE: PAL
OS_ADD: +2.50
OS_AXIS: 035
OS_SPHERE: -4.00
OD_CYLINDER: +1.75
OS_CYLINDER: +1.75
OD_AXIS: 165
OD_ADD: +2.50
OD_SPHERE: -3.50

## 2024-03-06 ASSESSMENT — CONF VISUAL FIELD
OS_SUPERIOR_NASAL_RESTRICTION: 0
OS_NORMAL: 1
METHOD: COUNTING FINGERS
OS_INFERIOR_TEMPORAL_RESTRICTION: 0
OS_SUPERIOR_TEMPORAL_RESTRICTION: 0
OS_INFERIOR_NASAL_RESTRICTION: 0
OD_INFERIOR_TEMPORAL_RESTRICTION: 0
OD_NORMAL: 1
OD_INFERIOR_NASAL_RESTRICTION: 0
OD_SUPERIOR_TEMPORAL_RESTRICTION: 0
OD_SUPERIOR_NASAL_RESTRICTION: 0

## 2024-03-06 ASSESSMENT — TONOMETRY
OS_IOP_MMHG: 18
OD_IOP_MMHG: 18
IOP_METHOD: APPLANATION

## 2024-03-06 ASSESSMENT — EXTERNAL EXAM - LEFT EYE: OS_EXAM: NORMAL

## 2024-03-06 ASSESSMENT — EXTERNAL EXAM - RIGHT EYE: OD_EXAM: NORMAL

## 2024-03-06 ASSESSMENT — VISUAL ACUITY
METHOD: SNELLEN - LINEAR
CORRECTION_TYPE: GLASSES
OS_CC: 20/30
OS_CC: 20/20
OD_CC: 20/20
OD_CC: 20/20

## 2024-03-06 ASSESSMENT — KERATOMETRY
OS_AXISANGLE2_DEGREES: 136
OS_K1POWER_DIOPTERS: 46.50
OD_K1POWER_DIOPTERS: 47.12
OS_K2POWER_DIOPTERS: 48.62
OD_AXISANGLE2_DEGREES: 2
OD_AXISANGLE_DEGREES: 92
OD_K2POWER_DIOPTERS: 47.87
OS_AXISANGLE_DEGREES: 46

## 2024-03-06 ASSESSMENT — CUP TO DISC RATIO
OD_RATIO: 0.1
OS_RATIO: 0.15

## 2024-03-06 NOTE — LETTER
3/6/2024         RE: Janelle Bhat  [address] [place] [address]        Dear Colleague,    Thank you for referring your patient, Janelle Bhat, to the [facility]. Please see a copy of my visit note below.    Chief Complaint   Patient presents with     Diabetic Eye Exam       Lab Results   Component Value Date    A1C 6.5 11/01/2023    A1C 6.3 03/24/2023    A1C 6.3 10/21/2022    A1C 6.3 02/01/2022    A1C 6.3 08/17/2021    A1C 6.3 12/21/2020    A1C 6.4 06/18/2020    A1C 6.4 10/25/2019    A1C 6.6 05/02/2019            Last Eye Exam: 01/2023  Dilated Previously: Yes, side effects of dilation explained today    What are you currently using to see?  glasses    Distance Vision Acuity: Satisfied with vision    Near Vision Acuity: Satisfied with vision while reading and using computer with glasses    Eye Comfort: good  Do you use eye drops? : No      Alina Gruber - Optometric Assistant      Medical, surgical and family histories reviewed and updated 3/6/2024.       OBJECTIVE: See Ophthalmology exam    ASSESSMENT:  No diagnosis found.    PLAN:    Janelle Bhat aware  eye exam results will be sent to Georgina Caputo.  There are no Patient Instructions on file for this visit.          Again, thank you for allowing me to participate in the care of your patient.        Sincerely,        Tonie Matthews OD

## 2024-03-06 NOTE — PROGRESS NOTES
Chief Complaint   Patient presents with    Diabetic Eye Exam       Lab Results   Component Value Date    A1C 6.5 11/01/2023    A1C 6.3 03/24/2023    A1C 6.3 10/21/2022    A1C 6.3 02/01/2022    A1C 6.3 08/17/2021    A1C 6.3 12/21/2020    A1C 6.4 06/18/2020    A1C 6.4 10/25/2019    A1C 6.6 05/02/2019            Last Eye Exam: 01/2023  Dilated Previously: Yes, side effects of dilation explained today    What are you currently using to see?  glasses    Distance Vision Acuity: Satisfied with vision    Near Vision Acuity: Satisfied with vision while reading and using computer with glasses    Eye Comfort: good  Do you use eye drops? : No      Alina Gruber - Optometric Assistant      Medical, surgical and family histories reviewed and updated 3/6/2024.       OBJECTIVE: See Ophthalmology exam    ASSESSMENT:  No diagnosis found.    PLAN:    Janelle Bhat aware  eye exam results will be sent to Georgina Caputo.  There are no Patient Instructions on file for this visit.

## 2024-03-06 NOTE — PATIENT INSTRUCTIONS
Patient Education   Diabetes weakens the blood vessels all over the body, including the eyes. Damage to the blood vessels in the eyes can cause swelling or bleeding into part of the eye (called the retina). This is called diabetic retinopathy (SG-tin--pu-thee). If not treated, this disease can cause vision loss or blindness.   Symptoms may include blurred or distorted vision, but many people have no symptoms. It's important to see your eye doctor regularly to check for problems.   Early treatment and good control can help protect your vision. Here are the things you can do to help prevent vision loss:      1. Keep your blood sugar levels under tight control.      2. Bring high blood pressure under control.      3. No smoking.      4. Have yearly dilated eye exams.    No retinopathy no change in prescription needed

## 2024-03-08 ENCOUNTER — PATIENT OUTREACH (OUTPATIENT)
Dept: CARE COORDINATION | Facility: CLINIC | Age: 69
End: 2024-03-08

## 2024-03-08 ENCOUNTER — LAB (OUTPATIENT)
Dept: LAB | Facility: CLINIC | Age: 69
End: 2024-03-08
Payer: COMMERCIAL

## 2024-03-08 ENCOUNTER — OFFICE VISIT (OUTPATIENT)
Dept: SURGERY | Facility: CLINIC | Age: 69
End: 2024-03-08
Payer: COMMERCIAL

## 2024-03-08 VITALS
OXYGEN SATURATION: 100 % | RESPIRATION RATE: 16 BRPM | DIASTOLIC BLOOD PRESSURE: 82 MMHG | SYSTOLIC BLOOD PRESSURE: 134 MMHG | BODY MASS INDEX: 35.51 KG/M2 | WEIGHT: 208 LBS | HEIGHT: 64 IN | HEART RATE: 74 BPM

## 2024-03-08 DIAGNOSIS — L72.9 INFECTED CYST OF SKIN: Primary | ICD-10-CM

## 2024-03-08 DIAGNOSIS — L08.9 INFECTED CYST OF SKIN: Primary | ICD-10-CM

## 2024-03-08 PROCEDURE — 88304 TISSUE EXAM BY PATHOLOGIST: CPT | Performed by: PATHOLOGY

## 2024-03-08 PROCEDURE — 11402 EXC TR-EXT B9+MARG 1.1-2 CM: CPT | Performed by: SURGERY

## 2024-04-01 DIAGNOSIS — E78.5 HYPERLIPIDEMIA LDL GOAL <100: ICD-10-CM

## 2024-04-01 RX ORDER — ATORVASTATIN CALCIUM 40 MG/1
40 TABLET, FILM COATED ORAL DAILY
Qty: 90 TABLET | Refills: 0 | Status: SHIPPED | OUTPATIENT
Start: 2024-04-01 | End: 2024-04-09

## 2024-04-08 SDOH — HEALTH STABILITY: PHYSICAL HEALTH: ON AVERAGE, HOW MANY DAYS PER WEEK DO YOU ENGAGE IN MODERATE TO STRENUOUS EXERCISE (LIKE A BRISK WALK)?: 3 DAYS

## 2024-04-08 SDOH — HEALTH STABILITY: PHYSICAL HEALTH: ON AVERAGE, HOW MANY MINUTES DO YOU ENGAGE IN EXERCISE AT THIS LEVEL?: 20 MIN

## 2024-04-08 ASSESSMENT — SOCIAL DETERMINANTS OF HEALTH (SDOH): HOW OFTEN DO YOU GET TOGETHER WITH FRIENDS OR RELATIVES?: THREE TIMES A WEEK

## 2024-04-09 ENCOUNTER — OFFICE VISIT (OUTPATIENT)
Dept: FAMILY MEDICINE | Facility: CLINIC | Age: 69
End: 2024-04-09
Payer: COMMERCIAL

## 2024-04-09 VITALS
OXYGEN SATURATION: 100 % | BODY MASS INDEX: 35.51 KG/M2 | WEIGHT: 208 LBS | HEIGHT: 64 IN | DIASTOLIC BLOOD PRESSURE: 88 MMHG | HEART RATE: 83 BPM | TEMPERATURE: 97.8 F | RESPIRATION RATE: 16 BRPM | SYSTOLIC BLOOD PRESSURE: 138 MMHG

## 2024-04-09 DIAGNOSIS — E66.812 CLASS 2 SEVERE OBESITY DUE TO EXCESS CALORIES WITH SERIOUS COMORBIDITY AND BODY MASS INDEX (BMI) OF 35.0 TO 35.9 IN ADULT (H): ICD-10-CM

## 2024-04-09 DIAGNOSIS — R92.30 DENSE BREASTS: ICD-10-CM

## 2024-04-09 DIAGNOSIS — L08.9 INFECTED EPIDERMOID CYST: ICD-10-CM

## 2024-04-09 DIAGNOSIS — Z12.31 ENCOUNTER FOR SCREENING MAMMOGRAM FOR BREAST CANCER: ICD-10-CM

## 2024-04-09 DIAGNOSIS — Z13.0 SCREENING FOR DEFICIENCY ANEMIA: ICD-10-CM

## 2024-04-09 DIAGNOSIS — E55.9 VITAMIN D DEFICIENCY: ICD-10-CM

## 2024-04-09 DIAGNOSIS — E66.01 CLASS 2 SEVERE OBESITY DUE TO EXCESS CALORIES WITH SERIOUS COMORBIDITY AND BODY MASS INDEX (BMI) OF 35.0 TO 35.9 IN ADULT (H): ICD-10-CM

## 2024-04-09 DIAGNOSIS — N62 MACROMASTIA: ICD-10-CM

## 2024-04-09 DIAGNOSIS — L72.0 INFECTED EPIDERMOID CYST: ICD-10-CM

## 2024-04-09 DIAGNOSIS — E11.9 TYPE 2 DIABETES MELLITUS WITHOUT COMPLICATION, WITHOUT LONG-TERM CURRENT USE OF INSULIN (H): ICD-10-CM

## 2024-04-09 DIAGNOSIS — E78.5 HYPERLIPIDEMIA LDL GOAL <100: ICD-10-CM

## 2024-04-09 DIAGNOSIS — Z00.00 ENCOUNTER FOR MEDICARE ANNUAL WELLNESS EXAM: Primary | ICD-10-CM

## 2024-04-09 DIAGNOSIS — I10 HYPERTENSION GOAL BP (BLOOD PRESSURE) < 130/80: ICD-10-CM

## 2024-04-09 LAB
ERYTHROCYTE [DISTWIDTH] IN BLOOD BY AUTOMATED COUNT: 12.7 % (ref 10–15)
HBA1C MFR BLD: 6.5 % (ref 0–5.6)
HCT VFR BLD AUTO: 36 % (ref 35–47)
HGB BLD-MCNC: 12.2 G/DL (ref 11.7–15.7)
MCH RBC QN AUTO: 29.8 PG (ref 26.5–33)
MCHC RBC AUTO-ENTMCNC: 33.9 G/DL (ref 31.5–36.5)
MCV RBC AUTO: 88 FL (ref 78–100)
PLATELET # BLD AUTO: 284 10E3/UL (ref 150–450)
RBC # BLD AUTO: 4.09 10E6/UL (ref 3.8–5.2)
WBC # BLD AUTO: 7.1 10E3/UL (ref 4–11)

## 2024-04-09 PROCEDURE — 82607 VITAMIN B-12: CPT | Performed by: NURSE PRACTITIONER

## 2024-04-09 PROCEDURE — 85027 COMPLETE CBC AUTOMATED: CPT | Performed by: NURSE PRACTITIONER

## 2024-04-09 PROCEDURE — 99214 OFFICE O/P EST MOD 30 MIN: CPT | Mod: 25 | Performed by: NURSE PRACTITIONER

## 2024-04-09 PROCEDURE — 99207 PR FOOT EXAM NO CHARGE: CPT | Performed by: NURSE PRACTITIONER

## 2024-04-09 PROCEDURE — 36415 COLL VENOUS BLD VENIPUNCTURE: CPT | Performed by: NURSE PRACTITIONER

## 2024-04-09 PROCEDURE — 82306 VITAMIN D 25 HYDROXY: CPT | Performed by: NURSE PRACTITIONER

## 2024-04-09 PROCEDURE — 80053 COMPREHEN METABOLIC PANEL: CPT | Performed by: NURSE PRACTITIONER

## 2024-04-09 PROCEDURE — 99397 PER PM REEVAL EST PAT 65+ YR: CPT | Performed by: NURSE PRACTITIONER

## 2024-04-09 PROCEDURE — 83036 HEMOGLOBIN GLYCOSYLATED A1C: CPT | Performed by: NURSE PRACTITIONER

## 2024-04-09 PROCEDURE — 82043 UR ALBUMIN QUANTITATIVE: CPT | Performed by: NURSE PRACTITIONER

## 2024-04-09 PROCEDURE — 80061 LIPID PANEL: CPT | Performed by: NURSE PRACTITIONER

## 2024-04-09 PROCEDURE — 82570 ASSAY OF URINE CREATININE: CPT | Performed by: NURSE PRACTITIONER

## 2024-04-09 PROCEDURE — 84443 ASSAY THYROID STIM HORMONE: CPT | Performed by: NURSE PRACTITIONER

## 2024-04-09 ASSESSMENT — ACTIVITIES OF DAILY LIVING (ADL): CURRENT_FUNCTION: NO ASSISTANCE NEEDED

## 2024-04-09 NOTE — PROGRESS NOTES
Assessment & Plan     Encounter for Medicare annual wellness exam  Well woman exam with breast exam completed today.    Fasting labs today.    Will notify of lab results.   - REVIEW OF HEALTH MAINTENANCE PROTOCOL ORDERS    Hypertension goal BP (blood pressure) < 130/80  No concerns.  Stable.  Continue same medication this was refilled today.   - COMPREHENSIVE METABOLIC PANEL  - amLODIPine (NORVASC) 2.5 MG tablet  Dispense: 90 tablet; Refill: 4  - lisinopril-hydrochlorothiazide (ZESTORETIC) 20-12.5 MG tablet  Dispense: 180 tablet; Refill: 4  - COMPREHENSIVE METABOLIC PANEL    Type 2 diabetes mellitus without complication, without long-term current use of insulin (H)  No concerns.  Stable.  Continue same medication this was refilled today.   - Lipid panel reflex to direct LDL Non-fasting  - Albumin Random Urine Quantitative with Creat Ratio  - Vitamin B12  - TSH WITH FREE T4 REFLEX  - FOOT EXAM  - metFORMIN (GLUCOPHAGE XR) 500 MG 24 hr tablet  Dispense: 360 tablet; Refill: 4  - Hemoglobin A1c  - Lipid panel reflex to direct LDL Non-fasting  - Albumin Random Urine Quantitative with Creat Ratio  - Vitamin B12  - TSH WITH FREE T4 REFLEX  - Hemoglobin A1c    Class 2 severe obesity due to excess calories with serious comorbidity and body mass index (BMI) of 35.0 to 35.9 in adult (H)      Hyperlipidemia LDL goal <100  No concerns.  Stable.  Continue same medication this was refilled today.   - atorvastatin (LIPITOR) 40 MG tablet  Dispense: 90 tablet; Refill: 4    Infected epidermoid cyst  Return to surgeon prn.     Dense breasts      Vitamin D deficiency    - Vitamin D Deficiency  - Vitamin D Deficiency    Macromastia  Recommend breast reduction.   - Adult Plastic Surgery  Referral    Screening for deficiency anemia    - CBC with platelets  - CBC with platelets    Encounter for screening mammogram for breast cancer    - MA Screen Bilateral w/Joaquín      Patient has been advised of split billing requirements and  "indicates understanding: Yes      BMI  Estimated body mass index is 35.7 kg/m  as calculated from the following:    Height as of this encounter: 1.626 m (5' 4\").    Weight as of this encounter: 94.3 kg (208 lb).   Weight management plan: Discussed healthy diet and exercise guidelines    Counseling  Appropriate preventive services were discussed with this patient.    Return in about 1 year (around 4/9/2025) for Wellness exam fasting labs.     Subjective   Janelle Dickinson is a 68 year old, presenting for the following health issues:  Physical      4/9/2024     8:38 AM   Additional Questions   Roomed by jennifer dale     Healthy Habits:     In general, how would you rate your overall health?  Good    Frequency of exercise:  2-3 days/week    Duration of exercise:  15-30 minutes    Do you usually eat at least 4 servings of fruit and vegetables a day, include whole grains    & fiber and avoid regularly eating high fat or \"junk\" foods?  No    Taking medications regularly:  Yes    Barriers to taking medications:  None    Medication side effects:  None    Ability to successfully perform activities of daily living:  No assistance needed    Home Safety:  No safety concerns identified    Hearing Impairment:  No hearing concerns    In the past 6 months, have you been bothered by leaking of urine?  No    In general, how would you rate your overall mental or emotional health?  Very good    Additional concerns today:  Yes (check wound from surgery)       Very large dense breasts with pain in shoulder back     Infected epidermoid cyst taken care of with surgeon.    Diabetes Follow-up    How often are you checking your blood sugar? A few times a month  What time of day are you checking your blood sugars (select all that apply)?  Before meals  Have you had any blood sugars above 200?  No  Have you had any blood sugars below 70?  No  What symptoms do you notice when your blood sugar is low?  None and Not applicable  What concerns do you have today " "about your diabetes? None   Do you have any of these symptoms? (Select all that apply)  No numbness or tingling in feet.  No redness, sores or blisters on feet.  No complaints of excessive thirst.  No reports of blurry vision.  No significant changes to weight.      Hyperlipidemia Follow-Up    Are you regularly taking any medication or supplement to lower your cholesterol?   Yes- atorvastatin  Are you having muscle aches or other side effects that you think could be caused by your cholesterol lowering medication?  Yes- calf muscles    Hypertension Follow-up    Do you check your blood pressure regularly outside of the clinic? No   Are you following a low salt diet? No  Are your blood pressures ever more than 140 on the top number (systolic) OR more   than 90 on the bottom number (diastolic), for example 140/90? na    BP Readings from Last 2 Encounters:   04/09/24 138/88   03/08/24 134/82     Hemoglobin A1C (%)   Date Value   11/01/2023 6.5 (H)   03/24/2023 6.3 (H)   12/21/2020 6.3 (H)   06/18/2020 6.4 (H)     LDL Cholesterol Calculated (mg/dL)   Date Value   03/24/2023 57   02/01/2022 52   12/21/2020 46   10/25/2019 56       Constitutional, HEENT, cardiovascular, pulmonary, GI, , musculoskeletal, neuro, skin, endocrine and psych systems are negative, except as otherwise noted in the HPI.       Objective    /88   Pulse 83   Temp 97.8  F (36.6  C)   Resp 16   Ht 1.626 m (5' 4\")   Wt 94.3 kg (208 lb)   SpO2 100%   BMI 35.70 kg/m    Body mass index is 35.7 kg/m .  Physical Exam   GENERAL: alert and no distress  EYES: Eyes grossly normal to inspection, PERRL and conjunctivae and sclerae normal  HENT: ear canals and TM's normal, nose and mouth without ulcers or lesions  NECK: no adenopathy, no asymmetry, masses, or scars  RESP: lungs clear to auscultation - no rales, rhonchi or wheezes  BREAST: normal without masses, tenderness or nipple discharge and no palpable axillary masses or adenopathy  CV: regular " rate and rhythm, normal S1 S2, no S3 or S4, no murmur, click or rub, no peripheral edema  ABDOMEN: soft, nontender, no hepatosplenomegaly, no masses and bowel sounds normal  MS: no gross musculoskeletal defects noted, no edema  SKIN: no suspicious lesions or rashes  NEURO: Normal strength and tone, mentation intact and speech normal  PSYCH: mentation appears normal, affect normal/bright       Signed Electronically by: YESSI Crews CNP

## 2024-04-10 LAB
ALBUMIN SERPL BCG-MCNC: 4.5 G/DL (ref 3.5–5.2)
ALP SERPL-CCNC: 58 U/L (ref 40–150)
ALT SERPL W P-5'-P-CCNC: 27 U/L (ref 0–50)
ANION GAP SERPL CALCULATED.3IONS-SCNC: 13 MMOL/L (ref 7–15)
AST SERPL W P-5'-P-CCNC: 22 U/L (ref 0–45)
BILIRUB SERPL-MCNC: 0.4 MG/DL
BUN SERPL-MCNC: 10.1 MG/DL (ref 8–23)
CALCIUM SERPL-MCNC: 9.6 MG/DL (ref 8.8–10.2)
CHLORIDE SERPL-SCNC: 91 MMOL/L (ref 98–107)
CHOLEST SERPL-MCNC: 118 MG/DL
CREAT SERPL-MCNC: 0.69 MG/DL (ref 0.51–0.95)
CREAT UR-MCNC: 32 MG/DL
DEPRECATED HCO3 PLAS-SCNC: 28 MMOL/L (ref 22–29)
EGFRCR SERPLBLD CKD-EPI 2021: >90 ML/MIN/1.73M2
FASTING STATUS PATIENT QL REPORTED: YES
GLUCOSE SERPL-MCNC: 106 MG/DL (ref 70–99)
HDLC SERPL-MCNC: 55 MG/DL
LDLC SERPL CALC-MCNC: 45 MG/DL
MICROALBUMIN UR-MCNC: <12 MG/L
MICROALBUMIN/CREAT UR: NORMAL MG/G{CREAT}
NONHDLC SERPL-MCNC: 63 MG/DL
POTASSIUM SERPL-SCNC: 4.2 MMOL/L (ref 3.4–5.3)
PROT SERPL-MCNC: 7.1 G/DL (ref 6.4–8.3)
SODIUM SERPL-SCNC: 132 MMOL/L (ref 135–145)
TRIGL SERPL-MCNC: 90 MG/DL
TSH SERPL DL<=0.005 MIU/L-ACNC: 1.87 UIU/ML (ref 0.3–4.2)
VIT B12 SERPL-MCNC: 310 PG/ML (ref 232–1245)
VIT D+METAB SERPL-MCNC: 39 NG/ML (ref 20–50)

## 2024-04-18 DIAGNOSIS — I10 HYPERTENSION GOAL BP (BLOOD PRESSURE) < 130/80: ICD-10-CM

## 2024-04-18 RX ORDER — LISINOPRIL AND HYDROCHLOROTHIAZIDE 12.5; 2 MG/1; MG/1
2 TABLET ORAL DAILY
Qty: 180 TABLET | Refills: 0 | Status: SHIPPED | OUTPATIENT
Start: 2024-04-18

## 2024-04-18 RX ORDER — AMLODIPINE BESYLATE 2.5 MG/1
2.5 TABLET ORAL EVERY MORNING
Qty: 90 TABLET | Refills: 0 | Status: SHIPPED | OUTPATIENT
Start: 2024-04-18

## 2024-04-22 ENCOUNTER — NURSE TRIAGE (OUTPATIENT)
Dept: NURSING | Facility: CLINIC | Age: 69
End: 2024-04-22
Payer: COMMERCIAL

## 2024-04-22 ENCOUNTER — DOCUMENTATION ONLY (OUTPATIENT)
Dept: OTHER | Facility: CLINIC | Age: 69
End: 2024-04-22
Payer: COMMERCIAL

## 2024-04-23 NOTE — TELEPHONE ENCOUNTER
Nurse Triage SBAR    Is this a 2nd Level Triage? YES, LICENSED PRACTITIONER REVIEW IS REQUIRED    Situation: . double dose of Metformin, lisinopril, and Lipitor    Background: Patient took a double dose of Metformin, lisinopril, and Lipitor.  Patient noticed that Tuesday was already missing from her pill sorter, patient doesn't remember taking the double dose and states that she either did it last night or tonight. Patient denies symptoms    Assessment: double dose of Metformin, lisinopril, Lipitor.    Protocol Recommended Disposition:   Call PCP Now    Recommendation: Contact poison control, stay well hydrated due to the double dose of diuretic.         Paged to provider  Andrew Martin @ 2011; call returned 2012    Does the patient meet one of the following criteria for ADS visit consideration? No    Reason for Disposition   [1] DOUBLE DOSE (an extra dose or lesser amount) of prescription drug AND [2] NO symptoms  (Exception: A double dose of antibiotics.)    Protocols used: Medication Question Call-A-

## 2024-05-05 ENCOUNTER — HEALTH MAINTENANCE LETTER (OUTPATIENT)
Age: 69
End: 2024-05-05

## 2024-05-09 RX ORDER — ATORVASTATIN CALCIUM 40 MG/1
40 TABLET, FILM COATED ORAL DAILY
Qty: 90 TABLET | Refills: 4 | Status: SHIPPED | OUTPATIENT
Start: 2024-05-09

## 2024-05-09 RX ORDER — AMLODIPINE BESYLATE 2.5 MG/1
2.5 TABLET ORAL EVERY MORNING
Qty: 90 TABLET | Refills: 4 | Status: SHIPPED | OUTPATIENT
Start: 2024-05-09

## 2024-05-09 RX ORDER — LISINOPRIL AND HYDROCHLOROTHIAZIDE 12.5; 2 MG/1; MG/1
2 TABLET ORAL DAILY
Qty: 180 TABLET | Refills: 4 | Status: SHIPPED | OUTPATIENT
Start: 2024-05-09

## 2024-05-09 RX ORDER — METFORMIN HCL 500 MG
2000 TABLET, EXTENDED RELEASE 24 HR ORAL
Qty: 360 TABLET | Refills: 4 | Status: SHIPPED | OUTPATIENT
Start: 2024-05-09

## 2024-05-09 NOTE — RESULT ENCOUNTER NOTE
Dear Janelle Dickinson,    Here is a summary of your recent test results:    Labs overall look good.     B12 is low. I would recommend daily Vitamin B12  1000 mcg to help with this.   -Sodium is decreased.  ADVISE: decrease water intake   -Glucose is elevated due to your diabetes.  -A1C (test of diabetes control the last 2-3 months) is at your goal. Please continue with your current plan. Also, you should recheck your A1C test in 6 months.     For additional lab test information, labtestsonline.org is an excellent reference.    Please call us at [phone] (or use [employer]) to address the above recommendations if needed.    Thank you for choosing [facility].  It was an honor and a privilege to participate in your care.       Healthy regards,    Georgina Caputo, ROSS  [facility]

## 2024-05-13 NOTE — PROGRESS NOTES
Gen Surgery Consultation    67 yo female with right abdominal wall cyst  Recent I&D performed at other facility  Looking improved   Continue dressing changes and abx  Will plan for formal excision in few weeks  Discussed reason for waiting for formal excision, signs/symptoms to be watchful for. Answered questions and will get set up for office procedure beginning of March.

## 2024-05-15 NOTE — TELEPHONE ENCOUNTER
FUTURE VISIT INFORMATION      FUTURE VISIT INFORMATION:  Date: 6/26/24  Time: 3:45pm  [place] [facility] [place]ERRAL INFORMATION  Referring provider:  Georgina Caputo APRN CNP   Referring providers clinic:   FAMILY PRACTICE   Reason for visit/diagnosis  breast reduction    RECORDS REQUESTED FROM:       Clinic name Comments Records Status Imaging Status    FAMILY PRACTICE  Referral 5/9/24 [facility]    Imaging MA done 10/16/23  MA 7/14/22 EPIC

## 2024-06-06 ENCOUNTER — TELEPHONE (OUTPATIENT)
Dept: PLASTIC SURGERY | Facility: CLINIC | Age: 69
End: 2024-06-06
Payer: COMMERCIAL

## 2024-06-06 NOTE — PROGRESS NOTES
General Surgery Operative Note      Pre-operative diagnosis: Right abdomen epidermal cyst   Post-operative diagnosis: Same    Procedure: Excision of right abdomen epidermal cyst    Surgeon: Den Novak MD   Assistant(s): NONE   Anesthesia: Local    Estimated blood loss: 3 cc     Specimens: Right abdomen cyst       The right abdomen was prepped and draped in standard sterile fashion.  The skin overlying the mass was anesthetized with local anesthetic.  An ellipse of skin, incorporating the central punctum was made with a length of 3 cm.  The incision was carried into the subcutaneous tissue and the mass was completely excised from surrounding tissues using a combination of Bovie electrocautery and blunt dissection.  The mass, which measured [id number], was then passed off the field as specimen.  Hemostasis was maintained throughout with electrocautery.  The wound was then irrigated with sterile saline and closed with 4-0 Vicryl subcuticular sutures and dressed with glue.  The patient tolerated the procedure well.  Sponge and needle counts were correct at the end of the case.     Den Novak MD

## 2024-06-06 NOTE — TELEPHONE ENCOUNTER
"All patients:    Brigid, this is Hugo calling from the [facility] in [place]. I see you have an appointment with Dr. Chávez on 6/26/24 at 3:45 PM. I have a few questions regarding the surgery that you have a consult for so that your visit can be as productive as possible. Do you have a few minutes to answer them?    Reason for consult: Breast reduction    1. Are you a diabetic? Yes. If so, what was your most recent HgA1C? Around 6.5 in ~ March 2024    If >7, then the recommendation is to improve blood glucose control prior to consultation for elective plastic surgery. This requirement is aimed to minimize modifiable risk of post-operative complications such as wound healing issues and infection. Of note, if you have not had a recent HgA1C, we may ask to have it rechecked prior to consultation.     2. Are you an active smoker? No    If yes, then the recommendation is to quit smoking (including vaping and all nicotine replacement product) for at least 4-6 weeks prior to consult. The requirement for elective plastic surgery is smoking cessation for at least 4 weeks both pre- and post-operatively to minimize risks of complications like wound healing issues and infection. Of note, we may ask for patients to get urine tested for metabolites of nicotine both prior to scheduling surgery and/or within 2-3 weeks before surgery to ensure highest standard of safety for patients undergoing elective procedures. If you are struggling to quit smoking, we are happy to provide referral for smoking cessation counseling.      For body contouring surgery including tummy tuck consultation:    3. What is your most recent height and weight? *Enter in \"Patient Reported Vitals\" and record BMI here: 35.7 (for Dr. Chávez only)     If BMI >35, you will most likely not yet be a candidate for body contouring including abdominoplasty ( tummy tuck ) surgery. If you are actively trying to lose weight, we can provide some " basic dietary recommendations or we can place a referral to a dietician so that you have the best chance of achieving your weight goals and increasing the likelihood you are eligible for surgery.     4. Have you undergone gastric bypass surgery? No    If so, has your blood work including protein levels been recently checked? If not, we may ask that you get standard blood work including hemoglobin, albumin and prealbumin levels tested to ensure you are optimized for body contouring surgery. Generally, a hemoglobin level of 10, an albumin level of >3 and prealbumin level of >15 may be necessary to optimize you medically for surgery.     For breast or body contouring surgery consultation:     5. Are you currently losing weight? No    If no, have you been weight stable for at least 3 months at your current weight? Yes    If not, to ensure the best possible outcome for surgery, we recommend that you have been weight stable at your ideal weight prior to scheduling consultation for elective breast or body contouring plastic surgery. If you are actively trying to lose weight, we can provide some basic dietary recommendations or we can place a referral to a dietician so that you have the best chance of achieving your weight goals.    For breast reduction surgery consultation:    6. Has there been a conservative attempt for at least 6-12 weeks to treat symptomatic macromastia ( large breasts ) using non-surgical modalities? No. Patient expresses concern about curvature of spine and resulting back pain.    If not, insurance approval for breast reduction surgery is less likely and our recommendation is to consider this prior to consultation. Examples of conservative management for this includes physical therapy, massage, chiropractic treatment, use of prescription topical anti-fungal powder or ointment. If your attempt at conservative management has resulted in no durable relief, then you may be a candidate for breast reduction  surgery done on the basis of medical necessity. Otherwise, if not seeking insurance reimbursement for a breast reduction, you may not be required to undergo an attempt at conservative (non-surgical) management prior to becoming a surgical candidate.     7. Are you older than 40 years of age? Yes    Last mammography was 10/2023, upcoming screening 10/2024.    If so, are you current on your screening mammography? If not, we may ask that you undergo screening mammogram prior to surgical consult for breast reduction surgery. If you have a strong positive breast cancer family history, including a first degree relative with early breast cancer diagnosis, we may require earlier screening imaging prior to breast reduction surgery. Dr. Chávez may recommend preoperative baseline breast screening imaging in younger patients on the basis of both personal and family history of breast mass(es) including cancer.     Patient was given information on topics that may delay or prevent surgery upon request.    Thank you for your time, if there are concerns with the provider and appointment type that you are scheduled for, someone from the team will reach out to you.    Hugo Kramer on 6/6/2024 at 10:17 AM

## 2024-06-09 ENCOUNTER — OFFICE VISIT (OUTPATIENT)
Dept: URGENT CARE | Facility: URGENT CARE | Age: 69
End: 2024-06-09
Payer: COMMERCIAL

## 2024-06-09 VITALS
HEART RATE: 87 BPM | OXYGEN SATURATION: 97 % | TEMPERATURE: 98.5 F | WEIGHT: 209.9 LBS | DIASTOLIC BLOOD PRESSURE: 99 MMHG | BODY MASS INDEX: 36.03 KG/M2 | RESPIRATION RATE: 16 BRPM | SYSTOLIC BLOOD PRESSURE: 157 MMHG

## 2024-06-09 DIAGNOSIS — L20.9 ATOPIC DERMATITIS, UNSPECIFIED TYPE: Primary | ICD-10-CM

## 2024-06-09 PROCEDURE — 99214 OFFICE O/P EST MOD 30 MIN: CPT | Performed by: PREVENTIVE MEDICINE

## 2024-06-09 RX ORDER — TRIAMCINOLONE ACETONIDE 1 MG/G
OINTMENT TOPICAL 2 TIMES DAILY
Qty: 30 G | Refills: 0 | Status: SHIPPED | OUTPATIENT
Start: 2024-06-09 | End: 2024-06-23

## 2024-06-09 NOTE — PROGRESS NOTES
"Assessment & Plan     (L20.9) Atopic dermatitis, unspecified type  (primary encounter diagnosis)  Plan: triamcinolone (KENALOG) 0.1 % external ointment    Consistent with atopic dermatitis.  Triamcinolone ointment two times per day for 2 weeks  Follow up in 2 weeks for recheck            31 minutes spent by me on the date of the encounter doing chart review, history and exam, documentation and further activities per the note        No follow-ups on file.    Cali Matthews MD, MD  [facility]     Janelle Dickinson is a 68 year old female who presents to clinic today for the following health issues:  Chief Complaint   Patient presents with    Derm Problem     67 yo F presents with the following complaint left  side patch 2\" in size, bumpy, itchy, red along waist band onset  late winter assumed it was dry skin       HPI    This is a 67 yo female with skin irritation at the left waist for a few months.  Also now on the flexor surface of her left elbow.   Both areas are itchy.    Review of Systems  Constitutional, HEENT, cardiovascular, pulmonary, GI, , musculoskeletal, neuro, skin, endocrine and psych systems are negative, except as otherwise noted.      Objective    BP (!) 157/99   Pulse 87   Temp 98.5  F (36.9  C)   Resp 16   Wt 95.2 kg (209 lb 14.4 oz)   SpO2 97%   BMI 36.03 kg/m    Physical Exam   GENERAL: alert and no distress  EYES: Eyes grossly normal to inspection, PERRL and conjunctivae and sclerae normal  HENT: ear canals and TM's normal, nose and mouth without ulcers or lesions  NECK: no adenopathy, no asymmetry, masses, or scars  RESP: lungs clear to auscultation - no rales, rhonchi or wheezes  CV: regular rate and rhythm, normal S1 S2, no S3 or S4, no murmur, click or rub, no peripheral edema  ABDOMEN: soft, nontender, no hepatosplenomegaly, no masses and bowel sounds normal  MS: no gross musculoskeletal defects noted, no edema  SKIN: no suspicious " lesions or rashes except red patch on left waist and smaller red patch on left flexural elbow.  NEURO: Normal strength and tone, mentation intact and speech normal  PSYCH: mentation appears normal, affect normal/bright

## 2024-06-26 ENCOUNTER — OFFICE VISIT (OUTPATIENT)
Dept: PLASTIC SURGERY | Facility: CLINIC | Age: 69
End: 2024-06-26
Attending: NURSE PRACTITIONER
Payer: COMMERCIAL

## 2024-06-26 ENCOUNTER — PRE VISIT (OUTPATIENT)
Dept: PLASTIC SURGERY | Facility: CLINIC | Age: 69
End: 2024-06-26

## 2024-06-26 VITALS
WEIGHT: 208 LBS | SYSTOLIC BLOOD PRESSURE: 189 MMHG | HEART RATE: 87 BPM | DIASTOLIC BLOOD PRESSURE: 78 MMHG | HEIGHT: 64 IN | OXYGEN SATURATION: 98 % | BODY MASS INDEX: 35.51 KG/M2

## 2024-06-26 DIAGNOSIS — N62 MACROMASTIA: ICD-10-CM

## 2024-06-26 PROCEDURE — 99204 OFFICE O/P NEW MOD 45 MIN: CPT | Performed by: STUDENT IN AN ORGANIZED HEALTH CARE EDUCATION/TRAINING PROGRAM

## 2024-06-26 ASSESSMENT — PAIN SCALES - GENERAL: PAINLEVEL: NO PAIN (0)

## 2024-06-26 NOTE — NURSING NOTE
"Chief Complaint   Patient presents with    Consult     Janelle Dickinson, is being seen today for a Consult for breast reduction, Per Pt       Vitals:    06/26/24 1533   BP: (!) 189/78   BP Location: Left arm   Patient Position: Chair   Cuff Size: Adult Regular   Pulse: 87   SpO2: 98%   Weight: 94.3 kg (208 lb)   Height: 1.626 m (5' 4\")       Body mass index is 35.7 kg/m .      Lucy Orona LPN    "

## 2024-06-26 NOTE — LETTER
"6/26/2024       RE: Janelle Bhat  [address] [address]         Dear Colleague,    Thank you for referring your patient, Janelle Bhat, to the [facility] [place] [facility]. Please see a copy of my visit note below.    PRS    HPI: 68 year-old female type II diabetic presenting with bilateral symptomatic macromastia. Patient has triple DDD cup sized breasts. She complains of problems with posture and is weighed down by the large breasts. She is most concerned that she cannot walk long distances or at a reasonable pace due to the weight of the breasts. She does not have upper back and neck pain. She does have shoulder bra strap grooving. She has moisture underneath the breasts and sometimes has skin breakdown. She also has had inter-mammary intertrigo and breakouts for many decades. She has difficulty with fitting clothing. She has not tried PT, massage, chiropractic treatment. She has not been prescribed antifungal topical ointment. She seeks breast reduction. No breast masses, lumps or nipple discharge. No breast cancer family history that is immediate. She is due for a mammogram in October.     ROS: Negative, see HPI  PMH: T2DM, HTN, HLD  PSH: None to the breasts, R breast biopsy 2 years ago, which was benign  Medications: Amlodipine, statin, lisinopril-hydrochlorothiazide, metformin  Allergies: Bactrim  SH: Nonsmoker, denies any tobacco or nicotine use  FH: No bleeding or clotting issues, or problems with anesthesia. No breast cancer.     Examination:   BP (!) 189/78 (BP Location: Left arm, Patient Position: Chair, Cuff Size: Adult Regular)   Pulse 87   Ht 1.626 m (5' 4\")   Wt 94.3 kg (208 lb)   SpO2 98%   BMI 35.70 kg/m    Nonlabored breathing  Not distressed  No breast masses, nipple discharge or axillary lymphadenopathy  Bilateral grade 3 ptotic breasts   Breast " measurements:  SNN: 39 cm bilaterally  BW: 20 cm on the L, 19.5 cm on the R  NIMF: 15.5 cm on the L, 17 cm on the R  NML: 18.5 cm on the L, 19 cm on the R  AD: 5 cm bilaterally    MMG 10/2023: BIRADS-2, benign    A/P: 68F p/w bilateral symptomatic macromastia    -Patient is a good candidate for bilateral breast reduction, but she would like to try PT. The plan would be for a inferior pedicle with inverted T skin resection.  The goals of breast reduction surgery include to alleviate the symptoms of macromastia, to make the breasts body appropriate and aesthetically appealing, and to not cause problems like wound healing issues or hematoma.  -Discussed the risks of surgery, including but not limited to: infection, bleeding, hematoma, seroma, poor scarring, wound healing issues, pain, nipple sensitivity issues or decreased sensation, loss of nipple areola, need for free nipple grafting, asymmetry, need for revision surgery, suboptimal aesthetic result, DVT, PE, death.  Despite these risks, patient would consent to a bilateral breast reduction.  -Photography today  -Phone visit after trial of 6-8 weeks of physical therapy  -A total of 45 minutes was devoted to review of chart, direct face-to-face patient counseling and documentation during this encounter, exclusive of any procedure performed.        Again, thank you for allowing me to participate in the care of your patient.      Sincerely,    Carlos Chávez MD

## 2024-06-27 ENCOUNTER — THERAPY VISIT (OUTPATIENT)
Dept: PHYSICAL THERAPY | Facility: CLINIC | Age: 69
End: 2024-06-27
Attending: STUDENT IN AN ORGANIZED HEALTH CARE EDUCATION/TRAINING PROGRAM
Payer: COMMERCIAL

## 2024-06-27 DIAGNOSIS — N62 MACROMASTIA: ICD-10-CM

## 2024-06-27 PROCEDURE — 97110 THERAPEUTIC EXERCISES: CPT | Mod: GP | Performed by: PHYSICAL THERAPIST

## 2024-06-27 PROCEDURE — 97161 PT EVAL LOW COMPLEX 20 MIN: CPT | Mod: GP | Performed by: PHYSICAL THERAPIST

## 2024-06-27 NOTE — PROGRESS NOTES
PHYSICAL THERAPY EVALUATION  Type of Visit: Evaluation       Fall Risk Screen:  Fall screen completed by: PT  Have you fallen 2 or more times in the past year?: No  Have you fallen and had an injury in the past year?: No  Is patient a fall risk?: No    Subjective       Presenting condition or subjective complaint: back strain/pain  Date of onset: 03/27/24    Relevant medical history: Diabetes; High blood pressure; Overweight   Dates & types of surgery: see [facility] record    Prior diagnostic imaging/testing results:       Prior therapy history for the same diagnosis, illness or injury:            Living Environment  Social support: Alone   [facility]; Multi-level   Stairs to enter the home: Yes       Ramp: No   Stairs inside the home: Yes 30 Is there a railing: Yes     Help at home: None  Equipment owned:       Employment:    retired  Hobbies/Interests:  walking    Patient goals for therapy: walk & move - typically i start slumping forward & have to force myself to straighten my posture - see referral from doctor    Pain assessment: Pain present  Location: low and upper back /Rating: rest= 0/10   worst=  4/10     Objective   CERVICAL:    Posture: rounded shoulders and forward head, very heavy chested (gets pulled forward)      AROM: (Major, Moderate, Minimal or Nil loss)  Movement Loss Orestes Mod Min Nil Pain   Protrusion        Flexion   X  none   Retraction        Extension   X  none   Left Rotation   X  none   Right Rotation   X  none   Left Side Bending        Right Side bending          Thoracic rotation: minor limit to the right; no limit on left    Assessment & Plan   CLINICAL IMPRESSIONS  Medical Diagnosis: Macromastia    Treatment Diagnosis: posture dysfunction   Impression/Assessment: Patient is a 68 year old female with low and upper back complaints.  The following significant findings have been identified: Pain, Decreased ROM/flexibility, Decreased strength, Inflammation, Impaired gait, Impaired  muscle performance, Decreased activity tolerance, Impaired posture, and Instability. These impairments interfere with their ability to perform self care tasks, recreational activities, household chores, household mobility, and community mobility as compared to previous level of function.     Clinical Decision Making (Complexity):  Clinical Presentation: Stable/Uncomplicated  Clinical Presentation Rationale: based on medical and personal factors listed in PT evaluation  Clinical Decision Making (Complexity): Low complexity    PLAN OF CARE  Treatment Interventions:  Interventions: Manual Therapy, Neuromuscular Re-education, Therapeutic Activity, Therapeutic Exercise    Long Term Goals     PT Goal 1  Goal Identifier: Goal 1  Goal Description: Pt will be able to sit, 30 min, painfree, at work  Rationale: to maximize safety and independence with performance of ADLs and functional tasks  Target Date: 09/19/24  PT Goal 2  Goal Identifier: Goal 2  Goal Description: Pt will be able to sleep through the night without waking in pain  Rationale: to maximize safety and independence with performance of ADLs and functional tasks  Target Date: 09/19/24      Frequency of Treatment: 1X/week  Duration of Treatment: 12 weeks      Education Assessment:   Learner/Method: Patient;Pictures/Video    Risks and benefits of evaluation/treatment have been explained.   Patient/Family/caregiver agrees with Plan of Care.     Evaluation Time:             Signing Clinician: Alyce Vergara, PT        [facility]                                                                                   OUTPATIENT PHYSICAL THERAPY      PLAN OF TREATMENT FOR OUTPATIENT REHABILITATION   Patient's Last Name, First Name, Janelle Ba YOB: 1955   Provider's Name   [facility]   Medical Record No.  [id number]     Onset Date: 03/27/24  Start of Care Date: 06/27/24     Medical  Diagnosis:  Macromastia      PT Treatment Diagnosis:  posture dysfunction Plan of Treatment  Frequency/Duration: 1X/week/ 12 weeks    Certification date from 06/27/24 to 09/19/24         See note for plan of treatment details and functional goals     Alyce Vergara, PT                         I CERTIFY THE NEED FOR THESE SERVICES FURNISHED UNDER        THIS PLAN OF TREATMENT AND WHILE UNDER MY CARE     (Physician attestation of this document indicates review and certification of the therapy plan).              Referring Provider:  Carlos Chávez    Initial Assessment  See Epic Evaluation- Start of Care Date: 06/27/24

## 2024-06-27 NOTE — PROGRESS NOTES
"PRS    HPI: 68 year-old female type II diabetic presenting with bilateral symptomatic macromastia. Patient has triple DDD cup sized breasts. She complains of problems with posture and is weighed down by the large breasts. She is most concerned that she cannot walk long distances or at a reasonable pace due to the weight of the breasts. She does not have upper back and neck pain. She does have shoulder bra strap grooving. She has moisture underneath the breasts and sometimes has skin breakdown. She also has had inter-mammary intertrigo and breakouts for many decades. She has difficulty with fitting clothing. She has not tried PT, massage, chiropractic treatment. She has not been prescribed antifungal topical ointment. She seeks breast reduction. No breast masses, lumps or nipple discharge. No breast cancer family history that is immediate. She is due for a mammogram in October.     ROS: Negative, see HPI  PMH: T2DM, HTN, HLD  PSH: None to the breasts, R breast biopsy 2 years ago, which was benign  Medications: Amlodipine, statin, lisinopril-hydrochlorothiazide, metformin  Allergies: Bactrim  SH: Nonsmoker, denies any tobacco or nicotine use  FH: No bleeding or clotting issues, or problems with anesthesia. No breast cancer.     Examination:   BP (!) 189/78 (BP Location: Left arm, Patient Position: Chair, Cuff Size: Adult Regular)   Pulse 87   Ht 1.626 m (5' 4\")   Wt 94.3 kg (208 lb)   SpO2 98%   BMI 35.70 kg/m    Nonlabored breathing  Not distressed  No breast masses, nipple discharge or axillary lymphadenopathy  Bilateral grade 3 ptotic breasts   Breast measurements:  SNN: 39 cm bilaterally  BW: 20 cm on the L, 19.5 cm on the R  NIMF: 15.5 cm on the L, 17 cm on the R  NML: 18.5 cm on the L, 19 cm on the R  AD: 5 cm bilaterally    MMG 10/2023: BIRADS-2, benign    A/P: 68F p/w bilateral symptomatic macromastia    -Patient is a good candidate for bilateral breast reduction, but she would like to try PT. The plan " would be for a inferior pedicle with inverted T skin resection.  The goals of breast reduction surgery include to alleviate the symptoms of macromastia, to make the breasts body appropriate and aesthetically appealing, and to not cause problems like wound healing issues or hematoma.  -Discussed the risks of surgery, including but not limited to: infection, bleeding, hematoma, seroma, poor scarring, wound healing issues, pain, nipple sensitivity issues or decreased sensation, loss of nipple areola, need for free nipple grafting, asymmetry, need for revision surgery, suboptimal aesthetic result, DVT, PE, death.  Despite these risks, patient would consent to a bilateral breast reduction.  -Photography today  -Phone visit after trial of 6-8 weeks of physical therapy  -A total of 45 minutes was devoted to review of chart, direct face-to-face patient counseling and documentation during this encounter, exclusive of any procedure performed.    Carlos Chávez MD, PhD

## 2024-07-22 ENCOUNTER — THERAPY VISIT (OUTPATIENT)
Dept: PHYSICAL THERAPY | Facility: CLINIC | Age: 69
End: 2024-07-22
Attending: STUDENT IN AN ORGANIZED HEALTH CARE EDUCATION/TRAINING PROGRAM
Payer: COMMERCIAL

## 2024-07-22 DIAGNOSIS — M54.2 CERVICALGIA: ICD-10-CM

## 2024-07-22 DIAGNOSIS — N62 MACROMASTIA: Primary | ICD-10-CM

## 2024-07-22 PROCEDURE — 97110 THERAPEUTIC EXERCISES: CPT | Mod: GP | Performed by: PHYSICAL THERAPIST

## 2024-08-19 ENCOUNTER — THERAPY VISIT (OUTPATIENT)
Dept: PHYSICAL THERAPY | Facility: CLINIC | Age: 69
End: 2024-08-19
Payer: COMMERCIAL

## 2024-08-19 DIAGNOSIS — M54.2 CERVICALGIA: ICD-10-CM

## 2024-08-19 DIAGNOSIS — N62 MACROMASTIA: Primary | ICD-10-CM

## 2024-08-19 PROCEDURE — 97110 THERAPEUTIC EXERCISES: CPT | Mod: GP | Performed by: PHYSICAL THERAPIST

## 2024-09-05 ENCOUNTER — THERAPY VISIT (OUTPATIENT)
Dept: PHYSICAL THERAPY | Facility: CLINIC | Age: 69
End: 2024-09-05
Payer: COMMERCIAL

## 2024-09-05 DIAGNOSIS — N62 MACROMASTIA: Primary | ICD-10-CM

## 2024-09-05 DIAGNOSIS — M54.2 CERVICALGIA: ICD-10-CM

## 2024-09-05 PROCEDURE — 97110 THERAPEUTIC EXERCISES: CPT | Mod: GP | Performed by: PHYSICAL THERAPIST

## 2024-10-04 ENCOUNTER — THERAPY VISIT (OUTPATIENT)
Dept: PHYSICAL THERAPY | Facility: CLINIC | Age: 69
End: 2024-10-04
Payer: COMMERCIAL

## 2024-10-04 DIAGNOSIS — N62 MACROMASTIA: Primary | ICD-10-CM

## 2024-10-04 DIAGNOSIS — M54.2 CERVICALGIA: ICD-10-CM

## 2024-10-04 PROCEDURE — 97110 THERAPEUTIC EXERCISES: CPT | Mod: GP | Performed by: PHYSICAL THERAPIST

## 2024-10-04 NOTE — PROGRESS NOTES
10/04/24 0500   Appointment Info   Signing clinician's name / credentials Alyce Vergara, PT, SCS   Total/Authorized Visits [employer]   Visits Used 5   Medical Diagnosis Macromastia   PT Tx Diagnosis posture dysfunction   Progress Note/Certification   Start of Care Date 06/27/24   Onset of illness/injury or Date of Surgery 03/27/24   Therapy Frequency 1X/every other week   Predicted Duration 12 weeks   Certification date from 09/19/24   Certification date to 12/12/24   Progress Note Due Date 12/12/24   Progress Note Completed Date 10/04/24   GOALS   PT Goals 2   PT Goal 1   Goal Identifier Goal 1   Goal Description Pt will be able to sit, 30 min, painfree, at work   Rationale to maximize safety and independence with performance of ADLs and functional tasks   Goal Progress improving   Target Date 12/12/24   PT Goal 2   Goal Identifier Goal 2   Goal Description Pt will be able to sleep through the night without waking in pain   Rationale to maximize safety and independence with performance of ADLs and functional tasks   Goal Progress improving; can wake with cramps   Target Date 12/12/24   Subjective Report   Subjective Report Pt notes her home program is going well. Re-correcting posture several times/day (overall improving).   Objective Measures   Objective Measures Objective Measure 1;Objective Measure 2   Objective Measure 1   Objective Measure AROM lumbar   Details flexion= nil (some tightness in legs); extension= mod limit (no pain);  SG bilaterally= mod limit (no pain)   Objective Measure 2   Objective Measure Posture   Details improving sitting and standing posture   Treatment Interventions (PT)   Interventions Therapeutic Procedure/Exercise   Therapeutic Procedure/Exercise   Therapeutic Procedures: strength, endurance, ROM, flexibility minutes ([id number]) 40   Therapeutic Procedures Ther Proc 2   Ther Proc 1 Pt education   Ther Proc 1 - Details POC, frequency of exercises; posture checks, supportive sports like bra or  posture correcting brace/straps   Ther Proc 2 Nustep:   Ther Proc 2 - Details 5 min(level 3)   PTRx Ther Proc 1 Shoulder Flexion Stretch with Stick   PTRx Ther Proc 1 - Details X10   PTRx Ther Proc 2 Towel Stretch Gastroc   PTRx Ther Proc 2 - Details No Notes   PTRx Ther Proc 3 Bridging #1   PTRx Ther Proc 3 - Details X5 sec x10   PTRx Ther Proc 4 Standing Extension   PTRx Ther Proc 4 - Details X10   PTRx Ther Proc 5 Wall Stand for Posture   PTRx Ther Proc 5 - Details 5 min   PTRx Ther Proc 6 Pec Stretch Doorway   PTRx Ther Proc 6 - Details X10 sec X5   PTRx Ther Proc 7 Cervical Retraction With Patient Overpressure   PTRx Ther Proc 7 - Details X10   PTRx Ther Proc 8 Scapular Retraction/Depression   PTRx Ther Proc 8 - Details X10   PTRx Ther Proc 9 Shoulder Theraband External Rotation   PTRx Ther Proc 9 - Details RTB: X20 each   PTRx Ther Proc 10 Shoulder Theraband Rows   PTRx Ther Proc 10 - Details RTB: X20   PTRx Ther Proc 11 Shoulder Theraband Low Row/Pulldown   PTRx Ther Proc 11 - Details RTB: X20   Skilled Intervention instructed on exercises to improve posture and pain   Education   Learner/Method Patient;Pictures/Video   Plan   Home program See PTRx   Plan for next session re assess and progress as tolerated       [facility]                                                                                   OUTPATIENT PHYSICAL THERAPY    PLAN OF TREATMENT FOR OUTPATIENT REHABILITATION   Patient's Last Name, First Name, MARY ELLENNainJanelle Beebe YOB: 1955   Provider's Name   [facility]   Medical Record No.  [id number]     Onset Date: 03/27/24  Start of Care Date: 06/27/24     Medical Diagnosis:  Macromastia      PT Treatment Diagnosis:  posture dysfunction Plan of Treatment  Frequency/Duration: 1X/every other week/ 12 weeks    Certification date from 09/19/24 to 12/12/24         See note for plan of treatment details and functional  goals     Alyce Vergara, PT                         I CERTIFY THE NEED FOR THESE SERVICES FURNISHED UNDER        THIS PLAN OF TREATMENT AND WHILE UNDER MY CARE     (Physician attestation of this document indicates review and certification of the therapy plan).              Referring Provider:  Carlos Chávez    Initial Assessment  See Epic Evaluation- Start of Care Date: 06/27/24

## 2024-10-15 ENCOUNTER — OFFICE VISIT (OUTPATIENT)
Dept: FAMILY MEDICINE | Facility: CLINIC | Age: 69
End: 2024-10-15
Payer: COMMERCIAL

## 2024-10-15 VITALS
WEIGHT: 207.5 LBS | BODY MASS INDEX: 35.42 KG/M2 | SYSTOLIC BLOOD PRESSURE: 132 MMHG | HEIGHT: 64 IN | OXYGEN SATURATION: 99 % | DIASTOLIC BLOOD PRESSURE: 82 MMHG | HEART RATE: 74 BPM | TEMPERATURE: 99.4 F | RESPIRATION RATE: 16 BRPM

## 2024-10-15 DIAGNOSIS — E11.9 TYPE 2 DIABETES MELLITUS WITHOUT COMPLICATION, WITHOUT LONG-TERM CURRENT USE OF INSULIN (H): ICD-10-CM

## 2024-10-15 DIAGNOSIS — B35.6 TINEA CRURIS: Primary | ICD-10-CM

## 2024-10-15 PROCEDURE — 99213 OFFICE O/P EST LOW 20 MIN: CPT | Performed by: FAMILY MEDICINE

## 2024-10-15 RX ORDER — CLOTRIMAZOLE 1 %
CREAM (GRAM) TOPICAL 2 TIMES DAILY
Qty: 60 G | Refills: 1 | Status: SHIPPED | OUTPATIENT
Start: 2024-10-15

## 2024-10-15 NOTE — PROGRESS NOTES
"  Assessment & Plan     Tinea cruris  Start topical clotrimazole. If not improving, follow up with dermatology.   - clotrimazole (LOTRIMIN) 1 % external cream; Apply topically 2 times daily.  - Adult Dermatology  Referral; Future    Type 2 diabetes mellitus without complication, without long-term current use of insulin (H)  Well controlled.          BMI  Estimated body mass index is 35.62 kg/m  as calculated from the following:    Height as of this encounter: 1.626 m (5' 4\").    Weight as of this encounter: 94.1 kg (207 lb 8 oz).       Subjective   Janelle Dickinson is a 68 year old, presenting for the following health issues:  Derm Problem        10/15/2024     3:09 PM   Additional Questions   Roomed by Oliva ACUNA CMA     History of Present Illness       Reason for visit:  Skin issues    She eats 2-3 servings of fruits and vegetables daily.She consumes 0 sweetened beverage(s) daily.She exercises with enough effort to increase her heart rate 20 to 29 minutes per day.  She exercises with enough effort to increase her heart rate 4 days per week.   She is taking medications regularly.     Skin issues from the waist down.  Very itchy, at times its worse, little bumps everywhere.  Today is better. Triamcinolone 0.1% given in urgent care, felt like it made the rash worse.  Denies any new foods or detergent.    Rash  Onset/Duration: started this summer  Description  Location: around waistband and groin area  Character: flakey, red  Itching: when does itch, very itchy especially at night  Intensity:  mild to intense itching  Progression of Symptoms:  worsening  Accompanying signs and symptoms:   Fever: No  Body aches or joint pain: No  Sore throat symptoms: No  Recent cold symptoms: No  History:           Previous episodes of similar rash: None  New exposures:  None  Recent travel: No  Exposure to similar rash: No  Precipitating or alleviating factors:   Therapies tried and outcome: hydrocortisone cream -  not " "effective        Review of Systems  Constitutional, HEENT, cardiovascular, pulmonary, gi and gu systems are negative, except as otherwise noted.      Objective    /82   Pulse 74   Temp 99.4  F (37.4  C) (Tympanic)   Resp 16   Ht 1.626 m (5' 4\")   Wt 94.1 kg (207 lb 8 oz)   SpO2 99%   BMI 35.62 kg/m    Body mass index is 35.62 kg/m .  Physical Exam   GENERAL: alert and no distress  SKIN: erythematous plaques in bilateral groin area          Signed Electronically by: Milton Lebron DO  "

## 2024-10-15 NOTE — PATIENT INSTRUCTIONS
Recommendations for Dry Skin Care:   Keep baths and showers SHORT, ideally less than 10 minutes  Always use lukewarm water when possible. Avoid very HOT or COLD water  Do NOT use bubble bath  Limit use of soaps. Focus on  dirty  areas-face, armpits, groin, and feet  Do not vigorously scrub when you cleanse your skin  After bathing, PAT your skin lightly with a towel. Do not rub or scrub when drying  ALWAYS apply a moisturizer immediately after bathing. This helps  lock-in  moisture  Reapply moisturizing cream at least twice daily to you whole body. Your doctor may recommend a lighter or heavier moisturizer based on your child s severity and the time of year.   Do not use products such as powders, perfumes, or colognes on your skin  Avoid saunas and steam baths. The temperature is too HOT.  Use unscented hypo-allergenic laundry products. If your skin is still very dry, you can try DOUBLE-RINSING your clothes.  Avoid tight or  scratchy  clothing such as wool  Always wash new clothes before wearing for the first time  Sometimes a humidifier or vaporizer, used at night, can help with dry skin. Remember to keep it clean to avoid mold growth.    Below is a list of products our providers recommend for gentle skin care:    Use moisturizing creams at least twice daily to the whole body. Your provider may recommend a lighter or heavier moisturizer based on your child s severity and that time of year it is.    Lighter, more pleasing to the feel moisturizers include products such as:   - Cetaphil, CeraVe, Aveeno and Vanicream Lite  Thicker agents include:   - Vanicream, Aquaphor ointment, Vaseline, and Eucerin    **Creams are more moisturizing than lotions**    Products should be fragrance-free soaps, creams, detergents:   - Mild Bar Soaps include: Vanicream, CeraVeFragrance Free Dove, Basis, Purpose, CeraVe, and Vanicream   - Mild Liquid Cleansers include: Cetaphil, Aquanil, Cerave and Aquaphor   - Laundry Products include:  All Free and Clear, Dreft, and Cheer Free        Ceramides    There are several  moisturizers, body washes and cleansers on the market that have ceramides in them. Ceramides are a major part of the lipids (fats) that the skin needs to stay healthy.    The ones I recommend are:  - CeraVe (cream works better than lotion)  - Cetaphil Restoraderm   - Eucerin Professional Repair  - Curel Intensive Healing Cream    Cerave and Cetaphil have related body washes/cleansers available.  All are available over-the-counter, without a prescription.    If you have trouble locating them, [facility], [facility] and [facility] usually will stock them.

## 2024-10-18 NOTE — PROGRESS NOTES
Patient did not return for further treatment and no additional progress was noted.  Please refer to the progress note and goal flowsheet completed  for discharge information.

## 2024-10-22 ENCOUNTER — MYC MEDICAL ADVICE (OUTPATIENT)
Dept: FAMILY MEDICINE | Facility: CLINIC | Age: 69
End: 2024-10-22
Payer: COMMERCIAL

## 2024-10-22 DIAGNOSIS — E11.9 TYPE 2 DIABETES MELLITUS WITHOUT COMPLICATION, WITHOUT LONG-TERM CURRENT USE OF INSULIN (H): Primary | ICD-10-CM

## 2024-10-23 NOTE — TELEPHONE ENCOUNTER
A1c ordered.     She can set up a lab only visit non fasting anytime.       Healthy regards,            Georgina Caputo, FNP-BC

## 2024-10-23 NOTE — TELEPHONE ENCOUNTER
Attempt #1    LM to call and schedule a lab A1C when convenient - Order is in.    Closed encounter

## 2024-10-24 ENCOUNTER — HOSPITAL ENCOUNTER (OUTPATIENT)
Dept: MAMMOGRAPHY | Facility: CLINIC | Age: 69
Discharge: HOME | End: 2024-10-24
Attending: NURSE PRACTITIONER | Admitting: NURSE PRACTITIONER
Payer: COMMERCIAL

## 2024-10-24 ENCOUNTER — OFFICE VISIT (OUTPATIENT)
Dept: DERMATOLOGY | Facility: CLINIC | Age: 69
End: 2024-10-24
Attending: FAMILY MEDICINE
Payer: COMMERCIAL

## 2024-10-24 DIAGNOSIS — D23.9 DERMAL NEVUS: Primary | ICD-10-CM

## 2024-10-24 DIAGNOSIS — L30.4 INTERTRIGO: ICD-10-CM

## 2024-10-24 DIAGNOSIS — L82.1 SEBORRHEIC KERATOSES: ICD-10-CM

## 2024-10-24 DIAGNOSIS — D18.01 ANGIOMA OF SKIN: ICD-10-CM

## 2024-10-24 DIAGNOSIS — L81.4 LENTIGO: ICD-10-CM

## 2024-10-24 DIAGNOSIS — Z12.31 ENCOUNTER FOR SCREENING MAMMOGRAM FOR BREAST CANCER: ICD-10-CM

## 2024-10-24 PROCEDURE — 99203 OFFICE O/P NEW LOW 30 MIN: CPT | Performed by: DERMATOLOGY

## 2024-10-24 PROCEDURE — G2211 COMPLEX E/M VISIT ADD ON: HCPCS | Performed by: DERMATOLOGY

## 2024-10-24 PROCEDURE — 77063 BREAST TOMOSYNTHESIS BI: CPT

## 2024-10-24 RX ORDER — CICLOPIROX OLAMINE 7.7 MG/G
CREAM TOPICAL 2 TIMES DAILY
Qty: 60 G | Refills: 6 | Status: SHIPPED | OUTPATIENT
Start: 2024-10-24

## 2024-10-24 RX ORDER — NYSTATIN 100000 [USP'U]/G
POWDER TOPICAL
Qty: 120 G | Refills: 6 | Status: SHIPPED | OUTPATIENT
Start: 2024-10-24

## 2024-10-24 NOTE — LETTER
10/24/2024      Janelle Bhat  [address] [facility] [id number]      Dear Colleague,    Thank you for referring your patient, Janelle Bhat, to the [facility]. Please see a copy of my visit note below.    Janelle Bhat , a 68 year old year old female patient, I was asked to see by Dr. Lebron for a rash and spots on skin.  .  Patient has no other skin complaints today.  Remainder of the HPI, Meds, PMH, Allergies, FH, and SH was reviewed in chart.      Past Medical History:   Diagnosis Date     Diabetes (H)      Hypertension        Past Surgical History:   Procedure Laterality Date     BIOPSY BREAST Right 7/14/2022    Procedure: Right radiofrequency tag localized breast biopsy;  Surgeon: Reginaldo Miles MD;  Location: [facility] OR     COLONOSCOPY      2-3 times     GYN SURGERY  in [place]    ovaries removed Secondary to a Benign cyst. Has her Uterus.        Family History   Problem Relation Age of Onset     Diabetes Mother         type 2     Macular Degeneration Mother      Other Cancer Father         lung cancer +     Hypertension Sister      Skin Cancer Sister      Coronary Artery Disease Maternal Grandfather         brain aneurysm     Cerebrovascular Disease Maternal Grandmother      Other Cancer Paternal Grandmother         esophagus?       Social History     Socioeconomic History     Marital status: Single     Spouse name: Not on file     Number of children: Not on file     Years of education: Not on file     Highest education level: Not on file   Occupational History     Not on file   Tobacco Use     Smoking status: Never     Smokeless tobacco: Never   Vaping Use     Vaping status: Never Used   Substance and Sexual Activity     Alcohol use: Yes     Comment: less than 1 drink per week     Drug use: Never     Sexual activity: Not Currently     Partners: Male     Birth control/protection: None     Comment: not active   Other Topics Concern     Parent/sibling w/  CABG, MI or angioplasty before 65F 55M? No   Social History Narrative     Not on file     Social Drivers of Health     Financial Resource Strain: Low Risk  (4/8/2024)    Financial Resource Strain      Within the past 12 months, have you or your family members you live with been unable to get utilities (heat, electricity) when it was really needed?: No   Food Insecurity: Low Risk  (4/8/2024)    Food Insecurity      Within the past 12 months, did you worry that your food would run out before you got money to buy more?: No      Within the past 12 months, did the food you bought just not last and you didn t have money to get more?: No   Transportation Needs: Low Risk  (4/8/2024)    Transportation Needs      Within the past 12 months, has lack of transportation kept you from medical appointments, getting your medicines, non-medical meetings or appointments, work, or from getting things that you need?: No   Physical Activity: Insufficiently Active (4/8/2024)    Exercise Vital Sign      Days of Exercise per Week: 3 days      Minutes of Exercise per Session: 20 min   Stress: No Stress Concern Present (4/8/2024)    [employer] [place] [employer] - Occupational Stress Questionnaire      Feeling of Stress : Not at all   Social Connections: Unknown (4/8/2024)    Social Connection and Isolation Panel [NHANES]      Frequency of Communication with Friends and Family: Not on file      Frequency of Social Gatherings with Friends and Family: Three times a week      Attends Denominational Services: Not on file      Active Member of Clubs or Organizations: Not on file      Attends Club or Organization Meetings: Not on file      Marital Status: Not on file   Interpersonal Safety: Low Risk  (10/15/2024)    Interpersonal Safety      Do you feel physically and emotionally safe where you currently live?: Yes      Within the past 12 months, have you been hit, slapped, kicked or otherwise physically hurt by someone?: No      Within the  past 12 months, have you been humiliated or emotionally abused in other ways by your partner or ex-partner?: No   Housing Stability: Low Risk  (4/8/2024)    Housing Stability      Do you have housing? : Yes      Are you worried about losing your housing?: No       Outpatient Encounter Medications as of 10/24/2024   Medication Sig Dispense Refill     albuterol (PROAIR HFA/PROVENTIL HFA/VENTOLIN HFA) 108 (90 Base) MCG/ACT inhaler Inhale 2 puffs into the lungs every 6 hours as needed for shortness of breath, wheezing or cough 18 g 1     amLODIPine (NORVASC) 2.5 MG tablet Take 1 tablet (2.5 mg) by mouth every morning 90 tablet 4     amLODIPine (NORVASC) 2.5 MG tablet TAKE 1 TABLET (2.5 MG) BY MOUTH EVERY MORNING 90 tablet 0     atorvastatin (LIPITOR) 40 MG tablet Take 1 tablet (40 mg) by mouth daily 90 tablet 4     Calcium Carb-Cholecalciferol (CALCIUM 500+D3 PO) Take 1 capful by mouth daily       clotrimazole (LOTRIMIN) 1 % external cream Apply topically 2 times daily. 60 g 1     lisinopril-hydrochlorothiazide (ZESTORETIC) 20-12.5 MG tablet Take 2 tablets by mouth daily 180 tablet 4     metFORMIN (GLUCOPHAGE XR) 500 MG 24 hr tablet Take 4 tablets (2,000 mg) by mouth daily (with dinner) 4 tablets daily with dinner 360 tablet 4     ONETOUCH ULTRA test strip USE TO TEST BLOOD SUGAR 1-3 TIMES WEEKLY OR AS DIRECTED. 100 strip 1     thin (NO BRAND SPECIFIED) lancets Use to test blood sugar 2 times daily or as directed. To accompany: Blood Glucose Monitor Brands: per insurance.Currently one touch delica 33G lancets please 100 each 3     vitamin C (ASCORBIC ACID) 1000 MG TABS Take 1,000 mg by mouth daily       vitamin D3 (CHOLECALCIFEROL) 50 mcg (2000 units) tablet Take 1 tablet by mouth daily       No facility-administered encounter medications on file as of 10/24/2024.             Review Of Systems  Skin: As above  Eyes: negative  Ears/Nose/Throat: negative  Respiratory: No shortness of breath, dyspnea on exertion, cough, or  hemoptysis  Cardiovascular: negative  Gastrointestinal: negative  Genitourinary: negative  Musculoskeletal: negative  Neurologic: negative  Psychiatric: negative  Hematologic/Lymphatic/Immunologic: negative  Endocrine: negative      O:   NAD, WDWN, Alert & Oriented, Mood & Affect wnl, Vitals stable   General appearance albaro ii   Vitals stable   Alert, oriented and in no acute distress   Macerated plaques in groin    Stuck on papules and brown macules on trunk and ext    Red papules on trunk   Flesh colored papules on trunk      The remainder of the full exam was normal; the following areas were examined:  conjunctiva/lids, , neck, peripheral vascular system, abdomen, lymph nodes, digits/nails, eccrine and apocrine glands, scalp/hair, face, neck, chest, abdomen, buttocks, back, RUE, LUE, RLE, LLE       Eyes: Conjunctivae/lids:Normal     ENT: Lips, mucosa: normal    MSK:Normal    Cardiovascular: peripheral edema none    Pulm: Breathing Normal    Lymph Nodes: No Head and Neck Lymphadenopathy     Neuro/Psych: Orientation:Normal; Mood/Affect:Normal      A/P:  1. Seborrheic keratosis, lentigo, angioma, dermal nevus  2. Intertrigo  Pathophysiology discussed with pateint   Loprox daily  Desitin bedtime  Nystatin daily   It was a pleasure speaking to Janelle Bhat today.  Previous clinic  notes and pertinent laboratory tests were reviewed prior to Janelle Bhat's visit.  Nature and genetics of benign skin lesions dicussed with patient.  Signs and Symptoms of skin cancer discussed with patient.  Patient encouraged to perform monthly skin exams.  UV precautions reviewed with patient.  Return to clinic 12 months      Again, thank you for allowing me to participate in the care of your patient.        Sincerely,        Bryson Moser MD

## 2024-10-24 NOTE — PROGRESS NOTES
Janelle Bhat , a 68 year old year old female patient, I was asked to see by Dr. Lebron for a rash and spots on skin.  .  Patient has no other skin complaints today.  Remainder of the HPI, Meds, PMH, Allergies, FH, and SH was reviewed in chart.      Past Medical History:   Diagnosis Date    Diabetes (H)     Hypertension        Past Surgical History:   Procedure Laterality Date    BIOPSY BREAST Right 7/14/2022    Procedure: Right radiofrequency tag localized breast biopsy;  Surgeon: Reginaldo Miles MD;  Location: [facility] OR    COLONOSCOPY      2-3 times    GYN SURGERY  in [place]    ovaries removed Secondary to a Benign cyst. Has her Uterus.        Family History   Problem Relation Age of Onset    Diabetes Mother         type 2    Macular Degeneration Mother     Other Cancer Father         lung cancer +    Hypertension Sister     Skin Cancer Sister     Coronary Artery Disease Maternal Grandfather         brain aneurysm    Cerebrovascular Disease Maternal Grandmother     Other Cancer Paternal Grandmother         esophagus?       Social History     Socioeconomic History    Marital status: Single     Spouse name: Not on file    Number of children: Not on file    Years of education: Not on file    Highest education level: Not on file   Occupational History    Not on file   Tobacco Use    Smoking status: Never    Smokeless tobacco: Never   Vaping Use    Vaping status: Never Used   Substance and Sexual Activity    Alcohol use: Yes     Comment: less than 1 drink per week    Drug use: Never    Sexual activity: Not Currently     Partners: Male     Birth control/protection: None     Comment: not active   Other Topics Concern    Parent/sibling w/ CABG, MI or angioplasty before 65F 55M? No   Social History Narrative    Not on file     Social Drivers of Health     Financial Resource Strain: Low Risk  (4/8/2024)    Financial Resource Strain     Within the past 12 months, have you or your family members you live with been  unable to get utilities (heat, electricity) when it was really needed?: No   Food Insecurity: Low Risk  (4/8/2024)    Food Insecurity     Within the past 12 months, did you worry that your food would run out before you got money to buy more?: No     Within the past 12 months, did the food you bought just not last and you didn t have money to get more?: No   Transportation Needs: Low Risk  (4/8/2024)    Transportation Needs     Within the past 12 months, has lack of transportation kept you from medical appointments, getting your medicines, non-medical meetings or appointments, work, or from getting things that you need?: No   Physical Activity: Insufficiently Active (4/8/2024)    Exercise Vital Sign     Days of Exercise per Week: 3 days     Minutes of Exercise per Session: 20 min   Stress: No Stress Concern Present (4/8/2024)    [employer] [employer] - Occupational Stress Questionnaire     Feeling of Stress : Not at all   Social Connections: Unknown (4/8/2024)    Social Connection and Isolation Panel [NHANES]     Frequency of Communication with Friends and Family: Not on file     Frequency of Social Gatherings with Friends and Family: Three times a week     Attends Denominational Services: Not on file     Active Member of Clubs or Organizations: Not on file     Attends Club or Organization Meetings: Not on file     Marital Status: Not on file   Interpersonal Safety: Low Risk  (10/15/2024)    Interpersonal Safety     Do you feel physically and emotionally safe where you currently live?: Yes     Within the past 12 months, have you been hit, slapped, kicked or otherwise physically hurt by someone?: No     Within the past 12 months, have you been humiliated or emotionally abused in other ways by your partner or ex-partner?: No   Housing Stability: Low Risk  (4/8/2024)    Housing Stability     Do you have housing? : Yes     Are you worried about losing your housing?: No       Outpatient Encounter Medications  as of 10/24/2024   Medication Sig Dispense Refill    albuterol (PROAIR HFA/PROVENTIL HFA/VENTOLIN HFA) 108 (90 Base) MCG/ACT inhaler Inhale 2 puffs into the lungs every 6 hours as needed for shortness of breath, wheezing or cough 18 g 1    amLODIPine (NORVASC) 2.5 MG tablet Take 1 tablet (2.5 mg) by mouth every morning 90 tablet 4    amLODIPine (NORVASC) 2.5 MG tablet TAKE 1 TABLET (2.5 MG) BY MOUTH EVERY MORNING 90 tablet 0    atorvastatin (LIPITOR) 40 MG tablet Take 1 tablet (40 mg) by mouth daily 90 tablet 4    Calcium Carb-Cholecalciferol (CALCIUM 500+D3 PO) Take 1 capful by mouth daily      clotrimazole (LOTRIMIN) 1 % external cream Apply topically 2 times daily. 60 g 1    lisinopril-hydrochlorothiazide (ZESTORETIC) 20-12.5 MG tablet Take 2 tablets by mouth daily 180 tablet 4    metFORMIN (GLUCOPHAGE XR) 500 MG 24 hr tablet Take 4 tablets (2,000 mg) by mouth daily (with dinner) 4 tablets daily with dinner 360 tablet 4    ONETOUCH ULTRA test strip USE TO TEST BLOOD SUGAR 1-3 TIMES WEEKLY OR AS DIRECTED. 100 strip 1    thin (NO BRAND SPECIFIED) lancets Use to test blood sugar 2 times daily or as directed. To accompany: Blood Glucose Monitor Brands: per insurance.Currently one touch delica 33G lancets please 100 each 3    vitamin C (ASCORBIC ACID) 1000 MG TABS Take 1,000 mg by mouth daily      vitamin D3 (CHOLECALCIFEROL) 50 mcg (2000 units) tablet Take 1 tablet by mouth daily       No facility-administered encounter medications on file as of 10/24/2024.             Review Of Systems  Skin: As above  Eyes: negative  Ears/Nose/Throat: negative  Respiratory: No shortness of breath, dyspnea on exertion, cough, or hemoptysis  Cardiovascular: negative  Gastrointestinal: negative  Genitourinary: negative  Musculoskeletal: negative  Neurologic: negative  Psychiatric: negative  Hematologic/Lymphatic/Immunologic: negative  Endocrine: negative      O:   NAD, WDWN, Alert & Oriented, Mood & Affect wnl, Vitals stable   General  appearance albaro ii   Vitals stable   Alert, oriented and in no acute distress   Macerated plaques in groin    Stuck on papules and brown macules on trunk and ext    Red papules on trunk   Flesh colored papules on trunk      The remainder of the full exam was normal; the following areas were examined:  conjunctiva/lids, , neck, peripheral vascular system, abdomen, lymph nodes, digits/nails, eccrine and apocrine glands, scalp/hair, face, neck, chest, abdomen, buttocks, back, RUE, LUE, RLE, LLE       Eyes: Conjunctivae/lids:Normal     ENT: Lips, mucosa: normal    MSK:Normal    Cardiovascular: peripheral edema none    Pulm: Breathing Normal    Lymph Nodes: No Head and Neck Lymphadenopathy     Neuro/Psych: Orientation:Normal; Mood/Affect:Normal      A/P:  1. Seborrheic keratosis, lentigo, angioma, dermal nevus  2. Intertrigo  Pathophysiology discussed with pateint   Loprox daily  Desitin bedtime  Nystatin daily   It was a pleasure speaking to Janelle Bhat today.  Previous clinic  notes and pertinent laboratory tests were reviewed prior to Janelle Bhat's visit.  Nature and genetics of benign skin lesions dicussed with patient.  Signs and Symptoms of skin cancer discussed with patient.  Patient encouraged to perform monthly skin exams.  UV precautions reviewed with patient.  Return to clinic 12 months     [Normal] : affect was normal and insight and judgment were intact [Alert and Oriented x3] : oriented to person, place, and time [de-identified] : Done by GYN

## 2024-10-29 ENCOUNTER — LAB (OUTPATIENT)
Dept: LAB | Facility: CLINIC | Age: 69
End: 2024-10-29
Payer: COMMERCIAL

## 2024-10-29 DIAGNOSIS — E11.9 TYPE 2 DIABETES MELLITUS WITHOUT COMPLICATION, WITHOUT LONG-TERM CURRENT USE OF INSULIN (H): ICD-10-CM

## 2024-10-29 LAB
EST. AVERAGE GLUCOSE BLD GHB EST-MCNC: 137 MG/DL
HBA1C MFR BLD: 6.4 % (ref 0–5.6)

## 2024-10-29 PROCEDURE — 36415 COLL VENOUS BLD VENIPUNCTURE: CPT

## 2024-10-29 PROCEDURE — 83036 HEMOGLOBIN GLYCOSYLATED A1C: CPT

## 2024-11-01 NOTE — RESULT ENCOUNTER NOTE
Dear Janelle Dickinson,    Here is a summary of your recent test results:    -A1C (test of diabetes control the last 2-3 months) is at your goal. We will  recheck your A1C test in 6 months with your physical.     For additional lab test information, labtestsonline.org is an excellent reference.    In addition, here is a list of due or overdue Health Maintenance reminders:    RSV VACCINE(1 - Risk 60-74 years 1-dose series) Never done    Please call us at [phone] (or use [employer]) to address the above recommendations if needed.    Thank you for choosing  [facility] [place].  It was an honor and a privilege to participate in your care.       Healthy regards,    Georgina Caputo, ROSS  [facility]

## 2025-03-12 ENCOUNTER — VIRTUAL VISIT (OUTPATIENT)
Dept: PLASTIC SURGERY | Facility: CLINIC | Age: 70
End: 2025-03-12
Payer: COMMERCIAL

## 2025-03-12 DIAGNOSIS — N62 MACROMASTIA: Primary | ICD-10-CM

## 2025-03-12 ASSESSMENT — PAIN SCALES - GENERAL: PAINLEVEL_OUTOF10: NO PAIN (0)

## 2025-03-12 NOTE — PROGRESS NOTES
PRS    Telephone start: 1:15 PM  Telephone end: 1:25 PM  Patient is taking phone call from [place]    Patient has been undergoing PT for the last 4-5 months with no significant relief of symptoms associated with macromastia, including upper back and neck pain.  She would like to proceed with surgery.  BSA is 2.0.  Schnur calculation is 628 g per side.    Discussed options for management.  Since the patient has not had relief with attempted conservative treatment, I would not be unreasonable to proceed with bilateral breast reduction.  Patient would like this.  We discussed the outpatient nature of the surgery, typical recovery, activity restrictions and expected outcome.  Patient would like to proceed with surgery.    Discussed the risks of surgery, including but not limited to: Infection, bleeding, pain, poor scarring, wound healing issues, need for revision or additional surgery, suboptimal aesthetic result, loss of nipple areola, loss of nipple areolar sensation, asymmetry, fat necrosis, anesthesia related complication, DVT, PE, seroma, hematoma, death.  Despite these risks, patient consents to and would like to proceed with bilateral breast reduction.  All questions answered.  Case request placed.    A total of 20 minutes was devoted to review of chart, direct patient counseling and documentation during and on the day of this encounter, exclusive of any procedure performed.    Carlos Chávez MD, PhD, FACS

## 2025-03-12 NOTE — LETTER
3/12/2025       RE: Janelle Bhat  [address] [facility] [address]     Dear Colleague,    Thank you for referring your patient, Janelle Bhat, to the [facility]ERY [facility] [place] at [facility]. Please see a copy of my visit note below.    PRS    Telephone start: 1:15 PM  Telephone end: 1:25 PM  Patient is taking phone call from [place]    Patient has been undergoing PT for the last 4-5 months with no significant relief of symptoms associated with macromastia, including upper back and neck pain.  She would like to proceed with surgery.  BSA is 2.0.  Schnur calculation is 628 g per side.    Discussed options for management.  Since the patient has not had relief with attempted conservative treatment, I would not be unreasonable to proceed with bilateral breast reduction.  Patient would like this.  We discussed the outpatient nature of the surgery, typical recovery, activity restrictions and expected outcome.  Patient would like to proceed with surgery.    Discussed the risks of surgery, including but not limited to: Infection, bleeding, pain, poor scarring, wound healing issues, need for revision or additional surgery, suboptimal aesthetic result, loss of nipple areola, loss of nipple areolar sensation, asymmetry, fat necrosis, anesthesia related complication, DVT, PE, seroma, hematoma, death.  Despite these risks, patient consents to and would like to proceed with bilateral breast reduction.  All questions answered.  Case request placed.    A total of 20 minutes was devoted to review of chart, direct patient counseling and documentation during and on the day of this encounter, exclusive of any procedure performed.    Carlos Chávez MD, PhD, FACS        Again, thank you for allowing me to participate in the care of your patient.      Sincerely,    Carlos Chávez MD

## 2025-04-05 ENCOUNTER — HEALTH MAINTENANCE LETTER (OUTPATIENT)
Age: 70
End: 2025-04-05

## 2025-04-10 SDOH — HEALTH STABILITY: PHYSICAL HEALTH: ON AVERAGE, HOW MANY MINUTES DO YOU ENGAGE IN EXERCISE AT THIS LEVEL?: 30 MIN

## 2025-04-10 SDOH — HEALTH STABILITY: PHYSICAL HEALTH: ON AVERAGE, HOW MANY DAYS PER WEEK DO YOU ENGAGE IN MODERATE TO STRENUOUS EXERCISE (LIKE A BRISK WALK)?: 2 DAYS

## 2025-04-10 ASSESSMENT — SOCIAL DETERMINANTS OF HEALTH (SDOH): HOW OFTEN DO YOU GET TOGETHER WITH FRIENDS OR RELATIVES?: ONCE A WEEK

## 2025-04-14 ENCOUNTER — OFFICE VISIT (OUTPATIENT)
Dept: FAMILY MEDICINE | Facility: CLINIC | Age: 70
End: 2025-04-14
Payer: COMMERCIAL

## 2025-04-14 ENCOUNTER — TELEPHONE (OUTPATIENT)
Dept: PLASTIC SURGERY | Facility: CLINIC | Age: 70
End: 2025-04-14

## 2025-04-14 VITALS
HEART RATE: 94 BPM | DIASTOLIC BLOOD PRESSURE: 90 MMHG | HEIGHT: 64 IN | BODY MASS INDEX: 34.15 KG/M2 | WEIGHT: 200 LBS | SYSTOLIC BLOOD PRESSURE: 165 MMHG | TEMPERATURE: 97.8 F | RESPIRATION RATE: 12 BRPM | OXYGEN SATURATION: 100 %

## 2025-04-14 DIAGNOSIS — D23.9 DERMAL NEVUS: ICD-10-CM

## 2025-04-14 DIAGNOSIS — I10 HYPERTENSION GOAL BP (BLOOD PRESSURE) < 130/80: ICD-10-CM

## 2025-04-14 DIAGNOSIS — E55.9 VITAMIN D DEFICIENCY: ICD-10-CM

## 2025-04-14 DIAGNOSIS — Z12.31 ENCOUNTER FOR SCREENING MAMMOGRAM FOR BREAST CANCER: ICD-10-CM

## 2025-04-14 DIAGNOSIS — E11.628 ACANTHOSIS NIGRICANS DUE TO TYPE 2 DIABETES MELLITUS (H): ICD-10-CM

## 2025-04-14 DIAGNOSIS — D18.01 ANGIOMA OF SKIN: ICD-10-CM

## 2025-04-14 DIAGNOSIS — E66.811 CLASS 1 OBESITY DUE TO EXCESS CALORIES WITH SERIOUS COMORBIDITY AND BODY MASS INDEX (BMI) OF 34.0 TO 34.9 IN ADULT: ICD-10-CM

## 2025-04-14 DIAGNOSIS — E66.09 CLASS 1 OBESITY DUE TO EXCESS CALORIES WITH SERIOUS COMORBIDITY AND BODY MASS INDEX (BMI) OF 34.0 TO 34.9 IN ADULT: ICD-10-CM

## 2025-04-14 DIAGNOSIS — Z13.6 SCREENING FOR CARDIOVASCULAR CONDITION: ICD-10-CM

## 2025-04-14 DIAGNOSIS — L30.4 INTERTRIGO: ICD-10-CM

## 2025-04-14 DIAGNOSIS — L83 ACANTHOSIS NIGRICANS DUE TO TYPE 2 DIABETES MELLITUS (H): ICD-10-CM

## 2025-04-14 DIAGNOSIS — E78.5 HYPERLIPIDEMIA LDL GOAL <100: ICD-10-CM

## 2025-04-14 DIAGNOSIS — N62 MACROMASTIA: ICD-10-CM

## 2025-04-14 DIAGNOSIS — Z00.00 ENCOUNTER FOR MEDICARE ANNUAL WELLNESS EXAM: ICD-10-CM

## 2025-04-14 DIAGNOSIS — L82.1 SEBORRHEIC KERATOSES: ICD-10-CM

## 2025-04-14 DIAGNOSIS — E11.9 TYPE 2 DIABETES MELLITUS WITHOUT COMPLICATION, WITHOUT LONG-TERM CURRENT USE OF INSULIN (H): Primary | ICD-10-CM

## 2025-04-14 DIAGNOSIS — Z13.29 SCREENING FOR THYROID DISORDER: ICD-10-CM

## 2025-04-14 DIAGNOSIS — Z13.0 SCREENING FOR DEFICIENCY ANEMIA: ICD-10-CM

## 2025-04-14 DIAGNOSIS — R92.30 DENSE BREASTS: ICD-10-CM

## 2025-04-14 DIAGNOSIS — L81.4 LENTIGO: ICD-10-CM

## 2025-04-14 DIAGNOSIS — E53.8 VITAMIN B12 DEFICIENCY (NON ANEMIC): ICD-10-CM

## 2025-04-14 PROBLEM — E66.812 CLASS 2 SEVERE OBESITY DUE TO EXCESS CALORIES WITH SERIOUS COMORBIDITY IN ADULT (H): Status: RESOLVED | Noted: 2024-02-21 | Resolved: 2025-04-14

## 2025-04-14 PROBLEM — E66.01 CLASS 2 SEVERE OBESITY DUE TO EXCESS CALORIES WITH SERIOUS COMORBIDITY IN ADULT (H): Status: RESOLVED | Noted: 2024-02-21 | Resolved: 2025-04-14

## 2025-04-14 LAB
ERYTHROCYTE [DISTWIDTH] IN BLOOD BY AUTOMATED COUNT: 12.9 % (ref 10–15)
HCT VFR BLD AUTO: 37.3 % (ref 35–47)
HGB BLD-MCNC: 12.6 G/DL (ref 11.7–15.7)
MCH RBC QN AUTO: 29.7 PG (ref 26.5–33)
MCHC RBC AUTO-ENTMCNC: 33.8 G/DL (ref 31.5–36.5)
MCV RBC AUTO: 88 FL (ref 78–100)
PLATELET # BLD AUTO: 298 10E3/UL (ref 150–450)
RBC # BLD AUTO: 4.24 10E6/UL (ref 3.8–5.2)
WBC # BLD AUTO: 7.8 10E3/UL (ref 4–11)

## 2025-04-14 PROCEDURE — 82306 VITAMIN D 25 HYDROXY: CPT | Performed by: NURSE PRACTITIONER

## 2025-04-14 PROCEDURE — 80053 COMPREHEN METABOLIC PANEL: CPT | Performed by: NURSE PRACTITIONER

## 2025-04-14 PROCEDURE — 84443 ASSAY THYROID STIM HORMONE: CPT | Performed by: NURSE PRACTITIONER

## 2025-04-14 PROCEDURE — 36415 COLL VENOUS BLD VENIPUNCTURE: CPT | Performed by: NURSE PRACTITIONER

## 2025-04-14 PROCEDURE — 85027 COMPLETE CBC AUTOMATED: CPT | Performed by: NURSE PRACTITIONER

## 2025-04-14 PROCEDURE — 80061 LIPID PANEL: CPT | Performed by: NURSE PRACTITIONER

## 2025-04-14 PROCEDURE — 83036 HEMOGLOBIN GLYCOSYLATED A1C: CPT | Performed by: NURSE PRACTITIONER

## 2025-04-14 PROCEDURE — 82570 ASSAY OF URINE CREATININE: CPT | Performed by: NURSE PRACTITIONER

## 2025-04-14 PROCEDURE — 82043 UR ALBUMIN QUANTITATIVE: CPT | Performed by: NURSE PRACTITIONER

## 2025-04-14 PROCEDURE — 82607 VITAMIN B-12: CPT | Performed by: NURSE PRACTITIONER

## 2025-04-14 RX ORDER — ATORVASTATIN CALCIUM 40 MG/1
40 TABLET, FILM COATED ORAL DAILY
Qty: 90 TABLET | Refills: 4 | Status: SHIPPED | OUTPATIENT
Start: 2025-04-14

## 2025-04-14 RX ORDER — LISINOPRIL AND HYDROCHLOROTHIAZIDE 12.5; 2 MG/1; MG/1
2 TABLET ORAL DAILY
Qty: 180 TABLET | Refills: 4 | Status: SHIPPED | OUTPATIENT
Start: 2025-04-14

## 2025-04-14 RX ORDER — CICLOPIROX OLAMINE 7.7 MG/G
CREAM TOPICAL 2 TIMES DAILY
Qty: 60 G | Refills: 6 | Status: SHIPPED | OUTPATIENT
Start: 2025-04-14

## 2025-04-14 RX ORDER — AMLODIPINE BESYLATE 5 MG/1
5 TABLET ORAL DAILY
Qty: 90 TABLET | Refills: 4 | Status: SHIPPED | OUTPATIENT
Start: 2025-04-14

## 2025-04-14 RX ORDER — AMLODIPINE BESYLATE 2.5 MG/1
2.5 TABLET ORAL EVERY MORNING
Qty: 90 TABLET | Refills: 4 | Status: CANCELLED | OUTPATIENT
Start: 2025-04-14

## 2025-04-14 RX ORDER — METFORMIN HYDROCHLORIDE 500 MG/1
2000 TABLET, EXTENDED RELEASE ORAL
Qty: 360 TABLET | Refills: 4 | Status: SHIPPED | OUTPATIENT
Start: 2025-04-14

## 2025-04-14 NOTE — PATIENT INSTRUCTIONS
Acanthosis nigricans is a common condition characterized by velvety, hyperpigmented plaques on the skin. Intertriginous sites, such as the neck and axillae, are common sites for involvement. Less frequently, acanthosis nigricans appears in other skin sites or on mucosal surfaces.   Patient Education   Preventive Care Advice   This is general advice given by our system to help you stay healthy. However, your care team may have specific advice just for you. Please talk to your care team about your preventive care needs.  Nutrition  Eat 5 or more servings of fruits and vegetables each day.  Try wheat bread, brown rice and whole grain pasta (instead of white bread, rice, and pasta).  Get enough calcium and vitamin D. Check the label on foods and aim for 100% of the RDA (recommended daily allowance).  Lifestyle  Exercise at least 150 minutes each week  (30 minutes a day, 5 days a week).  Do muscle strengthening activities 2 days a week. These help control your weight and prevent disease.  No smoking.  Wear sunscreen to prevent skin cancer.  Have a dental exam and cleaning every 6 months.  Yearly exams  See your health care team every year to talk about:  Any changes in your health.  Any medicines your care team has prescribed.  Preventive care, family planning, and ways to prevent chronic diseases.  Shots (vaccines)   HPV shots (up to age 26), if you've never had them before.  Hepatitis B shots (up to age 59), if you've never had them before.  COVID-19 shot: Get this shot when it's due.  Flu shot: Get a flu shot every year.  Tetanus shot: Get a tetanus shot every 10 years.  Pneumococcal, hepatitis A, and RSV shots: Ask your care team if you need these based on your risk.  Shingles shot (for age 50 and up)  General health tests  Diabetes screening:  Starting at age 35, Get screened for diabetes at least every 3 years.  If you are younger than age 35, ask your care team if you should be screened for diabetes.  Cholesterol  test: At age 39, start having a cholesterol test every 5 years, or more often if advised.  Bone density scan (DEXA): At age 50, ask your care team if you should have this scan for osteoporosis (brittle bones).  Hepatitis C: Get tested at least once in your life.  STIs (sexually transmitted infections)  Before age 24: Ask your care team if you should be screened for STIs.  After age 24: Get screened for STIs if you're at risk. You are at risk for STIs (including HIV) if:  You are sexually active with more than one person.  You don't use condoms every time.  You or a partner was diagnosed with a sexually transmitted infection.  If you are at risk for HIV, ask about PrEP medicine to prevent HIV.  Get tested for HIV at least once in your life, whether you are at risk for HIV or not.  Cancer screening tests  Cervical cancer screening: If you have a cervix, begin getting regular cervical cancer screening tests starting at age 21.  Breast cancer scan (mammogram): If you've ever had breasts, begin having regular mammograms starting at age 40. This is a scan to check for breast cancer.  Colon cancer screening: It is important to start screening for colon cancer at age 45.  Have a colonoscopy test every 10 years (or more often if you're at risk) Or, ask your provider about stool tests like a FIT test every year or Cologuard test every 3 years.  To learn more about your testing options, visit:   .  For help making a decision, visit:   https://bit.ly/xl54689.  Prostate cancer screening test: If you have a prostate, ask your care team if a prostate cancer screening test (PSA) at age 55 is right for you.  Lung cancer screening: If you are a current or former smoker ages 50 to 80, ask your care team if ongoing lung cancer screenings are right for you.  For informational purposes only. Not to replace the advice of your health care provider. Copyright   2023 [employer]. All rights reserved. Clinically reviewed by the [facility] Transitions Program. [employer] / [email or web address] [id number] - REV 01/24.  Substance Use Disorder: Care Instructions  Overview     You can improve your life and health by stopping your use of alcohol or drugs. When you don't drink or use drugs, you may feel and sleep better. You may get along better with your family, friends, and coworkers. There are medicines and programs that can help with substance use disorder.  How can you care for yourself at home?  Here are some ways to help you stay sober and prevent relapse.  If you have been given medicine to help keep you sober or reduce your cravings, be sure to take it exactly as prescribed.  Talk to your doctor about programs that can help you stop using drugs or drinking alcohol.  Do not keep alcohol or drugs in your home.  Plan ahead. Think about what you'll say if other people ask you to drink or use drugs. Try not to spend time with people who drink or use drugs.  Use the time and money spent on drinking or drugs to do something that's important to you.  Preventing a relapse  Have a plan to deal with relapse. Learn to recognize changes in your thinking that lead you to drink or use drugs. Get help before you start to drink or use drugs again.  Try to stay away from situations, friends, or places that may lead you to drink or use drugs.  If you feel the need to drink alcohol or use drugs again, seek help right away. Call a trusted friend or family member. Some people get support from organizations such as Narcotics Anonymous or [place] or from treatment facilities.  If you relapse, get help as soon as you can. Some people make a plan with another person that outlines what they want that person to do for them if they relapse. The plan usually includes how to handle the relapse and who to notify in case of relapse.  Don't give up. Remember that a relapse doesn't mean that you have failed. Use the experience to learn the triggers that lead you to drink or use drugs.  "Then quit again. Recovery is a lifelong process. Many people have several relapses before they are able to quit for good.  Follow-up care is a key part of your treatment and safety. Be sure to make and go to all appointments, and call your doctor if you are having problems. It's also a good idea to know your test results and keep a list of the medicines you take.  When should you call for help?   Call 911  anytime you think you may need emergency care. For example, call if you or someone else:    Has overdosed or has withdrawal signs. Be sure to tell the emergency workers that you are or someone else is using or trying to quit using drugs. Overdose or withdrawal signs may include:  Losing consciousness.  Seizure.  Seeing or hearing things that aren't there (hallucinations).     Is thinking or talking about suicide or harming others.   Where to get help 24 hours a day, 7 days a week   If you or someone you know talks about suicide, self-harm, a mental health crisis, a substance use crisis, or any other kind of emotional distress, get help right away. You can:    Call the Suicide and Crisis Lifeline at 988.     Call 9-897-982-CUCH (1-302.277.7900).     Text HOME to 254144 to access the Crisis Text Line.   Consider saving these numbers in your phone.  Go to [employer]org for more information or to chat online.  Call your doctor now or seek immediate medical care if:    You are having withdrawal symptoms. These may include nausea or vomiting, sweating, shakiness, and anxiety.   Watch closely for changes in your health, and be sure to contact your doctor if:    You have a relapse.     You need more help or support to stop.   Where can you learn more?  Go to https://www.[employer].com.net/patiented  Enter H573 in the search box to learn more about \"Substance Use Disorder: Care Instructions.\"  Current as of: August 20, 2024  Content Version: 14.4    5169-2534 [employer].   Care instructions adapted under license " by your healthcare professional. If you have questions about a medical condition or this instruction, always ask your healthcare professional. [employer] disclaims any warranty or liability for your use of this information.

## 2025-04-14 NOTE — PROGRESS NOTES
Preventive Care Visit  [facility]  Georgina Caputo, APRN CNP, Nurse Practitioner - Family  Apr 14, 2025  {Provider  Link to SmartSet :431614}    {PROVIDER CHARTING PREFERENCE:971333}    Subjective   Janelle Dickinson is a 69 year old, presenting for the following:  Physical and Recheck Medication        4/14/2025    11:19 AM   Additional Questions   Roomed by Csaba CMA   Accompanied by Self     {ROOMER if patient is in their first year of Medicare a vision screen is required click here to document the [place] screen and then refresh the note to pull in results  :464517}      HPI  ***   {MA/LPN/RN Pre-Provider Visit Orders- hCG/UA/Strep (Optional):171531}  Diabetes Follow-up    How often are you checking your blood sugar? A few times a week   What time of day are you checking your blood sugars (select all that apply)?  Before and after meals  Have you had any blood sugars above 200?  No  Have you had any blood sugars below 70?  No  What symptoms do you notice when your blood sugar is low?  None  What concerns do you have today about your diabetes? None   Do you have any of these symptoms? (Select all that apply)  No numbness or tingling in feet.  No redness, sores or blisters on feet.  No complaints of excessive thirst.  No reports of blurry vision.  No significant changes to weight.  Have you had a diabetic eye exam in the last 12 months? Yes- Date of last eye exam: 04/2024,  Location: [facility] in [place] -- has appt in 2 days      Wt Readings from Last 5 Encounters:   04/14/25 90.7 kg (200 lb)   10/15/24 94.1 kg (207 lb 8 oz)   06/26/24 94.3 kg (208 lb)   06/09/24 95.2 kg (209 lb 14.4 oz)   04/09/24 94.3 kg (208 lb)          BP Readings from Last 2 Encounters:   04/14/25 (!) 165/90   10/15/24 132/82     Hemoglobin A1C (%)   Date Value   10/29/2024 6.4 (H)   04/09/2024 6.5 (H)   12/21/2020 6.3 (H)   06/18/2020 6.4 (H)     LDL Cholesterol Calculated (mg/dL)   Date Value   04/09/2024 45   03/24/2023 57    12/21/2020 46   10/25/2019 56       {Reference  Diabetes Management Resources  Blood Glucose Log - 3 weeks  Blood Glucose Log with Food and Insulin Record :703571}      Hyperlipidemia Follow-Up    Are you regularly taking any medication or supplement to lower your cholesterol?   Yes- Atorvastatin  Are you having muscle aches or other side effects that you think could be caused by your cholesterol lowering medication?  No    Hypertension Follow-up    Do you check your blood pressure regularly outside of the clinic? Machine is not working -- borrowed last one   Are you following a low salt diet? Yes -- tests have come back low sodium last 2 years  Are your blood pressures ever more than 140 on the top number (systolic) OR more   than 90 on the bottom number (diastolic), for example 140/90? N/A    BP Readings from Last 2 Encounters:   04/14/25 (!) 165/90   10/15/24 132/82     {additonal problems for provider to add (Optional):664982}  Advance Care Planning  Patient has a Health Care Directive on file  {(AWV REQUIRED) Advance Care Planning Reviewed:994616}      4/10/2025   General Health   How would you rate your overall physical health? Good   Feel stress (tense, anxious, or unable to sleep) Not at all         4/10/2025   Nutrition   Diet: Low fat/cholesterol    Diabetic       Multiple values from one day are sorted in reverse-chronological order         4/10/2025   Exercise   Days per week of moderate/strenous exercise 2 days   Average minutes spent exercising at this level 30 min   (!) EXERCISE CONCERN      4/10/2025   Social Factors   Frequency of gathering with friends or relatives Once a week   Worry food won't last until get money to buy more No   Food not last or not have enough money for food? No   Do you have housing? (Housing is defined as stable permanent housing and does not include staying ouside in a car, in a tent, in an abandoned building, in an overnight shelter, or couch-surfing.) Yes   Are you  worried about losing your housing? No   Lack of transportation? No   Unable to get utilities (heat,electricity)? No         4/10/2025   Fall Risk   Fallen 2 or more times in the past year? No   Trouble with walking or balance? No          4/10/2025   Activities of Daily Living- Home Safety   Needs help with the following daily activites None of the above   Safety concerns in the home None of the above         4/10/2025   Dental   Dentist two times every year? Yes         4/10/2025   Hearing Screening   Hearing concerns? None of the above         4/10/2025   Driving Risk Screening   Patient/family members have concerns about driving No         4/10/2025   General Alertness/Fatigue Screening   Have you been more tired than usual lately? No         4/10/2025   Urinary Incontinence Screening   Bothered by leaking urine in past 6 months No           4/8/2024   TB Screening   Were you born outside of the [place]? No           Today's PHQ-2 Score:       4/14/2025    11:02 AM   PHQ-2 ( 1999 Pfizer)   Q1: Little interest or pleasure in doing things 0   Q2: Feeling down, depressed or hopeless 0   PHQ-2 Score 0    Q1: Little interest or pleasure in doing things Not at all   Q2: Feeling down, depressed or hopeless Not at all   PHQ-2 Score 0       Patient-reported           4/10/2025   Substance Use   Alcohol more than 3/day or more than 7/wk No   Do you have a current opioid prescription? No   How severe/bad is pain from 1 to 10? 4/10   Do you use any other substances recreationally? (!) OTHER     Social History     Tobacco Use    Smoking status: Never    Smokeless tobacco: Never   Vaping Use    Vaping status: Never Used   Substance Use Topics    Alcohol use: Yes     Comment: less than 1 drink per week    Drug use: Never     {Provider  If there are gaps in the social history shown above, please follow the link to update and then refresh the note Link to Social and Substance History :295017}      10/24/2024   LAST FHS-7 RESULTS    1st degree relative breast or ovarian cancer No   Any relative bilateral breast cancer No   Any male have breast cancer No   Any ONE woman have BOTH breast AND ovarian cancer No   Any woman with breast cancer before 50yrs No   2 or more relatives with breast AND/OR ovarian cancer No   2 or more relatives with breast AND/OR bowel cancer No     {If any of the questions to the FHS7 are answered yes, consider referral for genetic counseling.    Additional indications for genetic referral include personal history of breast or ovarian cancer, genetic mutation in 1st degree relative which increases risk of breast cancer including BRCA1, BRCA2, PATTIE, PALB 2, TP53, CHEK2, PTEN, CDH1, STK11 (per ACS) and/or 1st degree relative with history of pancreatic or high-risk prostate cancer (per NCCN):063137}   {Mammogram Decision Support (Optional):419304}      History of abnormal Pap smear: { :664327}       ASCVD Risk   The ASCVD Risk score (Willian DESOUZA, et al., 2019) failed to calculate for the following reasons:    The valid total cholesterol range is 130 to 320 mg/dL    {Link to Fracture Risk Assessment Tool (Optional):571265}    {Provider  REQUIRED FOR AWV Use the storyboard to review patient history, after sections have been marked as reviewed, refresh note to capture documentation:454110}  {Provider   REQUIRED AWV use this link to review and update sexual activity history  after section has been marked as reviewed, refresh note to capture documentation:929867}  Reviewed and updated as needed this visit by Provider                    {HISTORY OPTIONS (Optional):384054}  Current providers sharing in care for this patient include:  Patient Care Team:  Georgina Caputo APRN CNP as PCP - General (Nurse Practitioner)  Georgina Caputo APRN CNP as Assigned PCP  Carlos Chávez MD as MD (Surgery)  Tonie Matthews OD as MD (Ophthalmology)  Geni Shukla PA-C as Physician Assistant  "(Dermatology)  Carlos Chávez MD as Assigned Surgical Provider  Bryson Moser MD as Assigned Dermatology Provider    The following health maintenance items are reviewed in Epic and correct as of today:  Health Maintenance   Topic Date Due    DTAP/TDAP/TD IMMUNIZATION (1 - Tdap) Never done    EYE EXAM  03/06/2025    COVID-19 Vaccine (7 - 2024-25 season) 04/07/2025    CMP  04/09/2025    LIPID  04/09/2025    MICROALBUMIN  04/09/2025    CBC  04/09/2025    VITAMIN B12  04/09/2025    MEDICARE ANNUAL WELLNESS VISIT  04/09/2025    TSH W/FREE T4 REFLEX  04/09/2025    VITAMIN D  04/09/2025    DIABETIC FOOT EXAM  05/09/2025    ANNUAL REVIEW OF HM ORDERS  05/09/2025    A1C  04/29/2025    FALL RISK ASSESSMENT  04/14/2026    MAMMO SCREENING  10/24/2026    ADVANCE CARE PLANNING  04/22/2029    COLORECTAL CANCER SCREENING  06/01/2032    DEXA  05/27/2036    HEPATITIS C SCREENING  Completed    PHQ-2 (once per calendar year)  Completed    INFLUENZA VACCINE  Completed    Pneumococcal Vaccine: 50+ Years  Completed    ZOSTER IMMUNIZATION  Completed    RSV VACCINE  Completed    HPV IMMUNIZATION  Aged Out    MENINGITIS IMMUNIZATION  Aged Out    BMP  Discontinued       {ROS Picklists (Optional):200081}     Objective    Exam  BP (!) 165/90 (BP Location: Right arm, Patient Position: Chair, Cuff Size: Adult Regular)   Pulse 94   Temp 97.8  F (36.6  C) (Tympanic)   Resp 12   Ht 1.614 m (5' 3.54\")   Wt 90.7 kg (200 lb)   SpO2 100%   BMI 34.83 kg/m     Estimated body mass index is 34.83 kg/m  as calculated from the following:    Height as of this encounter: 1.614 m (5' 3.54\").    Weight as of this encounter: 90.7 kg (200 lb).    Physical Exam  {Exam Choices (Optional):948398}         4/14/2025   Mini Cog   Clock Draw Score 2 Normal   3 Item Recall 3 objects recalled   Mini Cog Total Score 5     {A Mini-Cog total score of 0-2 suggests the possibility of dementia, score of 3-5 suggests no dementia:838989}         Signed " Electronically by: YESSI Crews CNP  {Email feedback regarding this note to [email or web address]   :153662}

## 2025-04-14 NOTE — TELEPHONE ENCOUNTER
Left voicemail for patient to schedule surgery with Dr. Chávez. Provided direct line for patient to call.    P: [phone]    Montserrat Willoughby on 4/14/2025 at 11:30 AM

## 2025-04-14 NOTE — TELEPHONE ENCOUNTER
Received call to schedule surgery with: Dr. Chávez    Spoke with: Janelle Dickinson     Date of Surgery: 7/24    Estimated Arrival time Discussed with Patient:  No    Location of surgery: [facility]    Pre-operative H&P: patient confirmed they will schedule with YESSI Crocker    Pre-surgical Consult: patient declined at this time due to no further questions/concerns    Additional Appointments: N/A, no additional visits requested    Post-operative Appointment w/RONNY or Surgeon: Ivette Espitia PA-C 8/4 at 1:00 PM    Discussed with patient pre-op RN will call 2-3 days prior to surgery with arrival time and instructions:  Yes     Standard Surgery Packet: Yes  will be sent 4/14  via Mail - Standard    All patients questions were answered and was instructed to contact the clinic with any questions or concerns.     Additional Comments: Explained financial securing process in detail       Tammy Briscoe on 4/14/2025 at 2:16 PM

## 2025-04-15 LAB
ALBUMIN SERPL BCG-MCNC: 4.7 G/DL (ref 3.5–5.2)
ALP SERPL-CCNC: 61 U/L (ref 40–150)
ALT SERPL W P-5'-P-CCNC: 20 U/L (ref 0–50)
ANION GAP SERPL CALCULATED.3IONS-SCNC: 15 MMOL/L (ref 7–15)
AST SERPL W P-5'-P-CCNC: 20 U/L (ref 0–45)
BILIRUB SERPL-MCNC: 0.4 MG/DL
BUN SERPL-MCNC: 10.9 MG/DL (ref 8–23)
CALCIUM SERPL-MCNC: 10 MG/DL (ref 8.8–10.4)
CHLORIDE SERPL-SCNC: 92 MMOL/L (ref 98–107)
CHOLEST SERPL-MCNC: 129 MG/DL
CREAT SERPL-MCNC: 0.69 MG/DL (ref 0.51–0.95)
CREAT UR-MCNC: 56.1 MG/DL
EGFRCR SERPLBLD CKD-EPI 2021: >90 ML/MIN/1.73M2
FASTING STATUS PATIENT QL REPORTED: NO
FASTING STATUS PATIENT QL REPORTED: NO
GLUCOSE SERPL-MCNC: 114 MG/DL (ref 70–99)
HCO3 SERPL-SCNC: 28 MMOL/L (ref 22–29)
HDLC SERPL-MCNC: 60 MG/DL
LDLC SERPL CALC-MCNC: 47 MG/DL
MICROALBUMIN UR-MCNC: <12 MG/L
MICROALBUMIN/CREAT UR: NORMAL MG/G{CREAT}
NONHDLC SERPL-MCNC: 69 MG/DL
POTASSIUM SERPL-SCNC: 3.9 MMOL/L (ref 3.4–5.3)
PROT SERPL-MCNC: 7.6 G/DL (ref 6.4–8.3)
SODIUM SERPL-SCNC: 135 MMOL/L (ref 135–145)
TRIGL SERPL-MCNC: 110 MG/DL
TSH SERPL DL<=0.005 MIU/L-ACNC: 1.77 UIU/ML (ref 0.3–4.2)
VIT B12 SERPL-MCNC: 609 PG/ML (ref 232–1245)
VIT D+METAB SERPL-MCNC: 44 NG/ML (ref 20–50)

## 2025-04-16 ENCOUNTER — OFFICE VISIT (OUTPATIENT)
Dept: OPTOMETRY | Facility: CLINIC | Age: 70
End: 2025-04-16
Payer: COMMERCIAL

## 2025-04-16 DIAGNOSIS — H52.4 PRESBYOPIA: ICD-10-CM

## 2025-04-16 DIAGNOSIS — E11.9 TYPE 2 DIABETES MELLITUS WITHOUT RETINOPATHY (H): Primary | ICD-10-CM

## 2025-04-16 DIAGNOSIS — H52.203 MYOPIA OF BOTH EYES WITH ASTIGMATISM: ICD-10-CM

## 2025-04-16 DIAGNOSIS — H52.13 MYOPIA OF BOTH EYES WITH ASTIGMATISM: ICD-10-CM

## 2025-04-16 PROCEDURE — 92015 DETERMINE REFRACTIVE STATE: CPT | Performed by: OPTOMETRIST

## 2025-04-16 PROCEDURE — 2023F DILAT RTA XM W/O RTNOPTHY: CPT | Performed by: OPTOMETRIST

## 2025-04-16 PROCEDURE — 92014 COMPRE OPH EXAM EST PT 1/>: CPT | Performed by: OPTOMETRIST

## 2025-04-16 ASSESSMENT — REFRACTION_WEARINGRX
OS_CYLINDER: +1.75
OS_ADD: +2.50
OS_SPHERE: -4.00
OD_SPHERE: -3.50
OD_AXIS: 165
SPECS_TYPE: PAL
OD_CYLINDER: +1.75
OS_AXIS: 035
OD_ADD: +2.50

## 2025-04-16 ASSESSMENT — CONF VISUAL FIELD
METHOD: COUNTING FINGERS
OS_SUPERIOR_NASAL_RESTRICTION: 0
OS_NORMAL: 1
OD_INFERIOR_TEMPORAL_RESTRICTION: 0
OD_SUPERIOR_TEMPORAL_RESTRICTION: 0
OD_SUPERIOR_NASAL_RESTRICTION: 0
OS_INFERIOR_NASAL_RESTRICTION: 0
OS_INFERIOR_TEMPORAL_RESTRICTION: 0
OD_NORMAL: 1
OD_INFERIOR_NASAL_RESTRICTION: 0
OS_SUPERIOR_TEMPORAL_RESTRICTION: 0

## 2025-04-16 ASSESSMENT — VISUAL ACUITY
CORRECTION_TYPE: GLASSES
OS_CC+: -2
OD_CC: 20/20
OS_CC: 20/20
OS_CC: 20/30
OD_CC: 20/30
METHOD: SNELLEN - LINEAR

## 2025-04-16 ASSESSMENT — TONOMETRY
OS_IOP_MMHG: 16
OD_IOP_MMHG: 16
IOP_METHOD: APPLANATION

## 2025-04-16 ASSESSMENT — REFRACTION_MANIFEST
OS_SPHERE: -4.25
OD_CYLINDER: +1.25
OS_CYLINDER: +1.50
OS_CYLINDER: +1.75
OS_SPHERE: -4.25
OD_AXIS: 165
OS_AXIS: 040
OS_AXIS: 029
OD_AXIS: 146
OD_CYLINDER: +1.25
METHOD_AUTOREFRACTION: 1
OD_SPHERE: -3.50
OD_SPHERE: -3.25

## 2025-04-16 ASSESSMENT — CUP TO DISC RATIO
OS_RATIO: 0.1
OD_RATIO: 0.1

## 2025-04-16 ASSESSMENT — EXTERNAL EXAM - LEFT EYE: OS_EXAM: ROSACEA

## 2025-04-16 ASSESSMENT — EXTERNAL EXAM - RIGHT EYE: OD_EXAM: ROSACEA

## 2025-04-16 NOTE — PROGRESS NOTES
Chief Complaint   Patient presents with    Diabetic Eye Exam       Lab Results   Component Value Date    A1C 6.4 10/29/2024    A1C 6.5 04/09/2024    A1C 6.5 11/01/2023    A1C 6.3 03/24/2023    A1C 6.3 10/21/2022    A1C 6.3 12/21/2020    A1C 6.4 06/18/2020    A1C 6.4 10/25/2019    A1C 6.6 05/02/2019            Last Eye Exam: 03/2024  Dilated Previously: Yes, side effects of dilation explained today    What are you currently using to see?  glasses    Distance Vision Acuity: Satisfied with vision    Near Vision Acuity: Satisfied with vision while reading and using computer with glasses    Eye Comfort: good  Do you use eye drops? : No      Alina Gruber - Optometric Assistant      Medical, surgical and family histories reviewed and updated 4/16/2025.     Fohx amd mother  OBJECTIVE: See Ophthalmology exam    ASSESSMENT:    ICD-10-CM    1. Type 2 diabetes mellitus without retinopathy (H)  E11.9       2. Myopia of both eyes with astigmatism  H52.13     H52.203       3. Presbyopia  H52.4         No changes  PLAN:    Janelle Bhat aware  eye exam results will be sent to Georgina Caputo.    Tonie Matthews OD

## 2025-04-16 NOTE — PATIENT INSTRUCTIONS
Patient Education   Diabetes weakens the blood vessels all over the body, including the eyes. Damage to the blood vessels in the eyes can cause swelling or bleeding into part of the eye (called the retina). This is called diabetic retinopathy (SG-tin--pu-thee). If not treated, this disease can cause vision loss or blindness.   Symptoms may include blurred or distorted vision, but many people have no symptoms. It's important to see your eye doctor regularly to check for problems.   Early treatment and good control can help protect your vision. Here are the things you can do to help prevent vision loss:      1. Keep your blood sugar levels under tight control.      2. Bring high blood pressure under control.      3. No smoking.      4. Have yearly dilated eye exams.    No retinopathy   No vision change needed

## 2025-04-16 NOTE — LETTER
4/16/2025      Janelle Bhat  [address] [facility] [address] [facility] [address]      Dear Colleague,    Thank you for referring your patient, Janelle [facility]hat[facility]. Please see a copy of my visit note below.    Chief Complaint   Patient presents with     Diabetic Eye Exam       Lab Results   Component Value Date    A1C 6.4 10/29/2024    A1C 6.5 04/09/2024    A1C 6.5 11/01/2023    A1C 6.3 03/24/2023    A1C 6.3 10/21/2022    A1C 6.3 12/21/2020    A1C 6.4 06/18/2020    A1C 6.4 10/25/2019    A1C 6.6 05/02/2019            Last Eye Exam: 03/2024  Dilated Previously: Yes, side effects of dilation explained today    What are you currently using to see?  glasses    Distance Vision Acuity: Satisfied with vision    Near Vision Acuity: Satisfied with vision while reading and using computer with glasses    Eye Comfort: good  Do you use eye drops? : No      Alina Gruber - Optometric Assistant      Medical, surgical and family histories reviewed and updated 4/16/2025.     Fohx amd mother  OBJECTIVE: See Ophthalmology exam    ASSESSMENT:    ICD-10-CM    1. Type 2 diabetes mellitus without retinopathy (H)  E11.9       2. Myopia of both eyes with astigmatism  H52.13     H52.203       3. Presbyopia  H52.4         No changes  PLAN:    Janelle Bhat aware  eye exam results will be sent to Georgina Caputo.    Tonie Matthews OD          Again, thank you for allowing me to participate in the care of your patient.        Sincerely,        Tonie Matthews, OD    Electronically signed

## 2025-04-17 ENCOUNTER — MYC MEDICAL ADVICE (OUTPATIENT)
Dept: FAMILY MEDICINE | Facility: CLINIC | Age: 70
End: 2025-04-17
Payer: COMMERCIAL

## 2025-04-17 DIAGNOSIS — E11.9 TYPE 2 DIABETES MELLITUS WITHOUT COMPLICATION, WITHOUT LONG-TERM CURRENT USE OF INSULIN (H): ICD-10-CM

## 2025-04-17 LAB
EST. AVERAGE GLUCOSE BLD GHB EST-MCNC: 146 MG/DL
HBA1C MFR BLD: 6.7 % (ref 0–5.6)

## 2025-04-17 RX ORDER — BLOOD SUGAR DIAGNOSTIC
STRIP MISCELLANEOUS
Qty: 50 STRIP | Refills: 3 | Status: SHIPPED | OUTPATIENT
Start: 2025-04-17

## 2025-04-25 NOTE — RESULT ENCOUNTER NOTE
Dear Janelle Dickinson,    Here is a summary of your recent test results:    -A1C (test of diabetes control the last 2-3 months) is at your goal. Please continue with your current plan. Also, you should make an appointment to recheck your A1C test in 6 months.   All other labs normal.    For additional lab test information, labtestsonline.org is an excellent reference.    In addition, here is a list of due or overdue Health Maintenance reminders:    Diptheria Tetanus Pertussis (DTAP/TDAP/TD) Vaccine(1 - Tdap) Never done  COVID-19 Vaccine(7 - 2024-25 season) due on 04/07/2025  ANNUAL REVIEW OF HM ORDERS due on 05/09/2025    Please call us at [phone] (or use Retrace) to address the above recommendations if needed.    Thank you for choosing [facility].  It was an honor and a privilege to participate in your care.       Healthy regards,    Georgina Caputo, ROSS  [facility]

## 2025-05-13 ENCOUNTER — ALLIED HEALTH/NURSE VISIT (OUTPATIENT)
Dept: FAMILY MEDICINE | Facility: CLINIC | Age: 70
End: 2025-05-13
Payer: COMMERCIAL

## 2025-05-13 VITALS — SYSTOLIC BLOOD PRESSURE: 129 MMHG | DIASTOLIC BLOOD PRESSURE: 75 MMHG

## 2025-05-13 DIAGNOSIS — I10 HYPERTENSION GOAL BP (BLOOD PRESSURE) < 130/80: Primary | ICD-10-CM

## 2025-05-13 PROCEDURE — 3078F DIAST BP <80 MM HG: CPT | Performed by: NURSE PRACTITIONER

## 2025-05-13 PROCEDURE — 99207 PR NO CHARGE NURSE ONLY: CPT | Performed by: NURSE PRACTITIONER

## 2025-05-13 PROCEDURE — 3074F SYST BP LT 130 MM HG: CPT | Performed by: NURSE PRACTITIONER

## 2025-05-13 NOTE — PROGRESS NOTES
Janelle Bhat was evaluated at [facility] on May 13, 2025 at which time her blood pressure was:    BP Readings from Last 1 Encounters:   05/13/25 129/75     No data recorded      Reviewed lifestyle modifications for blood pressure control and reduction: including making healthy food choices, managing weight, getting regular exercise, smoking cessation, reducing alcohol consumption, monitoring blood pressure regularly.     Symptoms: None    BP Goal:Other: 130/80    BP Assessment:  BP at goal    Potential Reasons for BP too high: NA - Not applicable    BP Follow-Up Plan: Recheck BP in 6 months at pharmacy    Recommendation to Provider: No change.  Pt has omron 3 monitor and checks it- has been at goal for the past 6 readings (dates were not recorded).    Note completed by: Georgina Mckeon RPH

## 2025-06-25 ENCOUNTER — RESULTS FOLLOW-UP (OUTPATIENT)
Dept: FAMILY MEDICINE | Facility: CLINIC | Age: 70
End: 2025-06-25

## 2025-06-25 ENCOUNTER — OFFICE VISIT (OUTPATIENT)
Dept: FAMILY MEDICINE | Facility: CLINIC | Age: 70
End: 2025-06-25
Payer: COMMERCIAL

## 2025-06-25 VITALS
HEART RATE: 80 BPM | DIASTOLIC BLOOD PRESSURE: 78 MMHG | OXYGEN SATURATION: 100 % | WEIGHT: 204 LBS | BODY MASS INDEX: 34.83 KG/M2 | RESPIRATION RATE: 15 BRPM | TEMPERATURE: 98.6 F | SYSTOLIC BLOOD PRESSURE: 128 MMHG | HEIGHT: 64 IN

## 2025-06-25 DIAGNOSIS — E11.9 TYPE 2 DIABETES MELLITUS WITHOUT COMPLICATION, WITHOUT LONG-TERM CURRENT USE OF INSULIN (H): ICD-10-CM

## 2025-06-25 DIAGNOSIS — E53.8 VITAMIN B12 DEFICIENCY (NON ANEMIC): ICD-10-CM

## 2025-06-25 DIAGNOSIS — E66.812 CLASS 2 SEVERE OBESITY WITH BODY MASS INDEX (BMI) OF 35 TO 39.9 WITH SERIOUS COMORBIDITY (H): ICD-10-CM

## 2025-06-25 DIAGNOSIS — Z01.818 PREOP GENERAL PHYSICAL EXAM: Primary | ICD-10-CM

## 2025-06-25 DIAGNOSIS — E78.5 HYPERLIPIDEMIA LDL GOAL <100: ICD-10-CM

## 2025-06-25 DIAGNOSIS — E66.01 CLASS 2 SEVERE OBESITY WITH BODY MASS INDEX (BMI) OF 35 TO 39.9 WITH SERIOUS COMORBIDITY (H): ICD-10-CM

## 2025-06-25 DIAGNOSIS — N62 MACROMASTIA: ICD-10-CM

## 2025-06-25 DIAGNOSIS — I10 HYPERTENSION GOAL BP (BLOOD PRESSURE) < 130/80: ICD-10-CM

## 2025-06-25 PROCEDURE — 93000 ELECTROCARDIOGRAM COMPLETE: CPT | Performed by: NURSE PRACTITIONER

## 2025-06-25 PROCEDURE — 3074F SYST BP LT 130 MM HG: CPT | Performed by: NURSE PRACTITIONER

## 2025-06-25 PROCEDURE — 3078F DIAST BP <80 MM HG: CPT | Performed by: NURSE PRACTITIONER

## 2025-06-25 PROCEDURE — 99214 OFFICE O/P EST MOD 30 MIN: CPT | Performed by: NURSE PRACTITIONER

## 2025-06-25 NOTE — PATIENT INSTRUCTIONS
How to Take Your Medication Before Surgery  Preoperative Medication Instructions   Antiplatelet or Anticoagulation Medication Instructions   - We reviewed the medication list and the patient is not on an antiplatelet or anticoagulation medications.    Additional Medication Instructions  Take all scheduled medications on the day of surgery EXCEPT for modifications listed below:   - Herbal medications and vitamins: DO NOT TAKE  7-14 days prior to surgery.   - ACE/ARB/ARNI (lisinopril/hctz) : DO NOT TAKE on day of surgery    - metformin: DO NOT TAKE night before surgery.       Patient Education   Preparing for Your Surgery  For Adults  Getting started  In most cases, a nurse will call to review your health history and instructions. They will give you an arrival time based on your scheduled surgery time. Please be ready to share:  Your doctor's clinic name and phone number  Your medical, surgical, and anesthesia history  A list of allergies and sensitivities  A list of medicines, including herbal treatments and over-the-counter drugs  Whether the patient has a legal guardian (ask how to send us the papers in advance)  Note: You may not receive a call if you were seen at our PAC (Preoperative Assessment Center).  Please tell us if you're pregnant--or if there's any chance you might be pregnant. Some surgeries may injure a fetus (unborn baby), so they require a pregnancy test. Surgeries that are safe for a fetus don't always need a test, and you can choose whether to have one.   Preparing for surgery  Within 10 to 30 days of surgery: Have a pre-op exam (sometimes called an H&P, or History and Physical). This can be done at a clinic or pre-operative center.  If you're having a , you may not need this exam. Talk to your care team.  At your pre-op exam, talk to your care team about all medicines you take. (This includes CBD oil and any drugs, such as THC, marijuana, and other forms of cannabis.) If you need to stop  any medicine before surgery, ask when to start taking it again.  This is for your safety. Many medicines and drugs can make you bleed too much during surgery. Some change how well surgery (anesthesia) drugs work.  Call your insurance company to let them know you're having surgery. (If you don't have insurance, call [phone].)  Call your clinic if there's any change in your health. This includes a scrape or scratch near the surgery site, or any signs of a cold (sore throat, runny nose, cough, rash, fever).  Eating and drinking guidelines  For your safety: Unless your surgeon tells you otherwise, follow the guidelines below.  Eat and drink as normal until 8 hours before you arrive for surgery. After that, no food or milk. You can spit out gum when you arrive.  Drink clear liquids until 2 hours before you arrive. These are liquids you can see through, like water, Gatorade, and Propel Water. They also include plain black coffee and tea (no cream or milk).  No alcohol for 24 hours before you arrive. The night before surgery, stop any drinks that contain THC.  If your care team tells you to take medicine on the morning of surgery, it's okay to take it with a sip of water. No other medicines or drugs are allowed (including CBD oil)--follow your care team's instructions.  If you have questions the day of surgery, call your hospital or surgery center.   Preventing infection  Shower or bathe the night before and the morning of surgery. Follow the instructions your clinic gave you. (If no instructions, use regular soap.)  Don't shave or clip hair near your surgery site. We'll remove the hair if needed.  Don't smoke or vape the morning of surgery. No chewing tobacco for 6 hours before you arrive. A nicotine patch is okay. You may spit out nicotine gum when you arrive.  For some surgeries, the surgeon will tell you to fully quit smoking and nicotine.  We will make every effort to keep you safe from infection. We will:  Clean  our hands often with soap and water (or an alcohol-based hand rub).  Clean the skin at your surgery site with a special soap that kills germs.  Give you a special gown to keep you warm. (Cold raises the risk of infection.)  Wear hair covers, masks, gowns, and gloves during surgery.  Give antibiotic medicine, if prescribed. Not all surgeries need this medicine.  What to bring on the day of surgery  Photo ID and insurance card  Copy of your health care directive, if you have one  Glasses and hearing aids (bring cases)  You can't wear contacts during surgery  Inhaler and eye drops, if you use them (tell us about these when you arrive)  CPAP machine or breathing device, if you use them  A few personal items, if spending the night  If you have . . .  A pacemaker, ICD (cardiac defibrillator), or other implant: Bring the ID card.  An implanted stimulator: Bring the remote control.  A legal guardian: Bring a copy of the certified (court-stamped) guardianship papers.  Please remove any jewelry, including body piercings. Leave jewelry and other valuables at home.  If you're going home the day of surgery  You must have a support person drive you home. They should stay with you overnight, and they may need to help with your self-care.  If you don't have a support person, please tells us as soon as possible. We can help.  After surgery  If it's hard to control your pain or you need more pain medicine, please call your surgeon's office.  Questions?   If you have any questions for your care team, list them here:   ____________________________________________________________________________________________________________________________________________________________________________________________________________________________________________________________  For informational purposes only. Not to replace the advice of your health care provider. Copyright   2003, 2019 [facility]. All rights reserved. Clinically  reviewed by Sukh Melgoza MD. [employer] [id number] - REV 02/25.

## 2025-06-25 NOTE — PROGRESS NOTES
Preoperative Evaluation  [address] [address]  Phone: [phone]  Primary Provider: YESSI Crews CNP  Pre-op Performing Provider: YESSI Crews CNP  Jun 25, 2025 6/21/2025   Surgical Information   What procedure is being done? breast reduction   Facility or Hospital where procedure/surgery will be performed: [facility]   Who is doing the procedure / surgery? Carlos Chávez   Date of surgery / procedure: 7/24/2025   Time of surgery / procedure: TBD   Where do you plan to recover after surgery? at home with family     Fax number for surgical facility: Note does not need to be faxed, will be available electronically in Epic.    Assessment & Plan     The proposed surgical procedure is considered INTERMEDIATE risk.    Preop general physical exam  Macromastia  Cleared for surgery without further workup needed or concerns.   Janelle Dickinson verbalizes understanding of plan of care and is in agreement.      - EKG 12-lead complete w/read - Clinics      Class 2 severe obesity with body mass index (BMI) of 35 to 39.9 with serious comorbidity (H)      Vitamin B12 deficiency (non anemic)      Type 2 diabetes mellitus without complication, without long-term current use of insulin (H)      Hypertension goal BP (blood pressure) < 130/80      Hyperlipidemia LDL goal <100                - No identified additional risk factors other than previously addressed    Antiplatelet or Anticoagulation Medication Instructions   - We reviewed the medication list and the patient is not on an antiplatelet or anticoagulation medications.    Additional Medication Instructions  Take all scheduled medications on the day of surgery EXCEPT for modifications listed below:   - Herbal medications and vitamins: DO NOT TAKE  7-14 days prior to surgery.   - ACE/ARB/ARNI (lisinopril/hctz) : DO NOT TAKE on day of surgery    -  metformin: DO NOT TAKE night before surgery.    Recommendation  Approval given to proceed with proposed procedure, without further diagnostic evaluation.    Return in about 4 months (around 10/25/2025) for Lab only appointment.     Subjective   Janelle Dickinson is a 69 year old, presenting for the following:  Pre-Op Exam          6/25/2025     8:43 AM   Additional Questions   Roomed by Josue SALAZAR   Accompanied by Self     HPI: macromastia         6/21/2025   Pre-Op Questionnaire   Have you ever had a heart attack or stroke? No   Have you ever had surgery on your heart or blood vessels, such as a stent placement, a coronary artery bypass, or surgery on an artery in your head, neck, heart, or legs? No   Do you have chest pain with activity? No   Do you have a history of heart failure? No   Do you currently have a cold, bronchitis or symptoms of other infection? No   Do you have a cough, shortness of breath, or wheezing? No   Do you or anyone in your family have previous history of blood clots? NO   Do you or does anyone in your family have a serious bleeding problem such as prolonged bleeding following surgeries or cuts? NO   Have you ever had problems with anemia or been told to take iron pills? No   Have you had any abnormal blood loss such as black, tarry or bloody stools, or abnormal vaginal bleeding? No   Have you ever had a blood transfusion? No   Are you willing to have a blood transfusion if it is medically needed before, during, or after your surgery? Yes   Have you or any of your relatives ever had problems with anesthesia? No   Do you have sleep apnea, excessive snoring or daytime drowsiness? No   Do you have any artifical heart valves or other implanted medical devices like a pacemaker, defibrillator, or continuous glucose monitor? No   Do you have artificial joints? No   Are you allergic to latex? No     Advance Care Planning    Document on file is a Health Care Directive or POLST.    Preoperative Review of     reviewed - no record of controlled substances prescribed.    Status of Chronic Conditions:  See problem list for active medical problems.  Problems all longstanding and stable, except as noted/documented.  See ROS for pertinent symptoms related to these conditions.    Patient Active Problem List    Diagnosis Date Noted    Macromastia 03/12/2025     Priority: Medium    Papilloma of right breast 04/11/2023     Priority: Medium    Type 2 diabetes mellitus without complication, without long-term current use of insulin (H) 04/11/2023     Priority: Medium    Hypertension goal BP (blood pressure) < 130/80 04/11/2023     Priority: Medium    Hyperlipidemia LDL goal <100 04/11/2023     Priority: Medium      Past Medical History:   Diagnosis Date    Diabetes (H)     in [place]; can't recall start    Hypertension      Past Surgical History:   Procedure Laterality Date    BIOPSY BREAST Right 7/14/2022    Procedure: Right radiofrequency tag localized breast biopsy;  Surgeon: Reginaldo Miles MD;  Location: [facility] OR    COLONOSCOPY      2-3 times    GYN SURGERY  in [place]    ovaries removed Secondary to a Benign cyst. Has her Uterus.     Current Outpatient Medications   Medication Sig Dispense Refill    amLODIPine (NORVASC) 5 MG tablet Take 1 tablet (5 mg) by mouth daily. 90 tablet 4    atorvastatin (LIPITOR) 40 MG tablet Take 1 tablet (40 mg) by mouth daily. 90 tablet 4    Calcium Carb-Cholecalciferol (CALCIUM 500+D3 PO) Take 1 capful by mouth daily      ciclopirox (LOPROX) 0.77 % cream Apply topically 2 times daily. 60 g 6    Cyanocobalamin (VITAMIN B-12 CR PO) Take by mouth.      lisinopril-hydrochlorothiazide (ZESTORETIC) 20-12.5 MG tablet Take 2 tablets by mouth daily. 180 tablet 4    metFORMIN (GLUCOPHAGE XR) 500 MG 24 hr tablet Take 4 tablets (2,000 mg) by mouth daily (with dinner). 4 tablets daily with dinner 360 tablet 4    ONETOUCH ULTRA TEST test strip USE TO TEST BLOOD SUGAR 1-3 TIMES WEEKLY OR AS DIRECTED. 50  "strip 3    thin (NO BRAND SPECIFIED) lancets Use to test blood sugar 2 times daily or as directed. To accompany: Blood Glucose Monitor Brands: per insurance.Currently one touch delica 33G lancets please 100 each 3    vitamin C (ASCORBIC ACID) 1000 MG TABS Take 1,000 mg by mouth daily      vitamin D3 (CHOLECALCIFEROL) 50 mcg (2000 units) tablet Take 1 tablet by mouth daily         Allergies   Allergen Reactions    Cats      \"Asthmatic type\" reaction.    Bactrim [Sulfamethoxazole-Trimethoprim] Rash        Social History     Tobacco Use    Smoking status: Never    Smokeless tobacco: Never   Substance Use Topics    Alcohol use: Yes     Comment: less than 1 drink per week     Family History   Problem Relation Age of Onset    Diabetes Mother         type 2    Macular Degeneration Mother     Other Cancer Father         lung cancer +    Hypertension Sister     Skin Cancer Sister     Coronary Artery Disease Maternal Grandfather         brain aneurysm    Cerebrovascular Disease Maternal Grandmother     Other Cancer Paternal Grandmother         esophagus?     History   Drug Use Unknown           Review of Systems  Constitutional, neuro, ENT, endocrine, pulmonary, cardiac, gastrointestinal, genitourinary, musculoskeletal, integument and psychiatric systems are negative, except as otherwise noted.    Objective    There were no vitals taken for this visit.   Estimated body mass index is 34.83 kg/m  as calculated from the following:    Height as of 4/14/25: 1.614 m (5' 3.54\").    Weight as of 4/14/25: 90.7 kg (200 lb).  Physical Exam  GENERAL: alert and no distress  EYES: Eyes grossly normal to inspection, PERRL and conjunctivae and sclerae normal  HENT: ear canals and TM's normal, nose and mouth without ulcers or lesions  NECK: no adenopathy, no asymmetry, masses, or scars  RESP: lungs clear to auscultation - no rales, rhonchi or wheezes  CV: regular rate and rhythm, normal S1 S2, no S3 or S4, no murmur, click or rub, no " peripheral edema  ABDOMEN: soft, nontender, no hepatosplenomegaly, no masses and bowel sounds normal  MS: no gross musculoskeletal defects noted, no edema  SKIN: no suspicious lesions or rashes  NEURO: Normal strength and tone, mentation intact and speech normal  PSYCH: mentation appears normal, affect normal/bright    Recent Labs   Lab Test 04/14/25  1244 10/29/24  1008   HGB 12.6  --      --      --    POTASSIUM 3.9  --    CR 0.69  --    A1C 6.7* 6.4*        Diagnostics  No labs were ordered during this visit.   EKG: appears normal, NSR, normal axis, normal intervals, no acute ST/T changes c/w ischemia, no LVH by voltage criteria, first-degree heart block appears new for her  Asymptomatic      Revised Cardiac Risk Index (RCRI)  The patient has the following serious cardiovascular risks for perioperative complications:   - No serious cardiac risks = 0 points     RCRI Interpretation: 0 points: Class I (very low risk - 0.4% complication rate)       Signed Electronically by: YESSI Crews CNP

## 2025-07-23 ENCOUNTER — ANESTHESIA EVENT (OUTPATIENT)
Dept: SURGERY | Facility: AMBULATORY SURGERY CENTER | Age: 70
End: 2025-07-23
Payer: COMMERCIAL

## 2025-07-23 NOTE — DISCHARGE INSTRUCTIONS
Plastic Surgery Discharge Instructions    Patient has been treated for symptomatic macromastia with Dr. Chávez on 7/24/2025.     Care: Please wear the supportive surgical bra or gently wrap with an ACE bandage on the underside of the breasts at all times. If you do not have a surgical bra, please consider ordering one online. You can shower in 36-48 hours, but allow the water to run over the incisions and do not pull on the incisions. Do not rub the incisions. No soaking. When you are done showering, gently pad dry and wear the supportive surgical bra at all times, and particularly when ambulating upright. You can loosen supportive bra when lying flat and resting in bed.     Medications: You can take Ibuprofen 400-800 mg and Tylenol 650 mg for pain relief. Please take each every 6 hours, and for optimal pain relief - please stagger the medications so that you are taking one or the other every 3 hours. If you have been prescribed additional pain medications or muscle relaxants, please take as instructed and as needed. If you are taking additional pain medications, please do not exceed 4000 mg of Tylenol daily from all sources. Also, if you are taking narcotic medications, please do not operate heavy machinery or drive. If you have been prescribed antibiotics, please also take as instructed and for the first few days after surgery.     Diet: Start with clear liquids and slow down if nauseated. Advance the diet as tolerated.    Weight-bearing: You can use your arms. No heavy lifting. No strenuous activities. For 2 weeks, do not elevate your heart rate above 100 beats per minute and no lifting greater than 3-5 pounds. After your first clinic visit, we will discuss advancing your activity level to include cardio workout and more lifting.     ER Instructions: Please call the office and/or consider return to the ER if you experience worsening pain not relieved by medications, increased swelling, redness or high fevers  >101F or if there are unexpected problems like shortness of breath.    Post-op follow-up: Clinic on 8/4/2025 with Dr. Chávez or his PA at the [facility]    Carlos Chávez MD, PhD   [facility]  Home Care Following Anesthesia  For 24 hours after surgery:  Get plenty of rest.  A responsible adult must stay with you for at least 24 hours after you leave the surgery center.  Do not drive or use heavy equipment.  If you have weakness or tingling, don't drive or use heavy equipment until this feeling goes away.   Do not drink alcohol.   Avoid strenuous or risky activities.  Ask for help when climbing stairs.  You may feel lightheaded.  IF so, sit for a few minutes before standing.  Have someone help you get up.   If you have nausea (feel sick to your stomach): Drink only clear liquids such as apple juice, ginger ale, broth or 7-Up.  Rest may also help.  Be sure to drink enough fluids.  Move to a regular diet as you feel able.   You may have a slight fever.  Call the doctor if your fever is over 100 F (37.7 C) (taken under the tongue) or lasts longer than 24 hours.  You may have a dry mouth, a sore throat, muscle aches or trouble sleeping. These should go away after 24 hours.  Do not make important or legal decisions.   It is recommended to avoid smoking.               Tips for taking pain medications  To get the best pain relief possible, remember these points:  Take pain medications as directed, before pain becomes severe.  Pain medication can upset your stomach: taking it with food may help.  Constipation is a common side effect of pain medication. Drink plenty of  fluids.  Eat foods high in fiber. Take a stool softener if recommended by your doctor or pharmacist.  Do not drink alcohol, drive or operate machinery while taking pain medications.  Ask about other ways to control pain, such as with heat, ice or relaxation.    Tylenol/Acetaminophen Consumption    If you feel  your pain relief is insufficient, you may take Tylenol/Acetaminophen in addition to your narcotic pain medication.   Be careful not to exceed 4,000 mg of Tylenol/Acetaminophen in a 24 hour period from all sources.  If you are taking extra strength Tylenol/acetaminophen (500 mg), the maximum dose is 8 tablets in 24 hours.  If you are taking regular strength acetaminophen (325 mg), the maximum dose is 12 tablets in 24 hours.    Call a doctor for any of the following:  Signs of infection (fever, growing tenderness at the surgery site, a large amount of drainage or bleeding, severe pain, foul-smelling drainage, redness, swelling).  It has been over 8 to 10 hours since surgery and you are still not able to urinate (pass water).  Headache for over 24 hours.  Numbness, tingling or weakness the day after surgery (if you had spinal anesthesia).  Signs of Covid-19 infection (temperature over 100 degrees, shortness of breath, cough, loss of taste/smell, generalized body aches, persistent headache, chills, sore throat, nausea/vomiting/diarrhea)    Your doctor is:       Dr. Carlos Chávez, Plastic Surgery: [phone]               After hours and weekends call the hospital @ [phone] and ask for the resident on call for:  Plastics  For emergency care, call the:  [place]:  [phone] (TTY for hearing impaired: [phone])

## 2025-07-24 ENCOUNTER — ANESTHESIA (OUTPATIENT)
Dept: SURGERY | Facility: AMBULATORY SURGERY CENTER | Age: 70
End: 2025-07-24
Payer: COMMERCIAL

## 2025-07-24 ENCOUNTER — HOSPITAL ENCOUNTER (OUTPATIENT)
Facility: AMBULATORY SURGERY CENTER | Age: 70
End: 2025-07-24
Attending: STUDENT IN AN ORGANIZED HEALTH CARE EDUCATION/TRAINING PROGRAM
Payer: COMMERCIAL

## 2025-07-24 VITALS
BODY MASS INDEX: 34.83 KG/M2 | OXYGEN SATURATION: 97 % | WEIGHT: 204 LBS | DIASTOLIC BLOOD PRESSURE: 84 MMHG | HEART RATE: 71 BPM | RESPIRATION RATE: 16 BRPM | TEMPERATURE: 97 F | SYSTOLIC BLOOD PRESSURE: 134 MMHG | HEIGHT: 64 IN

## 2025-07-24 DIAGNOSIS — N62 MACROMASTIA: Primary | ICD-10-CM

## 2025-07-24 LAB
GLUCOSE BLDC GLUCOMTR-MCNC: 128 MG/DL (ref 70–99)
GLUCOSE BLDC GLUCOMTR-MCNC: 155 MG/DL (ref 70–99)

## 2025-07-24 PROCEDURE — 88305 TISSUE EXAM BY PATHOLOGIST: CPT | Mod: TC | Performed by: STUDENT IN AN ORGANIZED HEALTH CARE EDUCATION/TRAINING PROGRAM

## 2025-07-24 PROCEDURE — 88305 TISSUE EXAM BY PATHOLOGIST: CPT | Mod: 26 | Performed by: PATHOLOGY

## 2025-07-24 RX ORDER — ACETAMINOPHEN 325 MG/1
975 TABLET ORAL ONCE
Status: COMPLETED | OUTPATIENT
Start: 2025-07-24 | End: 2025-07-24

## 2025-07-24 RX ORDER — FENTANYL CITRATE 50 UG/ML
INJECTION, SOLUTION INTRAMUSCULAR; INTRAVENOUS PRN
Status: DISCONTINUED | OUTPATIENT
Start: 2025-07-24 | End: 2025-07-24

## 2025-07-24 RX ORDER — HYDROMORPHONE HYDROCHLORIDE 1 MG/ML
0.4 INJECTION, SOLUTION INTRAMUSCULAR; INTRAVENOUS; SUBCUTANEOUS EVERY 5 MIN PRN
Status: ACTIVE | OUTPATIENT
Start: 2025-07-24

## 2025-07-24 RX ORDER — BUPIVACAINE HYDROCHLORIDE 2.5 MG/ML
INJECTION, SOLUTION EPIDURAL; INFILTRATION; INTRACAUDAL; PERINEURAL PRN
Status: DISCONTINUED | OUTPATIENT
Start: 2025-07-24 | End: 2025-07-24 | Stop reason: HOSPADM

## 2025-07-24 RX ORDER — DEXAMETHASONE SODIUM PHOSPHATE 4 MG/ML
INJECTION, SOLUTION INTRA-ARTICULAR; INTRALESIONAL; INTRAMUSCULAR; INTRAVENOUS; SOFT TISSUE PRN
Status: DISCONTINUED | OUTPATIENT
Start: 2025-07-24 | End: 2025-07-24

## 2025-07-24 RX ORDER — DEXAMETHASONE SODIUM PHOSPHATE 10 MG/ML
4 INJECTION, SOLUTION INTRAMUSCULAR; INTRAVENOUS
Status: ACTIVE | OUTPATIENT
Start: 2025-07-24

## 2025-07-24 RX ORDER — ACETAMINOPHEN 325 MG/1
325-650 TABLET ORAL EVERY 6 HOURS PRN
Qty: 40 TABLET | Refills: 0 | Status: SHIPPED | OUTPATIENT
Start: 2025-07-24

## 2025-07-24 RX ORDER — ONDANSETRON 4 MG/1
4 TABLET, ORALLY DISINTEGRATING ORAL EVERY 30 MIN PRN
Status: ACTIVE | OUTPATIENT
Start: 2025-07-24

## 2025-07-24 RX ORDER — CEPHALEXIN 500 MG/1
500 CAPSULE ORAL 4 TIMES DAILY
Qty: 12 CAPSULE | Refills: 0 | Status: SHIPPED | OUTPATIENT
Start: 2025-07-24

## 2025-07-24 RX ORDER — GLYCOPYRROLATE 0.2 MG/ML
INJECTION, SOLUTION INTRAMUSCULAR; INTRAVENOUS PRN
Status: DISCONTINUED | OUTPATIENT
Start: 2025-07-24 | End: 2025-07-24

## 2025-07-24 RX ORDER — OXYCODONE HYDROCHLORIDE 5 MG/1
5 TABLET ORAL EVERY 6 HOURS PRN
Qty: 12 TABLET | Refills: 0 | Status: SHIPPED | OUTPATIENT
Start: 2025-07-24

## 2025-07-24 RX ORDER — IBUPROFEN 600 MG/1
600 TABLET, FILM COATED ORAL EVERY 6 HOURS PRN
Qty: 40 TABLET | Refills: 0 | Status: SHIPPED | OUTPATIENT
Start: 2025-07-24

## 2025-07-24 RX ORDER — PROPOFOL 10 MG/ML
INJECTION, EMULSION INTRAVENOUS PRN
Status: DISCONTINUED | OUTPATIENT
Start: 2025-07-24 | End: 2025-07-24

## 2025-07-24 RX ORDER — ONDANSETRON 2 MG/ML
INJECTION INTRAMUSCULAR; INTRAVENOUS PRN
Status: DISCONTINUED | OUTPATIENT
Start: 2025-07-24 | End: 2025-07-24

## 2025-07-24 RX ORDER — OXYCODONE HYDROCHLORIDE 5 MG/1
5 TABLET ORAL
Status: COMPLETED | OUTPATIENT
Start: 2025-07-24 | End: 2025-07-24

## 2025-07-24 RX ORDER — FENTANYL CITRATE 50 UG/ML
25 INJECTION, SOLUTION INTRAMUSCULAR; INTRAVENOUS EVERY 5 MIN PRN
Status: ACTIVE | OUTPATIENT
Start: 2025-07-24

## 2025-07-24 RX ORDER — SODIUM CHLORIDE, SODIUM LACTATE, POTASSIUM CHLORIDE, CALCIUM CHLORIDE 600; 310; 30; 20 MG/100ML; MG/100ML; MG/100ML; MG/100ML
INJECTION, SOLUTION INTRAVENOUS CONTINUOUS
Status: ACTIVE | OUTPATIENT
Start: 2025-07-24

## 2025-07-24 RX ORDER — OXYCODONE HYDROCHLORIDE 5 MG/1
10 TABLET ORAL
Status: ACTIVE | OUTPATIENT
Start: 2025-07-24

## 2025-07-24 RX ORDER — FENTANYL CITRATE 50 UG/ML
50 INJECTION, SOLUTION INTRAMUSCULAR; INTRAVENOUS EVERY 5 MIN PRN
Status: ACTIVE | OUTPATIENT
Start: 2025-07-24

## 2025-07-24 RX ORDER — CEFAZOLIN SODIUM 2 G/50ML
2 SOLUTION INTRAVENOUS SEE ADMIN INSTRUCTIONS
Status: ACTIVE | OUTPATIENT
Start: 2025-07-24

## 2025-07-24 RX ORDER — LIDOCAINE 40 MG/G
CREAM TOPICAL
Status: ACTIVE | OUTPATIENT
Start: 2025-07-24

## 2025-07-24 RX ORDER — PROPOFOL 10 MG/ML
INJECTION, EMULSION INTRAVENOUS CONTINUOUS PRN
Status: DISCONTINUED | OUTPATIENT
Start: 2025-07-24 | End: 2025-07-24

## 2025-07-24 RX ORDER — HYDROMORPHONE HYDROCHLORIDE 1 MG/ML
0.2 INJECTION, SOLUTION INTRAMUSCULAR; INTRAVENOUS; SUBCUTANEOUS EVERY 5 MIN PRN
Status: ACTIVE | OUTPATIENT
Start: 2025-07-24

## 2025-07-24 RX ORDER — ASPIRIN 81 MG
100 TABLET, DELAYED RELEASE (ENTERIC COATED) ORAL DAILY
Qty: 12 TABLET | Refills: 0 | Status: SHIPPED | OUTPATIENT
Start: 2025-07-24

## 2025-07-24 RX ORDER — ONDANSETRON 2 MG/ML
4 INJECTION INTRAMUSCULAR; INTRAVENOUS EVERY 30 MIN PRN
Status: ACTIVE | OUTPATIENT
Start: 2025-07-24

## 2025-07-24 RX ORDER — CEFAZOLIN SODIUM 2 G/50ML
2 SOLUTION INTRAVENOUS
Status: COMPLETED | OUTPATIENT
Start: 2025-07-24 | End: 2025-07-24

## 2025-07-24 RX ORDER — LIDOCAINE HYDROCHLORIDE 20 MG/ML
INJECTION, SOLUTION INFILTRATION; PERINEURAL PRN
Status: DISCONTINUED | OUTPATIENT
Start: 2025-07-24 | End: 2025-07-24

## 2025-07-24 RX ORDER — ONDANSETRON 4 MG/1
4 TABLET, FILM COATED ORAL EVERY 8 HOURS PRN
Qty: 12 TABLET | Refills: 0 | Status: SHIPPED | OUTPATIENT
Start: 2025-07-24

## 2025-07-24 RX ORDER — NALOXONE HYDROCHLORIDE 0.4 MG/ML
0.1 INJECTION, SOLUTION INTRAMUSCULAR; INTRAVENOUS; SUBCUTANEOUS
Status: ACTIVE | OUTPATIENT
Start: 2025-07-24

## 2025-07-24 RX ADMIN — PROPOFOL 90 MG: 10 INJECTION, EMULSION INTRAVENOUS at 07:28

## 2025-07-24 RX ADMIN — FENTANYL CITRATE 25 MCG: 50 INJECTION, SOLUTION INTRAMUSCULAR; INTRAVENOUS at 07:25

## 2025-07-24 RX ADMIN — DEXAMETHASONE SODIUM PHOSPHATE 8 MG: 4 INJECTION, SOLUTION INTRA-ARTICULAR; INTRALESIONAL; INTRAMUSCULAR; INTRAVENOUS; SOFT TISSUE at 08:23

## 2025-07-24 RX ADMIN — FENTANYL CITRATE 25 MCG: 50 INJECTION, SOLUTION INTRAMUSCULAR; INTRAVENOUS at 07:40

## 2025-07-24 RX ADMIN — OXYCODONE HYDROCHLORIDE 5 MG: 5 TABLET ORAL at 11:19

## 2025-07-24 RX ADMIN — SODIUM CHLORIDE, SODIUM LACTATE, POTASSIUM CHLORIDE, CALCIUM CHLORIDE: 600; 310; 30; 20 INJECTION, SOLUTION INTRAVENOUS at 10:31

## 2025-07-24 RX ADMIN — Medication 200 MCG: at 09:30

## 2025-07-24 RX ADMIN — Medication 100 MCG: at 09:17

## 2025-07-24 RX ADMIN — ONDANSETRON 4 MG: 2 INJECTION INTRAMUSCULAR; INTRAVENOUS at 07:26

## 2025-07-24 RX ADMIN — GLYCOPYRROLATE 0.1 MG: 0.2 INJECTION, SOLUTION INTRAMUSCULAR; INTRAVENOUS at 07:28

## 2025-07-24 RX ADMIN — Medication 1 MG: at 08:56

## 2025-07-24 RX ADMIN — ACETAMINOPHEN 975 MG: 325 TABLET ORAL at 06:44

## 2025-07-24 RX ADMIN — FENTANYL CITRATE 25 MCG: 50 INJECTION, SOLUTION INTRAMUSCULAR; INTRAVENOUS at 08:20

## 2025-07-24 RX ADMIN — Medication 100 MCG: at 09:19

## 2025-07-24 RX ADMIN — DEXAMETHASONE SODIUM PHOSPHATE 4 MG: 4 INJECTION, SOLUTION INTRA-ARTICULAR; INTRALESIONAL; INTRAMUSCULAR; INTRAVENOUS; SOFT TISSUE at 07:58

## 2025-07-24 RX ADMIN — LIDOCAINE HYDROCHLORIDE 60 MG: 20 INJECTION, SOLUTION INFILTRATION; PERINEURAL at 07:28

## 2025-07-24 RX ADMIN — DEXAMETHASONE SODIUM PHOSPHATE 8 MG: 4 INJECTION, SOLUTION INTRA-ARTICULAR; INTRALESIONAL; INTRAMUSCULAR; INTRAVENOUS; SOFT TISSUE at 07:24

## 2025-07-24 RX ADMIN — GLYCOPYRROLATE 0.1 MG: 0.2 INJECTION, SOLUTION INTRAMUSCULAR; INTRAVENOUS at 07:24

## 2025-07-24 RX ADMIN — GLYCOPYRROLATE 0.2 MG: 0.2 INJECTION, SOLUTION INTRAMUSCULAR; INTRAVENOUS at 09:19

## 2025-07-24 RX ADMIN — SODIUM CHLORIDE, SODIUM LACTATE, POTASSIUM CHLORIDE, CALCIUM CHLORIDE: 600; 310; 30; 20 INJECTION, SOLUTION INTRAVENOUS at 06:43

## 2025-07-24 RX ADMIN — PROPOFOL 150 MCG/KG/MIN: 10 INJECTION, EMULSION INTRAVENOUS at 07:28

## 2025-07-24 RX ADMIN — ONDANSETRON 4 MG: 2 INJECTION INTRAMUSCULAR; INTRAVENOUS at 10:12

## 2025-07-24 RX ADMIN — FENTANYL CITRATE 25 MCG: 50 INJECTION, SOLUTION INTRAMUSCULAR; INTRAVENOUS at 07:10

## 2025-07-24 RX ADMIN — CEFAZOLIN SODIUM 2 G: 2 SOLUTION INTRAVENOUS at 07:23

## 2025-07-24 ASSESSMENT — ENCOUNTER SYMPTOMS: ORTHOPNEA: 0

## 2025-07-24 ASSESSMENT — COPD QUESTIONNAIRES: COPD: 0

## 2025-07-24 ASSESSMENT — LIFESTYLE VARIABLES: TOBACCO_USE: 0

## 2025-07-24 NOTE — ANESTHESIA POSTPROCEDURE EVALUATION
Ofelia from physical therapy called. She stated she spoke to the pt and they agreed pt would do better going to out patient for PT, instead of home therapy.  So she is asking for a referral for out patient PT at the [facility].    Last seen 12/20/2024  Next appt 3/20/2025       Patient: Janelle Bhat    Procedure: Procedure(s):  Bilateral breast reduction       Anesthesia Type:  General    Note:  Disposition: Outpatient   Postop Pain Control: Uneventful            Sign Out: Well controlled pain   PONV: No   Neuro/Psych: Uneventful            Sign Out: Acceptable/Baseline neuro status   Airway/Respiratory: Uneventful            Sign Out: Acceptable/Baseline resp. status   CV/Hemodynamics: Uneventful            Sign Out: Acceptable CV status; No obvious hypovolemia; No obvious fluid overload   Other NRE:    DID A NON-ROUTINE EVENT OCCUR?     Event details/Postop Comments:  Doing well. Alert, oriented. No sore throat, nausea, or problems with pain control. Patient denies any concerns.            Last vitals:  Vitals Value Taken Time   /86 07/24/25 10:55   Temp 36.4  C (97.5  F) 07/24/25 10:55   Pulse 75 07/24/25 10:55   Resp 12 07/24/25 10:55   SpO2 96 % 07/24/25 10:55       Electronically Signed By: Klarissa Matthews MD  July 24, 2025  12:59 PM

## 2025-07-24 NOTE — ANESTHESIA PROCEDURE NOTES
Airway       Patient location during procedure: OR  Staff -        CRNA: Tracy Nathan APRN CRNA       Performed By: CRNA  Consent for Airway        Urgency: elective  Indications and Patient Condition       Indications for airway management: sophie-procedural       Induction type:intravenous       Mask difficulty assessment: 1 - vent by mask    Final Airway Details       Final airway type: supraglottic airway    Supraglottic Airway Details        Type: LMA       Brand: LMA Unique       LMA size: 4    Post intubation assessment        Placement verified by: capnometry, equal breath sounds and chest rise        Number of attempts at approach: 1       Number of other approaches attempted: 0       Secured with: tape       Ease of procedure: easy       Dentition: Intact and Unchanged

## 2025-07-24 NOTE — ANESTHESIA PREPROCEDURE EVALUATION
"Anesthesia Pre-Procedure Evaluation    Patient: Janelle Bhat   MRN: [id number] : 1955          Procedure : Procedure(s):  Bilateral breast reduction         Past Medical History:   Diagnosis Date    Diabetes (H)     in [place]; can't recall start    Hypertension       Past Surgical History:   Procedure Laterality Date    BIOPSY BREAST Right 2022    Procedure: Right radiofrequency tag localized breast biopsy;  Surgeon: Reginaldo Miles MD;  Location: [facility] OR    COLONOSCOPY      2-3 times    GYN SURGERY  in [place]    ovaries removed Secondary to a Benign cyst. Has her Uterus.      Allergies   Allergen Reactions    Cats      \"Asthmatic type\" reaction.    Bactrim [Sulfamethoxazole-Trimethoprim] Rash      Social History     Tobacco Use    Smoking status: Never    Smokeless tobacco: Never   Substance Use Topics    Alcohol use: Yes     Comment: less than 1 drink per week      Wt Readings from Last 1 Encounters:   25 92.5 kg (204 lb)        Anesthesia Evaluation   Pt has had prior anesthetic. Type: General.    No history of anesthetic complications       ROS/MED HX  ENT/Pulmonary:    (-) tobacco use, asthma, COPD and recent URI   Neurologic:       Cardiovascular:     (+)  hypertension-range: 120/70s/ -   -  - -                                   (-) NIETO, orthopnea/PND and syncope   METS/Exercise Tolerance: >4 METS Comment: Goes up flight of stairs without chest pain/pressure, dyspnea, lightheadedness   Hematologic:       Musculoskeletal:       GI/Hepatic:     (+) GERD (very occasional with foods. No symptoms today),                   Renal/Genitourinary:       Endo:     (+)  type II DM, Last HgA1c: 6.7, date: , Not using insulin,          Obesity,       Psychiatric/Substance Use:       Infectious Disease:       Malignancy:       Other:              Physical Exam  Airway  Mallampati: II  TM distance: >3 FB  Neck ROM: full  Mouth opening: >= 4 cm    Cardiovascular   Rhythm: regular  Rate: " "normal rate     Dental Comments: Crowns      Pulmonary Breath sounds clear to auscultation        Neurological   She appears awake, alert and oriented x3.    Other Findings       OUTSIDE LABS:  CBC:   Lab Results   Component Value Date    WBC 7.8 04/14/2025    WBC 7.1 04/09/2024    HGB 12.6 04/14/2025    HGB 12.2 04/09/2024    HCT 37.3 04/14/2025    HCT 36.0 04/09/2024     04/14/2025     04/09/2024     BMP:   Lab Results   Component Value Date     04/14/2025     (L) 04/09/2024    POTASSIUM 3.9 04/14/2025    POTASSIUM 4.2 04/09/2024    CHLORIDE 92 (L) 04/14/2025    CHLORIDE 91 (L) 04/09/2024    CO2 28 04/14/2025    CO2 28 04/09/2024    BUN 10.9 04/14/2025    BUN 10.1 04/09/2024    CR 0.69 04/14/2025    CR 0.69 04/09/2024     (H) 07/24/2025     (H) 04/14/2025     COAGS: No results found for: \"PTT\", \"INR\", \"FIBR\"  POC: No results found for: \"BGM\", \"HCG\", \"HCGS\"  HEPATIC:   Lab Results   Component Value Date    ALBUMIN 4.7 04/14/2025    PROTTOTAL 7.6 04/14/2025    ALT 20 04/14/2025    AST 20 04/14/2025    ALKPHOS 61 04/14/2025    BILITOTAL 0.4 04/14/2025     OTHER:   Lab Results   Component Value Date    A1C 6.7 (H) 04/14/2025    AMY 10.0 04/14/2025    TSH 1.77 04/14/2025       Anesthesia Plan    ASA Status:  3      NPO Status: NPO Appropriate   Anesthesia Type: General.  Airway: supraglottic airway.  Induction: intravenous.   Techniques and Equipment:     - Airway:  Planned airway equipment includes supraglottic airway.     - Monitoring Plan: standard ASA monitoring     Consents    Anesthesia Plan(s) and associated risks, benefits, and realistic alternatives discussed. Questions answered and patient/representative(s) expressed understanding.     - Discussed: anesthesiologist     - Discussed with:  Patient, family               Postoperative Care         Comments:    Other Comments: Discussed risks of general anesthesia, including aspiration pneumonia, sore throat/hoarse voice, " "abrasions/damage to lips/tongue/teeth, nausea, rare complications (including medication reactions, cardiac, pulmonary, hypoxia/low oxygen). Ensured understanding, invited questions and all questions were answered. Patient wishes to proceed.               Klarissa Matthews MD    I have reviewed the pertinent notes and labs in the chart from the past 30 days and (re)examined the patient.  Any updates or changes from those notes are reflected in this note.    Clinically Significant Risk Factors Present on Admission                   # Hypertension: Noted on problem list          # DMII: A1C = N/A within past 6 months    # Obesity: Estimated body mass index is 35.53 kg/m  as calculated from the following:    Height as of 6/25/25: 1.614 m (5' 3.54\").    Weight as of 6/25/25: 92.5 kg (204 lb).                    "

## 2025-07-24 NOTE — PROGRESS NOTES
PRS     No changes in history or exam. Medically optimized.    OR today for bilateral breast reduction.    Discussed risks of surgery, including but not limited to: infection, bleeding, pain, poor scarring, loss of nipple areola, loss of nipple areolar sensation, asymmetries, suboptimal aesthetic result, hematoma, seroma, wound healing issues, DVT/PE, cardiac event, death. Despite these risks, patient consents to BILATERAL breast reduction. All questions answered.    Carlos Chávez MD, PhD, FACS

## 2025-07-24 NOTE — ANESTHESIA CARE TRANSFER NOTE
Patient: Janelle Bhat    Procedure: Procedure(s):  Bilateral breast reduction       Diagnosis: Macromastia [N62]  Diagnosis Additional Information: No value filed.    Anesthesia Type:   General     Note:    Oropharynx: oropharynx clear of all foreign objects and spontaneously breathing  Level of Consciousness: drowsy  Oxygen Supplementation: nasal cannula  Level of Supplemental Oxygen (L/min / FiO2): 2  Independent Airway: airway patency satisfactory and stable  Dentition: dentition unchanged  Vital Signs Stable: post-procedure vital signs reviewed and stable  Report to RN Given: handoff report given  Patient transferred to: PACU    Handoff Report: Identifed the Patient, Identified the Reponsible Provider, Reviewed the pertinent medical history, Discussed the surgical course, Reviewed Intra-OP anesthesia mangement and issues during anesthesia, Set expectations for post-procedure period and Allowed opportunity for questions and acknowledgement of understanding  Vitals:  Vitals Value Taken Time   BP     Temp 35.9  C (96.62  F) 07/24/25 10:39   Pulse 80 07/24/25 10:40   Resp 7 07/24/25 10:40   SpO2 95 % 07/24/25 10:40   Vitals shown include unfiled device data.    Electronically Signed By: YESSI Funk CRNA  July 24, 2025  10:40 AM

## 2025-07-24 NOTE — BRIEF OP NOTE
[facility]    Brief Operative Note    Pre-operative diagnosis: Macromastia [N62]  Post-operative diagnosis Same as pre-operative diagnosis    Procedure: Bilateral breast reduction, Bilateral - Breast    Surgeon: Surgeons and Role:     * Carlos Chávez MD - Primary     * Myra Rick PA-C - Assisting     * Constance Boateng MD - Resident - Assisting  Anesthesia: General   Estimated Blood Loss: 50mL    Drains: None  Specimens:   ID Type Source Tests Collected by Time Destination   1 : Right Breast Tissue Tissue Breast, Right SURGICAL PATHOLOGY EXAM Carlos Chávez MD 7/24/2025  8:45 AM    2 : Left Breast Tissue Tissue Breast, Left SURGICAL PATHOLOGY EXAM Carlos Chávez MD 7/24/2025  9:05 AM      Findings:   None.  Complications: None.  Implants: * No implants in log *    Follow up in clinic in two weeks

## 2025-07-24 NOTE — OP NOTE
PLASTIC SURGERY OPERATIVE REPORT     Date of Surgery: 7/24/2025  Surgical Service: Plastic Surgery     Preoperative Diagnosis: Bilateral symptomatic macromastia     Postoperative Diagnosis: Same as preoperative diagnoses     Procedures Performed: Bilateral reduction mammaplasty     Attending:  RAINE Chávez MD, PhD, FACS  Assistant: RAINE Boateng MD; SOHAM Rick PA-C     Complications: None apparent  Specimens: L breast tissue (1000 grams), R breast tissue (1100 grams)  Implants: None  Estimated blood loss: 50 mL  Wound classification: Clean  Anesthesia: General     Indications for Procedure: 69 year-old female presenting with symptomatic bilateral macromastia refractory to conservative management seeking breast reduction.     Discussed the risks of surgery, including but not limited to: infection, bleeding, hematoma, seroma, poor scarring, wound healing issues, pain, nipple sensitivity issues or decreased sensation, loss of nipple areola, need for free nipple grafting, asymmetry, need for revision surgery, suboptimal aesthetic result, DVT, PE, death. Despite these risks, patient consents to and would like to proceed with bilateral breast reduction. All questions answered.      Intraoperative findings: A bilateral inverted T inferior pedicle reduction was planned and done with reasonable symmetry. Both nipple-areola well-perfused following inset.      Description of Procedure: Patient was seen in the preoperative holding area. Consent was verified.  The bilateral breasts were marked.  All additional questions were answered.  The risks of the surgery were reiterated, including (but not limited to): infection, bleeding, pain, poor scarring, hematoma requiring OR evacuation, wound healing issues particularly at the T-point, loss of nipple sensation, loss of nipple pigmentation, loss of nipple entirely or need for free nipple grafting in the operating room, asymmetries, need for revision surgery. Following discussion of all  these risks, the patient again agreed to proceed with surgery.      Patient was then transferred to the operating room and placed supine on the operating table.  All pressure points were padded.  Sequential compression devices were placed on bilateral lower extremities and verified to be operational.  IV antibiotic prophylaxis was given.  Preinduction timeout was performed.  General anesthesia was induced. The breasts were prepped and draped in usual sterile fashion.      The same procedure was done on both sides, starting with the right breast. First, the 42-mm cookie cutter was used to nicole the new areola, and the inferior pedicle was designed with an 8 cm base. Next, a preoperative markings, the perimeter of the inferior pedicle and the new areolar circumference was scored with a #10 blade. The resection markings were then made through dermis. The skin was de-epithelialized from the inferior pedicle. Next, the medial and lateral superior skin flaps were raised at least 2-cm thick and even, taken down to the chest wall. Then, the resection was started medially using monopolar electrocautery, leaving some breast tissue over the chest wall to improve medial breast contour. The resection was then carried over lateral around the inferior pedicle, with care taken to not undermine the pedicle or to incise the pectoralis major fascia. Next, the resection was then taken laterally and the resection was completed. Next, irrigation was used and copious hemostasis was done with monopolar electrocautery. The pedicle was then loosely secured centered to the chest wall with 2-0 Vicryl suture. Next, the lateral aspect of the incision was secured to the chest wall with 3-pointed 2-0 Vicryl suture involving the superficial fascial system. Next, the horizontal and vertical limbs were closed with 2-0 Vicryl suture in the SFS, 3-0 Monocryl deep dermal suture, and 4-0 Monocryl running intracuticular suture. The top of the vertical  incision was not completely closed in lieu of areolar placement once both sides were reduced. The left breast reduction was then done using the same technique as on the right. Once done, the symmetry in volume was checked. Once both breast horizontal and vertical limbs were closed, the patient was sat upright and the new nipple-areola were marked using a 38-mm cookie cutter, centered on the breast mounds and the nipple-midline and sternal notch-new nipple measurements were confirmed to be symmetric. The patient was then laid back supine and the additional new areolar skin resections were done with a #15 blade. Hemostasis was again obtained. The nipple-areola were inset using 4-0 Monocryl deep dermal and running intracuticular suture. All incisions were then dressed with skin adhesive and steri-strips. The breasts were dressed with fluff gauze and a surgical bra was placed. Patient was then transferred to the post-anesthesia care unit      Postoperative plan: Clinic in 2 weeks. No strenuous activities for 2 weeks, including no heart rate elevation above 100 bpm.      Carlos Chávez MD, PhD, FACS

## 2025-07-27 ENCOUNTER — TELEPHONE (OUTPATIENT)
Dept: SURGERY | Facility: CLINIC | Age: 70
End: 2025-07-27

## 2025-08-04 ENCOUNTER — OFFICE VISIT (OUTPATIENT)
Dept: PLASTIC SURGERY | Facility: CLINIC | Age: 70
End: 2025-08-04
Payer: COMMERCIAL

## 2025-08-04 VITALS
SYSTOLIC BLOOD PRESSURE: 153 MMHG | HEIGHT: 64 IN | BODY MASS INDEX: 33.89 KG/M2 | HEART RATE: 64 BPM | OXYGEN SATURATION: 99 % | WEIGHT: 198.5 LBS | DIASTOLIC BLOOD PRESSURE: 77 MMHG

## 2025-08-04 DIAGNOSIS — N62 MACROMASTIA: Primary | ICD-10-CM

## 2025-08-04 ASSESSMENT — PAIN SCALES - GENERAL: PAINLEVEL_OUTOF10: NO PAIN (0)

## 2025-08-16 ENCOUNTER — TELEPHONE (OUTPATIENT)
Dept: SURGERY | Facility: CLINIC | Age: 70
End: 2025-08-16

## 2025-08-16 ENCOUNTER — MYC MEDICAL ADVICE (OUTPATIENT)
Dept: PLASTIC SURGERY | Facility: CLINIC | Age: 70
End: 2025-08-16

## 2025-08-16 ENCOUNTER — APPOINTMENT (OUTPATIENT)
Dept: CT IMAGING | Facility: CLINIC | Age: 70
End: 2025-08-16
Attending: EMERGENCY MEDICINE
Payer: COMMERCIAL

## 2025-08-16 ENCOUNTER — NURSE TRIAGE (OUTPATIENT)
Dept: NURSING | Facility: CLINIC | Age: 70
End: 2025-08-16
Payer: COMMERCIAL

## 2025-08-16 ENCOUNTER — HOSPITAL ENCOUNTER (INPATIENT)
Facility: CLINIC | Age: 70
LOS: 4 days | Discharge: HOME | End: 2025-08-20
Attending: EMERGENCY MEDICINE | Admitting: PLASTIC SURGERY
Payer: COMMERCIAL

## 2025-08-16 PROBLEM — T81.40XA POSTOPERATIVE INFECTION, UNSPECIFIED TYPE, INITIAL ENCOUNTER: Status: ACTIVE | Noted: 2025-08-16

## 2025-08-16 ASSESSMENT — ACTIVITIES OF DAILY LIVING (ADL)
ADLS_ACUITY_SCORE: 41

## 2025-08-16 ASSESSMENT — COLUMBIA-SUICIDE SEVERITY RATING SCALE - C-SSRS
1. IN THE PAST MONTH, HAVE YOU WISHED YOU WERE DEAD OR WISHED YOU COULD GO TO SLEEP AND NOT WAKE UP?: NO
6. HAVE YOU EVER DONE ANYTHING, STARTED TO DO ANYTHING, OR PREPARED TO DO ANYTHING TO END YOUR LIFE?: NO
2. HAVE YOU ACTUALLY HAD ANY THOUGHTS OF KILLING YOURSELF IN THE PAST MONTH?: NO

## 2025-08-17 PROBLEM — T81.49XA SURGICAL SITE INFECTION: Status: ACTIVE | Noted: 2025-08-17

## 2025-08-17 ASSESSMENT — ACTIVITIES OF DAILY LIVING (ADL)
ADLS_ACUITY_SCORE: 46
ADLS_ACUITY_SCORE: 36
ADLS_ACUITY_SCORE: 46
ADLS_ACUITY_SCORE: 36
ADLS_ACUITY_SCORE: 46
ADLS_ACUITY_SCORE: 38
ADLS_ACUITY_SCORE: 46
ADLS_ACUITY_SCORE: 38
ADLS_ACUITY_SCORE: 36
ADLS_ACUITY_SCORE: 46
ADLS_ACUITY_SCORE: 36
ADLS_ACUITY_SCORE: 46
ADLS_ACUITY_SCORE: 46
ADLS_ACUITY_SCORE: 38
ADLS_ACUITY_SCORE: 41
ADLS_ACUITY_SCORE: 36
ADLS_ACUITY_SCORE: 38
ADLS_ACUITY_SCORE: 36
ADLS_ACUITY_SCORE: 46
ADLS_ACUITY_SCORE: 46
ADLS_ACUITY_SCORE: 41

## 2025-08-18 ASSESSMENT — ACTIVITIES OF DAILY LIVING (ADL)
ADLS_ACUITY_SCORE: 38

## 2025-08-19 ASSESSMENT — ACTIVITIES OF DAILY LIVING (ADL)
ADLS_ACUITY_SCORE: 43
ADLS_ACUITY_SCORE: 40
ADLS_ACUITY_SCORE: 40
ADLS_ACUITY_SCORE: 43
ADLS_ACUITY_SCORE: 40
ADLS_ACUITY_SCORE: 38
ADLS_ACUITY_SCORE: 40
ADLS_ACUITY_SCORE: 43
ADLS_ACUITY_SCORE: 40

## 2025-08-20 ASSESSMENT — ACTIVITIES OF DAILY LIVING (ADL)
ADLS_ACUITY_SCORE: 40

## 2025-08-27 ENCOUNTER — OFFICE VISIT (OUTPATIENT)
Dept: PLASTIC SURGERY | Facility: CLINIC | Age: 70
End: 2025-08-27
Payer: COMMERCIAL

## 2025-08-27 ENCOUNTER — MYC MEDICAL ADVICE (OUTPATIENT)
Dept: FAMILY MEDICINE | Facility: CLINIC | Age: 70
End: 2025-08-27

## 2025-08-27 VITALS
WEIGHT: 195.1 LBS | HEART RATE: 79 BPM | DIASTOLIC BLOOD PRESSURE: 90 MMHG | SYSTOLIC BLOOD PRESSURE: 169 MMHG | OXYGEN SATURATION: 97 % | BODY MASS INDEX: 33.98 KG/M2 | TEMPERATURE: 98.1 F

## 2025-08-27 DIAGNOSIS — N62 MACROMASTIA: Primary | ICD-10-CM

## 2025-08-27 ASSESSMENT — PAIN SCALES - GENERAL: PAINLEVEL_OUTOF10: NO PAIN (0)

## 2025-09-02 ENCOUNTER — MYC MEDICAL ADVICE (OUTPATIENT)
Dept: PLASTIC SURGERY | Facility: CLINIC | Age: 70
End: 2025-09-02
Payer: COMMERCIAL

## 2025-09-04 DIAGNOSIS — Z98.890 STATUS POST BREAST REDUCTION: Primary | ICD-10-CM

## (undated) DEVICE — GLOVE PROTEXIS POWDER FREE SMT 7.5  2D72PT75X

## (undated) DEVICE — PACK MINOR CUSTOM RIDGES SBA32RMRMA

## (undated) DEVICE — SET BREAST BIOPSY LOCALIZER 20 PROBE 8MM PENCIL 09-0006

## (undated) DEVICE — SU VICRYL 3-0 SH 27" UND J416H

## (undated) DEVICE — ESU GROUND PAD ADULT W/CORD E7507

## (undated) DEVICE — SOL NACL 0.9% IRRIG 1000ML BOTTLE 2F7124

## (undated) DEVICE — ADH SKIN CLOSURE PREMIERPRO EXOFIN MICOR HV 0.5ML 3471

## (undated) DEVICE — SUCTION CANISTER MEDIVAC LINER 3000ML W/LID 65651-530

## (undated) DEVICE — DRAPE LAP PEDS DISP 29492

## (undated) DEVICE — PREP CHLORAPREP 26ML TINTED HI-LITE ORANGE 930815

## (undated) DEVICE — LINEN FULL SHEET 5511

## (undated) DEVICE — GLOVE PROTEXIS POWDER FREE 8.0 ORTHOPEDIC 2D73ET80

## (undated) DEVICE — BAG CLEAR TRASH 1.3M 39X33" P4040C

## (undated) DEVICE — SU SILK 2-0 PSL 18" 673H

## (undated) DEVICE — LOCALIZER INSTRUMENT COVER KIT

## (undated) DEVICE — LINEN HALF SHEET 5512

## (undated) DEVICE — PAD CHUX UNDERPAD 30X36" P3036C

## (undated) DEVICE — TUBING SUCTION 12"X1/4" N612

## (undated) DEVICE — LINEN TOWEL PACK X10 5473

## (undated) RX ORDER — HYDROMORPHONE HYDROCHLORIDE 1 MG/ML
INJECTION, SOLUTION INTRAMUSCULAR; INTRAVENOUS; SUBCUTANEOUS
Status: DISPENSED
Start: 2025-07-24

## (undated) RX ORDER — BUPIVACAINE HYDROCHLORIDE 5 MG/ML
INJECTION, SOLUTION EPIDURAL; INTRACAUDAL
Status: DISPENSED
Start: 2022-07-14

## (undated) RX ORDER — FENTANYL CITRATE-0.9 % NACL/PF 10 MCG/ML
PLASTIC BAG, INJECTION (ML) INTRAVENOUS
Status: DISPENSED
Start: 2022-07-14

## (undated) RX ORDER — PROPOFOL 10 MG/ML
INJECTION, EMULSION INTRAVENOUS
Status: DISPENSED
Start: 2025-07-24

## (undated) RX ORDER — DEXAMETHASONE SODIUM PHOSPHATE 4 MG/ML
INJECTION, SOLUTION INTRA-ARTICULAR; INTRALESIONAL; INTRAMUSCULAR; INTRAVENOUS; SOFT TISSUE
Status: DISPENSED
Start: 2022-07-14

## (undated) RX ORDER — FENTANYL CITRATE-0.9 % NACL/PF 10 MCG/ML
PLASTIC BAG, INJECTION (ML) INTRAVENOUS
Status: DISPENSED
Start: 2025-07-24

## (undated) RX ORDER — ONDANSETRON 2 MG/ML
INJECTION INTRAMUSCULAR; INTRAVENOUS
Status: DISPENSED
Start: 2025-07-24

## (undated) RX ORDER — CEFAZOLIN SODIUM/WATER 2 G/20 ML
SYRINGE (ML) INTRAVENOUS
Status: DISPENSED
Start: 2022-07-14

## (undated) RX ORDER — FENTANYL CITRATE 50 UG/ML
INJECTION, SOLUTION INTRAMUSCULAR; INTRAVENOUS
Status: DISPENSED
Start: 2022-07-14

## (undated) RX ORDER — LIDOCAINE HYDROCHLORIDE 10 MG/ML
INJECTION, SOLUTION EPIDURAL; INFILTRATION; INTRACAUDAL; PERINEURAL
Status: DISPENSED
Start: 2022-07-14

## (undated) RX ORDER — LIDOCAINE HYDROCHLORIDE AND EPINEPHRINE 10; 10 MG/ML; UG/ML
INJECTION, SOLUTION INFILTRATION; PERINEURAL
Status: DISPENSED
Start: 2025-07-24

## (undated) RX ORDER — FENTANYL CITRATE 50 UG/ML
INJECTION, SOLUTION INTRAMUSCULAR; INTRAVENOUS
Status: DISPENSED
Start: 2025-07-24

## (undated) RX ORDER — GLYCOPYRROLATE 0.2 MG/ML
INJECTION, SOLUTION INTRAMUSCULAR; INTRAVENOUS
Status: DISPENSED
Start: 2025-07-24

## (undated) RX ORDER — BUPIVACAINE HYDROCHLORIDE 2.5 MG/ML
INJECTION, SOLUTION EPIDURAL; INFILTRATION; INTRACAUDAL; PERINEURAL
Status: DISPENSED
Start: 2025-07-24

## (undated) RX ORDER — DEXAMETHASONE SODIUM PHOSPHATE 4 MG/ML
INJECTION, SOLUTION INTRA-ARTICULAR; INTRALESIONAL; INTRAMUSCULAR; INTRAVENOUS; SOFT TISSUE
Status: DISPENSED
Start: 2025-07-24

## (undated) RX ORDER — PROPOFOL 10 MG/ML
INJECTION, EMULSION INTRAVENOUS
Status: DISPENSED
Start: 2022-07-14

## (undated) RX ORDER — EPHEDRINE SULFATE 50 MG/ML
INJECTION, SOLUTION INTRAMUSCULAR; INTRAVENOUS; SUBCUTANEOUS
Status: DISPENSED
Start: 2022-07-14

## (undated) RX ORDER — BUPIVACAINE HYDROCHLORIDE 5 MG/ML
INJECTION, SOLUTION EPIDURAL; INTRACAUDAL; PERINEURAL
Status: DISPENSED
Start: 2025-07-24

## (undated) RX ORDER — OXYCODONE HYDROCHLORIDE 5 MG/1
TABLET ORAL
Status: DISPENSED
Start: 2025-07-24

## (undated) RX ORDER — DEXMEDETOMIDINE HYDROCHLORIDE 4 UG/ML
INJECTION, SOLUTION INTRAVENOUS
Status: DISPENSED
Start: 2025-07-24

## (undated) RX ORDER — CEFAZOLIN SODIUM 2 G/50ML
SOLUTION INTRAVENOUS
Status: DISPENSED
Start: 2025-07-24

## (undated) RX ORDER — ONDANSETRON 2 MG/ML
INJECTION INTRAMUSCULAR; INTRAVENOUS
Status: DISPENSED
Start: 2022-07-14

## (undated) RX ORDER — ACETAMINOPHEN 325 MG/1
TABLET ORAL
Status: DISPENSED
Start: 2025-07-24